# Patient Record
Sex: MALE | Race: BLACK OR AFRICAN AMERICAN | NOT HISPANIC OR LATINO | Employment: FULL TIME | URBAN - METROPOLITAN AREA
[De-identification: names, ages, dates, MRNs, and addresses within clinical notes are randomized per-mention and may not be internally consistent; named-entity substitution may affect disease eponyms.]

---

## 2018-04-02 ENCOUNTER — HOSPITAL ENCOUNTER (EMERGENCY)
Facility: HOSPITAL | Age: 43
Discharge: HOME | End: 2018-04-02
Payer: COMMERCIAL

## 2018-04-02 VITALS
SYSTOLIC BLOOD PRESSURE: 136 MMHG | HEIGHT: 69 IN | DIASTOLIC BLOOD PRESSURE: 85 MMHG | HEART RATE: 104 BPM | BODY MASS INDEX: 46.65 KG/M2 | RESPIRATION RATE: 16 BRPM | TEMPERATURE: 98 F | WEIGHT: 315 LBS
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
## 2018-04-02 DIAGNOSIS — M54.50 ACUTE RIGHT-SIDED LOW BACK PAIN WITHOUT SCIATICA: Primary | ICD-10-CM

## 2018-04-02 PROCEDURE — 99283 EMERGENCY DEPT VISIT LOW MDM: CPT

## 2018-04-02 RX ORDER — DICLOFENAC SODIUM 75 MG/1
75 TABLET, DELAYED RELEASE ORAL 2 TIMES DAILY
Qty: 14 TABLET | Refills: 0 | Status: SHIPPED | OUTPATIENT
Start: 2018-04-02 | End: 2018-04-09

## 2018-04-02 RX ORDER — ORPHENADRINE CITRATE 100 MG/1
100 TABLET, EXTENDED RELEASE ORAL 2 TIMES DAILY
Qty: 20 TABLET | Refills: 0 | Status: SHIPPED | OUTPATIENT
Start: 2018-04-02 | End: 2018-04-12
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
## 2018-11-30 PROBLEM — R63.5 ABNORMAL WEIGHT GAIN: Status: ACTIVE | Noted: 2018-11-30

## 2018-11-30 PROBLEM — R94.31 NONSPECIFIC ABNORMAL ELECTROCARDIOGRAM (ECG) (EKG): Status: ACTIVE | Noted: 2018-11-30

## 2018-11-30 PROBLEM — E34.9 TESTOSTERONE INSUFFICIENCY: Status: ACTIVE | Noted: 2018-11-30

## 2018-11-30 PROBLEM — K90.9 INTESTINAL MALABSORPTION: Status: ACTIVE | Noted: 2018-11-30

## 2018-12-13 PROBLEM — E88.819 INSULIN RESISTANCE: Status: ACTIVE | Noted: 2018-12-13

## 2018-12-13 PROBLEM — E78.2 MIXED HYPERLIPIDEMIA: Status: ACTIVE | Noted: 2018-12-13

## 2019-03-15 ENCOUNTER — OFFICE VISIT (OUTPATIENT)
Dept: INTERNAL MEDICINE | Facility: CLINIC | Age: 44
End: 2019-03-15
Payer: COMMERCIAL

## 2019-03-15 ENCOUNTER — LAB VISIT (OUTPATIENT)
Dept: LAB | Facility: HOSPITAL | Age: 44
End: 2019-03-15
Attending: FAMILY MEDICINE
Payer: COMMERCIAL

## 2019-03-15 VITALS
RESPIRATION RATE: 18 BRPM | DIASTOLIC BLOOD PRESSURE: 82 MMHG | SYSTOLIC BLOOD PRESSURE: 138 MMHG | HEIGHT: 69 IN | OXYGEN SATURATION: 99 % | HEART RATE: 109 BPM | WEIGHT: 315 LBS | BODY MASS INDEX: 46.65 KG/M2 | TEMPERATURE: 97 F

## 2019-03-15 DIAGNOSIS — R22.33 ELBOW MASS, BILATERAL: ICD-10-CM

## 2019-03-15 DIAGNOSIS — E78.00 ELEVATED CHOLESTEROL: ICD-10-CM

## 2019-03-15 DIAGNOSIS — R73.03 PREDIABETES: ICD-10-CM

## 2019-03-15 DIAGNOSIS — M72.2 PLANTAR FASCIITIS, BILATERAL: Primary | ICD-10-CM

## 2019-03-15 DIAGNOSIS — R79.89 LOW TESTOSTERONE: ICD-10-CM

## 2019-03-15 DIAGNOSIS — R09.82 POSTNASAL DRIP: ICD-10-CM

## 2019-03-15 DIAGNOSIS — Z12.5 PROSTATE CANCER SCREENING: ICD-10-CM

## 2019-03-15 DIAGNOSIS — K21.9 GASTROESOPHAGEAL REFLUX DISEASE WITHOUT ESOPHAGITIS: ICD-10-CM

## 2019-03-15 LAB
ALBUMIN SERPL BCP-MCNC: 3.4 G/DL
ALP SERPL-CCNC: 102 U/L
ALT SERPL W/O P-5'-P-CCNC: 21 U/L
ANION GAP SERPL CALC-SCNC: 9 MMOL/L
AST SERPL-CCNC: 18 U/L
BILIRUB SERPL-MCNC: 0.5 MG/DL
BUN SERPL-MCNC: 15 MG/DL
CALCIUM SERPL-MCNC: 9.8 MG/DL
CHLORIDE SERPL-SCNC: 101 MMOL/L
CHOLEST SERPL-MCNC: 224 MG/DL
CHOLEST/HDLC SERPL: 4.3 {RATIO}
CO2 SERPL-SCNC: 29 MMOL/L
COMPLEXED PSA SERPL-MCNC: 0.36 NG/ML
CREAT SERPL-MCNC: 1 MG/DL
EST. GFR  (AFRICAN AMERICAN): >60 ML/MIN/1.73 M^2
EST. GFR  (NON AFRICAN AMERICAN): >60 ML/MIN/1.73 M^2
ESTIMATED AVG GLUCOSE: 128 MG/DL
GLUCOSE SERPL-MCNC: 81 MG/DL
HBA1C MFR BLD HPLC: 6.1 %
HDLC SERPL-MCNC: 52 MG/DL
HDLC SERPL: 23.2 %
LDLC SERPL CALC-MCNC: 144.4 MG/DL
NONHDLC SERPL-MCNC: 172 MG/DL
POTASSIUM SERPL-SCNC: 4.2 MMOL/L
PROT SERPL-MCNC: 8.5 G/DL
SODIUM SERPL-SCNC: 139 MMOL/L
TRIGL SERPL-MCNC: 138 MG/DL
TSH SERPL DL<=0.005 MIU/L-ACNC: 0.72 UIU/ML

## 2019-03-15 PROCEDURE — 80053 COMPREHEN METABOLIC PANEL: CPT

## 2019-03-15 PROCEDURE — 82040 ASSAY OF SERUM ALBUMIN: CPT

## 2019-03-15 PROCEDURE — 36415 COLL VENOUS BLD VENIPUNCTURE: CPT

## 2019-03-15 PROCEDURE — 99202 PR OFFICE/OUTPT VISIT, NEW, LEVL II, 15-29 MIN: ICD-10-PCS | Mod: S$GLB,,, | Performed by: FAMILY MEDICINE

## 2019-03-15 PROCEDURE — 83036 HEMOGLOBIN GLYCOSYLATED A1C: CPT

## 2019-03-15 PROCEDURE — 80061 LIPID PANEL: CPT

## 2019-03-15 PROCEDURE — 84153 ASSAY OF PSA TOTAL: CPT

## 2019-03-15 PROCEDURE — 99999 PR PBB SHADOW E&M-EST. PATIENT-LVL III: CPT | Mod: PBBFAC,,, | Performed by: FAMILY MEDICINE

## 2019-03-15 PROCEDURE — 99202 OFFICE O/P NEW SF 15 MIN: CPT | Mod: S$GLB,,, | Performed by: FAMILY MEDICINE

## 2019-03-15 PROCEDURE — 99999 PR PBB SHADOW E&M-EST. PATIENT-LVL III: ICD-10-PCS | Mod: PBBFAC,,, | Performed by: FAMILY MEDICINE

## 2019-03-15 PROCEDURE — 84443 ASSAY THYROID STIM HORMONE: CPT

## 2019-03-15 RX ORDER — FLUTICASONE PROPIONATE 50 MCG
1 SPRAY, SUSPENSION (ML) NASAL DAILY
Qty: 1 BOTTLE | Refills: 0 | Status: SHIPPED | OUTPATIENT
Start: 2019-03-15 | End: 2019-05-01

## 2019-03-15 RX ORDER — PANTOPRAZOLE SODIUM 40 MG/1
40 TABLET, DELAYED RELEASE ORAL DAILY
Qty: 90 TABLET | Refills: 1 | Status: SHIPPED | OUTPATIENT
Start: 2019-03-15 | End: 2023-02-20

## 2019-03-15 RX ORDER — IBUPROFEN 800 MG/1
800 TABLET ORAL 3 TIMES DAILY
Qty: 60 TABLET | Refills: 1 | Status: SHIPPED | OUTPATIENT
Start: 2019-03-15 | End: 2019-05-01 | Stop reason: SDUPTHER

## 2019-03-15 NOTE — PROGRESS NOTES
Subjective:       Patient ID: Dylan Ariza is a 43 y.o. male.    Chief Complaint: Foot Pain    HPI Mr. Ariza presents today with multiple complaints.     Heel pain  Left worse than the right.   2 months   When off of them gets better but standing he feels it again.     Not taking anything.     Swelling of the elbow. Leaning on them hurts  1 month     Cough a couple years  2-3 times a week   Feels like something is in his throat  Acid reflux symptoms at times.     Review of Systems   Constitutional: Negative.    HENT: Positive for sore throat.    Respiratory: Positive for cough.    Genitourinary: Negative.    Musculoskeletal: Negative.    Neurological: Positive for headaches.   Psychiatric/Behavioral: Negative.         Past Medical History:   Diagnosis Date    Mixed hyperlipidemia 12/13/2018     Past Surgical History:   Procedure Laterality Date    SLEEVE GASTROPLASTY       Family History   Problem Relation Age of Onset    Heart disease Father     Hypertension Father     Cancer Paternal Grandmother     Diabetes Neg Hx      Social History     Socioeconomic History    Marital status: Single     Spouse name: None    Number of children: None    Years of education: None    Highest education level: None   Social Needs    Financial resource strain: None    Food insecurity - worry: None    Food insecurity - inability: None    Transportation needs - medical: None    Transportation needs - non-medical: None   Occupational History    None   Tobacco Use    Smoking status: Current Every Day Smoker    Smokeless tobacco: Never Used   Substance and Sexual Activity    Alcohol use: Yes    Drug use: No    Sexual activity: Yes   Other Topics Concern    None   Social History Narrative    None       Objective:        Physical Exam   Constitutional: He appears well-developed and well-nourished.   HENT:   Head: Normocephalic and atraumatic.   Right Ear: External ear normal.   Left Ear: External ear normal.    Pulmonary/Chest: Effort normal and breath sounds normal. No respiratory distress.   Musculoskeletal: Normal range of motion.   Both elbows have large soft mass cyst vs lipoma  nontender to palpation   Vitals reviewed.        Results for orders placed or performed in visit on 11/30/18   Hemoglobin A1c   Result Value Ref Range    A1c 5.8    Occult Blood, Fecal Immunoassay   Result Value Ref Range    Fecal Occult Bld Neg    Urine Dip with micro, POC   Result Value Ref Range    Color, UA      Spec Grav UA 1.025     pH, UA 6.0     WBC, UA neg     Blood, UA neg     Nitrite, UA neg     Ketones, UA neg     Bilirubin neg     Urobilinogen, UA 0.2     Protein neg     Glucose, UA neg     Casts None Seen     Bacteria, UA None Seen None Seen, Occasional, 1-5    Crystals None Seen     White Blood Cells None Seen     RBC Cells Counted None Seen        Assessment/Plan:   Plantar fasciitis, bilateral  -     ibuprofen (ADVIL,MOTRIN) 800 MG tablet; Take 1 tablet (800 mg total) by mouth 3 (three) times daily.  Dispense: 60 tablet; Refill: 1  Gave handout with stretches and initial modes of therapy.   Will refer to podiatry if needed    Gastroesophageal reflux disease without esophagitis  -     pantoprazole (PROTONIX) 40 MG tablet; Take 1 tablet (40 mg total) by mouth once daily.  Dispense: 90 tablet; Refill: 1    Elevated cholesterol  -     Lipid panel; Future; Expected date: 03/15/2019  -     Comprehensive metabolic panel; Future; Expected date: 03/15/2019    Prediabetes  -     Hemoglobin A1c; Future; Expected date: 03/15/2019  -     TSH; Future; Expected date: 03/15/2019    Prostate cancer screening  -     PSA, Screening; Future; Expected date: 03/15/2019    Low testosterone  -     TESTOSTERONE PANEL; Future; Expected date: 03/15/2019    Postnasal drip  -     fluticasone (FLONASE) 50 mcg/actuation nasal spray; 1 spray (50 mcg total) by Each Nare route once daily.  Dispense: 1 Bottle; Refill: 0    Elbow mass, bilateral  -     US Soft  Tissue Misc; Future; Expected date: 03/15/2019  Discussed thought that he may also have tennis elbow  Will evaluate to see if what is noted on exam is a cyst vs lipoma.   Discussed treatment options and referral to surgery.       No Follow-up on file.    Crista Rinaldi MD  ON   Family Medicine

## 2019-03-15 NOTE — Clinical Note
Please schedule ultrasound for patient Touch base with patient inform him that I apologize if he feels appointment was rushed b/c I was trying to get him to the lab. Ask if he was able to get medication for reflux and that we can follow up after he get ultrasound

## 2019-03-18 ENCOUNTER — TELEPHONE (OUTPATIENT)
Dept: INTERNAL MEDICINE | Facility: CLINIC | Age: 44
End: 2019-03-18

## 2019-03-18 NOTE — TELEPHONE ENCOUNTER
----- Message from Crista Rinaldi MD sent at 3/16/2019  1:46 PM CDT -----  Please schedule ultrasound for patient   Touch base with patient inform him that I apologize if he feels appointment was rushed b/c I was trying to get him to the lab.   Ask if he was able to get medication for reflux and that we can follow up after he get ultrasound

## 2019-03-19 ENCOUNTER — PATIENT MESSAGE (OUTPATIENT)
Dept: INTERNAL MEDICINE | Facility: CLINIC | Age: 44
End: 2019-03-19

## 2019-03-19 NOTE — TELEPHONE ENCOUNTER
----- Message from Crista Rinaldi MD sent at 3/18/2019  9:16 AM CDT -----  Pt has seen results  He has a history of insulin resistance and A1c is in the range for prediabetes. I know he is dealing with heel pain so exercise would be difficulty so diet is important. Decrease sweets this means watching fruit intake as well.   We may need to start Metformin. Has patient been on this before? Not sure if he had any problems taking metformin.   Cholesterol total was elevated again diet changes are important.   Follow up with me in 2-3 weeks if able

## 2019-03-19 NOTE — TELEPHONE ENCOUNTER
----- Message from Eliane Zarate sent at 3/19/2019 11:34 AM CDT -----  Contact: Pt   Type:  Patient Returning Call    Who Called:Pt   Who Left Message for Patient: Vianca   Does the patient know what this is regarding?: visit f/u   Would the patient rather a call back or a response via [facility]? Call   Best Call Back Number:[phone] (home)   Additional Information:

## 2019-03-20 ENCOUNTER — TELEPHONE (OUTPATIENT)
Dept: INTERNAL MEDICINE | Facility: CLINIC | Age: 44
End: 2019-03-20

## 2019-03-22 ENCOUNTER — HOSPITAL ENCOUNTER (OUTPATIENT)
Dept: RADIOLOGY | Facility: HOSPITAL | Age: 44
Discharge: HOME | End: 2019-03-22
Attending: FAMILY MEDICINE
Payer: COMMERCIAL

## 2019-03-22 ENCOUNTER — PATIENT MESSAGE (OUTPATIENT)
Dept: INTERNAL MEDICINE | Facility: CLINIC | Age: 44
End: 2019-03-22

## 2019-03-22 DIAGNOSIS — R22.33 ELBOW MASS, BILATERAL: ICD-10-CM

## 2019-03-22 DIAGNOSIS — R22.33 ELBOW MASS, BILATERAL: Primary | ICD-10-CM

## 2019-03-22 LAB
ALBUMIN SERPL-MCNC: 3.8 G/DL (ref 3.6–5.1)
SHBG SERPL-SCNC: 13 NMOL/L (ref 10–50)
TESTOST FREE SERPL-MCNC: 15.2 PG/ML (ref 46–224)
TESTOST SERPL-MCNC: 64 NG/DL (ref 250–1100)
TESTOSTERONE.FREE+WB SERPL-MCNC: 26.6 NG/DL (ref 110–575)

## 2019-03-22 PROCEDURE — 76882 US LMTD JT/FCL EVL NVASC XTR: CPT | Mod: TC

## 2019-03-22 PROCEDURE — 76882 US LMTD JT/FCL EVL NVASC XTR: CPT | Mod: 26,,, | Performed by: RADIOLOGY

## 2019-03-22 PROCEDURE — 76882 US EXTREMITY NON VASCULAR LIMITED BILAT: ICD-10-PCS | Mod: 26,,, | Performed by: RADIOLOGY

## 2019-03-27 ENCOUNTER — PATIENT OUTREACH (OUTPATIENT)
Dept: ADMINISTRATIVE | Facility: HOSPITAL | Age: 44
End: 2019-03-27

## 2019-03-27 NOTE — LETTER
March 27, 2019        Mason Orozco MD  [address] [place] [address] [place] [id number]             [facility] [address] [place] [address]  Phone: [phone]   Patient: Dylan Ariza   MR Number: [id number]   YOB: 1975   Date of Visit: 3/27/2019       Dear Dr. Orozco:         We are seeing Dylan ArizaCARLOZ.O.B is 1975, at [facility]. Tamar Rinaldi MD is their primary care physician. To help with our health maintenance records could you please send the following:     Most recent Colonoscopy Report  Please send fax to [phone].    Thank-you in advance for your assistance. If you have any questions or concerns, please don't hesitate to contact me at [phone].     Sincerely,  Keysha ARCHER LPN Care Coordination   [facility]   Phone [phone] ext [phone],  Fax [phone]  [phone]

## 2019-03-29 ENCOUNTER — PATIENT MESSAGE (OUTPATIENT)
Dept: INTERNAL MEDICINE | Facility: CLINIC | Age: 44
End: 2019-03-29

## 2019-04-01 ENCOUNTER — HOSPITAL ENCOUNTER (OUTPATIENT)
Dept: RADIOLOGY | Facility: HOSPITAL | Age: 44
Discharge: HOME | End: 2019-04-01
Attending: FAMILY MEDICINE
Payer: COMMERCIAL

## 2019-04-01 DIAGNOSIS — R22.33 ELBOW MASS, BILATERAL: ICD-10-CM

## 2019-04-01 PROCEDURE — 73080 X-RAY EXAM OF ELBOW: CPT | Mod: 50,TC,FY

## 2019-04-01 PROCEDURE — 73080 X-RAY EXAM OF ELBOW: CPT | Mod: 26,50,, | Performed by: RADIOLOGY

## 2019-04-01 PROCEDURE — 73080 XR ELBOW COMPLETE 3 VIEW BILATERAL: ICD-10-PCS | Mod: 26,50,, | Performed by: RADIOLOGY

## 2019-04-10 ENCOUNTER — OFFICE VISIT (OUTPATIENT)
Dept: INTERNAL MEDICINE | Facility: CLINIC | Age: 44
End: 2019-04-10
Payer: COMMERCIAL

## 2019-04-10 VITALS
HEIGHT: 69 IN | BODY MASS INDEX: 46.65 KG/M2 | HEART RATE: 101 BPM | DIASTOLIC BLOOD PRESSURE: 86 MMHG | RESPIRATION RATE: 18 BRPM | TEMPERATURE: 97 F | OXYGEN SATURATION: 97 % | SYSTOLIC BLOOD PRESSURE: 124 MMHG | WEIGHT: 315 LBS

## 2019-04-10 DIAGNOSIS — M70.21 OLECRANON BURSITIS OF BOTH ELBOWS: ICD-10-CM

## 2019-04-10 DIAGNOSIS — R73.03 PREDIABETES: ICD-10-CM

## 2019-04-10 DIAGNOSIS — F51.01 PRIMARY INSOMNIA: ICD-10-CM

## 2019-04-10 DIAGNOSIS — M70.22 OLECRANON BURSITIS OF BOTH ELBOWS: ICD-10-CM

## 2019-04-10 DIAGNOSIS — E88.09 HYPOALBUMINEMIA: ICD-10-CM

## 2019-04-10 DIAGNOSIS — R79.89 LOW TESTOSTERONE IN MALE: Primary | ICD-10-CM

## 2019-04-10 PROCEDURE — 99214 PR OFFICE/OUTPT VISIT, EST, LEVL IV, 30-39 MIN: ICD-10-PCS | Mod: 25,S$GLB,, | Performed by: FAMILY MEDICINE

## 2019-04-10 PROCEDURE — 99214 OFFICE O/P EST MOD 30 MIN: CPT | Mod: 25,S$GLB,, | Performed by: FAMILY MEDICINE

## 2019-04-10 PROCEDURE — 96372 THER/PROPH/DIAG INJ SC/IM: CPT | Mod: S$GLB,,, | Performed by: FAMILY MEDICINE

## 2019-04-10 PROCEDURE — 99999 PR PBB SHADOW E&M-EST. PATIENT-LVL III: ICD-10-PCS | Mod: PBBFAC,,, | Performed by: FAMILY MEDICINE

## 2019-04-10 PROCEDURE — 99999 PR PBB SHADOW E&M-EST. PATIENT-LVL III: CPT | Mod: PBBFAC,,, | Performed by: FAMILY MEDICINE

## 2019-04-10 PROCEDURE — 96372 PR INJECTION,THERAP/PROPH/DIAG2ST, IM OR SUBCUT: ICD-10-PCS | Mod: S$GLB,,, | Performed by: FAMILY MEDICINE

## 2019-04-10 RX ORDER — ZOLPIDEM TARTRATE 10 MG/1
10 TABLET ORAL NIGHTLY PRN
Qty: 30 TABLET | Refills: 0 | Status: SHIPPED | OUTPATIENT
Start: 2019-04-10 | End: 2019-04-30 | Stop reason: SDUPTHER

## 2019-04-10 RX ORDER — TESTOSTERONE CYPIONATE 200 MG/ML
200 INJECTION, SOLUTION INTRAMUSCULAR
Status: DISCONTINUED | OUTPATIENT
Start: 2019-04-10 | End: 2019-05-01

## 2019-04-10 RX ADMIN — TESTOSTERONE CYPIONATE 200 MG: 200 INJECTION, SOLUTION INTRAMUSCULAR at 04:04

## 2019-04-10 NOTE — PROGRESS NOTES
Subjective:       Patient ID: Dylan Ariza is a 44 y.o. male.    Chief Complaint: Follow-up    HPI Mr. Ariza presents today to follow up.   He would like to review Ultrasound and Xray of elbows-thought to have olcranean bursitis bilaterally.     Low testosterone  Was getting testosterone injections.   He was put on viagra by another physician.   ED   No mood changes or fatigue    Elevated cholesterol-wife and he have recently started walking for exercise and eating more healthy.     Problems going to sleep  Use to take Ambien would like to start back.     Review of Systems   Constitutional: Negative for activity change and unexpected weight change.   HENT: Negative for hearing loss, rhinorrhea and trouble swallowing.    Eyes: Negative for discharge and visual disturbance.   Respiratory: Negative for chest tightness and wheezing.    Cardiovascular: Negative for chest pain and palpitations.   Gastrointestinal: Negative for blood in stool, constipation, diarrhea and vomiting.   Endocrine: Negative for polydipsia and polyuria.   Genitourinary: Negative for difficulty urinating, hematuria and urgency.   Musculoskeletal: Negative for arthralgias, joint swelling and neck pain.   Neurological: Negative for weakness and headaches.   Psychiatric/Behavioral: Negative for confusion and dysphoric mood.           Objective:        Physical Exam   Constitutional: He is oriented to person, place, and time. He appears well-developed and well-nourished.   obese   HENT:   Head: Normocephalic and atraumatic.   Musculoskeletal: Normal range of motion.   Fluctuant mass of both elbows   Neurological: He is alert and oriented to person, place, and time.   Skin: Skin is warm.   Vitals reviewed.        Results for orders placed or performed in visit on 03/15/19   Lipid panel   Result Value Ref Range    Cholesterol 224 (H) 120 - 199 mg/dL    Triglycerides 138 30 - 150 mg/dL    HDL 52 40 - 75 mg/dL    LDL Cholesterol 144.4 63.0 -  159.0 mg/dL    HDL/Chol Ratio 23.2 20.0 - 50.0 %    Total Cholesterol/HDL Ratio 4.3 2.0 - 5.0    Non-HDL Cholesterol 172 mg/dL   Comprehensive metabolic panel   Result Value Ref Range    Sodium 139 136 - 145 mmol/L    Potassium 4.2 3.5 - 5.1 mmol/L    Chloride 101 95 - 110 mmol/L    CO2 29 23 - 29 mmol/L    Glucose 81 70 - 110 mg/dL    BUN, Bld 15 6 - 20 mg/dL    Creatinine 1.0 0.5 - 1.4 mg/dL    Calcium 9.8 8.7 - 10.5 mg/dL    Total Protein 8.5 (H) 6.0 - 8.4 g/dL    Albumin 3.4 (L) 3.5 - 5.2 g/dL    Total Bilirubin 0.5 0.1 - 1.0 mg/dL    Alkaline Phosphatase 102 55 - 135 U/L    AST 18 10 - 40 U/L    ALT 21 10 - 44 U/L    Anion Gap 9 8 - 16 mmol/L    eGFR if African American >60.0 >60 mL/min/1.73 m^2    eGFR if non African American >60.0 >60 mL/min/1.73 m^2   Hemoglobin A1c   Result Value Ref Range    Hemoglobin A1C 6.1 (H) 4.0 - 5.6 %    Estimated Avg Glucose 128 68 - 131 mg/dL   PSA, Screening   Result Value Ref Range    PSA, SCREEN 0.36 0.00 - 4.00 ng/mL   TESTOSTERONE PANEL   Result Value Ref Range    Testosterone 64 (L) 250 - 1100 ng/dL    Testosterone, Free 15.2 (L) 46.0 - 224.0 pg/mL    Testosterone, Bioavailable 26.6 (L) 110.0 - 575.0 ng/dL    Sex Hormone Binding Globulin 13 10 - 50 nmol/L    Albumin 3.8 3.6 - 5.1 g/dL   TSH   Result Value Ref Range    TSH 0.725 0.400 - 4.000 uIU/mL       Assessment/Plan:     Low testosterone in male  -     testosterone cypionate injection 200 mg  -     CBC auto differential; Future; Expected date: 04/10/2019  -     TESTOSTERONE PANEL; Future; Expected date: 04/10/2019    Prediabetes  -     Hemoglobin A1c; Future; Expected date: 04/10/2019  Continue new diet and exercise.   Elected not to start metformin at this time.     Hypoalbuminemia  -     Comprehensive metabolic panel; Future; Expected date: 04/10/2019    Primary insomnia  -     zolpidem (AMBIEN) 10 mg Tab; Take 1 tablet (10 mg total) by mouth nightly as needed.  Dispense: 30 tablet; Refill: 0    Olecranon bursitis of  both elbows  Ibuprofen twice a day for 1 week then back as needed. Compression sleeve for 2-4 weeks. Patient follows up in 3 weeks will decide if we need to have him evaluated for aspiration.       Follow up in about 3 weeks (around 5/1/2019), or if symptoms worsen or fail to improve.    Crista Rinaldi MD  [facility]

## 2019-04-30 DIAGNOSIS — F51.01 PRIMARY INSOMNIA: ICD-10-CM

## 2019-05-01 ENCOUNTER — OFFICE VISIT (OUTPATIENT)
Dept: INTERNAL MEDICINE | Facility: CLINIC | Age: 44
End: 2019-05-01
Payer: COMMERCIAL

## 2019-05-01 VITALS
DIASTOLIC BLOOD PRESSURE: 78 MMHG | OXYGEN SATURATION: 97 % | HEIGHT: 69 IN | BODY MASS INDEX: 46.65 KG/M2 | TEMPERATURE: 97 F | RESPIRATION RATE: 18 BRPM | SYSTOLIC BLOOD PRESSURE: 142 MMHG | HEART RATE: 113 BPM | WEIGHT: 315 LBS

## 2019-05-01 DIAGNOSIS — M70.22 OLECRANON BURSITIS OF BOTH ELBOWS: ICD-10-CM

## 2019-05-01 DIAGNOSIS — R03.0 ELEVATED BLOOD PRESSURE READING: ICD-10-CM

## 2019-05-01 DIAGNOSIS — M70.21 OLECRANON BURSITIS OF BOTH ELBOWS: ICD-10-CM

## 2019-05-01 DIAGNOSIS — M72.2 PLANTAR FASCIITIS, BILATERAL: ICD-10-CM

## 2019-05-01 DIAGNOSIS — E78.00 ELEVATED CHOLESTEROL: ICD-10-CM

## 2019-05-01 DIAGNOSIS — R79.89 LOW TESTOSTERONE IN MALE: Primary | ICD-10-CM

## 2019-05-01 DIAGNOSIS — R73.03 PREDIABETES: ICD-10-CM

## 2019-05-01 PROCEDURE — 96372 PR INJECTION,THERAP/PROPH/DIAG2ST, IM OR SUBCUT: ICD-10-PCS | Mod: S$GLB,,, | Performed by: FAMILY MEDICINE

## 2019-05-01 PROCEDURE — 96372 THER/PROPH/DIAG INJ SC/IM: CPT | Mod: S$GLB,,, | Performed by: FAMILY MEDICINE

## 2019-05-01 PROCEDURE — 99214 PR OFFICE/OUTPT VISIT, EST, LEVL IV, 30-39 MIN: ICD-10-PCS | Mod: 25,S$GLB,, | Performed by: FAMILY MEDICINE

## 2019-05-01 PROCEDURE — 99999 PR PBB SHADOW E&M-EST. PATIENT-LVL III: CPT | Mod: PBBFAC,,, | Performed by: FAMILY MEDICINE

## 2019-05-01 PROCEDURE — 99214 OFFICE O/P EST MOD 30 MIN: CPT | Mod: 25,S$GLB,, | Performed by: FAMILY MEDICINE

## 2019-05-01 PROCEDURE — 99999 PR PBB SHADOW E&M-EST. PATIENT-LVL III: ICD-10-PCS | Mod: PBBFAC,,, | Performed by: FAMILY MEDICINE

## 2019-05-01 RX ORDER — IBUPROFEN 800 MG/1
800 TABLET ORAL 3 TIMES DAILY
Qty: 60 TABLET | Refills: 1 | Status: SHIPPED | OUTPATIENT
Start: 2019-05-01 | End: 2020-03-13 | Stop reason: SDUPTHER

## 2019-05-01 RX ORDER — ZOLPIDEM TARTRATE 10 MG/1
TABLET ORAL
Qty: 30 TABLET | Refills: 0 | Status: SHIPPED | OUTPATIENT
Start: 2019-05-01 | End: 2019-06-10 | Stop reason: SDUPTHER

## 2019-05-01 RX ORDER — TESTOSTERONE CYPIONATE 200 MG/ML
200 INJECTION, SOLUTION INTRAMUSCULAR
Status: ACTIVE | OUTPATIENT
Start: 2019-05-01 | End: 2019-08-21

## 2019-05-01 RX ADMIN — TESTOSTERONE CYPIONATE 200 MG: 200 INJECTION, SOLUTION INTRAMUSCULAR at 04:05

## 2019-05-01 NOTE — PROGRESS NOTES
Subjective:       Patient ID: Dylan Ariza is a 44 y.o. male.    Chief Complaint: Follow-up    HPI Mr. Ariza presents for Follow up  Did not feel any different in regard to energy after the testosterone injection. HIs testosterone on last check was 64.      Migraine all week last week     The bilateral olecranon bursitis did not improve with ibuprofen and compression the compression made it hurt worse. He would like to move forward with aspiration.     He is prediabetic with and a1c of 6.1   Steroids may be considered after aspiration will need to keep blood glucose as low as possible.    Review of Systems   Constitutional: Negative.    HENT: Negative.    Respiratory: Negative.    Musculoskeletal: Positive for arthralgias and joint swelling.   Neurological: Positive for headaches.   Psychiatric/Behavioral: Negative.            Past Medical History:   Diagnosis Date    Mixed hyperlipidemia 12/13/2018     Past Surgical History:   Procedure Laterality Date    SLEEVE GASTROPLASTY       Objective:        Physical Exam   Constitutional: He is oriented to person, place, and time. He appears well-developed and well-nourished.   HENT:   Head: Normocephalic and atraumatic.   Right Ear: External ear normal.   Left Ear: External ear normal.   Eyes: EOM are normal.   Cardiovascular: Normal heart sounds.   Pulmonary/Chest: Breath sounds normal.   Musculoskeletal: He exhibits edema and tenderness.   Bilateral swelling of elbows (olecranon bursitis)   Neurological: He is alert and oriented to person, place, and time.   Vitals reviewed.        Results for orders placed or performed in visit on 03/15/19   Lipid panel   Result Value Ref Range    Cholesterol 224 (H) 120 - 199 mg/dL    Triglycerides 138 30 - 150 mg/dL    HDL 52 40 - 75 mg/dL    LDL Cholesterol 144.4 63.0 - 159.0 mg/dL    Hdl/Cholesterol Ratio 23.2 20.0 - 50.0 %    Total Cholesterol/HDL Ratio 4.3 2.0 - 5.0    Non-HDL Cholesterol 172 mg/dL   Comprehensive  metabolic panel   Result Value Ref Range    Sodium 139 136 - 145 mmol/L    Potassium 4.2 3.5 - 5.1 mmol/L    Chloride 101 95 - 110 mmol/L    CO2 29 23 - 29 mmol/L    Glucose 81 70 - 110 mg/dL    BUN, Bld 15 6 - 20 mg/dL    Creatinine 1.0 0.5 - 1.4 mg/dL    Calcium 9.8 8.7 - 10.5 mg/dL    Total Protein 8.5 (H) 6.0 - 8.4 g/dL    Albumin 3.4 (L) 3.5 - 5.2 g/dL    Total Bilirubin 0.5 0.1 - 1.0 mg/dL    Alkaline Phosphatase 102 55 - 135 U/L    AST 18 10 - 40 U/L    ALT 21 10 - 44 U/L    Anion Gap 9 8 - 16 mmol/L    eGFR if African American >60.0 >60 mL/min/1.73 m^2    eGFR if non African American >60.0 >60 mL/min/1.73 m^2   Hemoglobin A1c   Result Value Ref Range    Hemoglobin A1C 6.1 (H) 4.0 - 5.6 %    Estimated Avg Glucose 128 68 - 131 mg/dL   PSA, Screening   Result Value Ref Range    PSA, SCREEN 0.36 0.00 - 4.00 ng/mL   TESTOSTERONE PANEL   Result Value Ref Range    Testosterone 64 (L) 250 - 1100 ng/dL    Testosterone, Free 15.2 (L) 46.0 - 224.0 pg/mL    Testosterone, Bioavailable 26.6 (L) 110.0 - 575.0 ng/dL    Sex Hormone Binding Globulin 13 10 - 50 nmol/L    Albumin 3.8 3.6 - 5.1 g/dL   TSH   Result Value Ref Range    TSH 0.725 0.400 - 4.000 uIU/mL       Assessment/Plan:     Low testosterone in male  -     testosterrone cypionate injection 200 mg  Will give injection 2 weeks a part and repeat testosterone.   I wanted to give injection weekly for 3-4 weeks but patient could not do this with his work schedule    Elevated cholesterol  -     Lipid panel; Future; Expected date: 05/01/2019    Plantar fasciitis, bilateral  -     ibuprofen (ADVIL,MOTRIN) 800 MG tablet; Take 1 tablet (800 mg total) by mouth 3 (three) times daily.  Dispense: 60 tablet; Refill: 1    Olecranon bursitis of both elbows  -     Ambulatory Referral to Orthopedics  Discussed with Dr. Rubi     Prediabetes  Continue diet changes and exercise  Continue working on weight loss.   Dr. Rubi usually gives steroid and this will cause sugar to go  up. Would like to continue working on getting BG as low as possible    Elevated Blood pressure reading  Exercise weight loss  Diet changes    Follow up in about 1 month (around 5/29/2019), or if symptoms worsen or fail to improve.    Crista Rinaldi MD  ON   Family Medicine

## 2019-05-12 ENCOUNTER — HOSPITAL ENCOUNTER (EMERGENCY)
Facility: HOSPITAL | Age: 44
Discharge: HOME | End: 2019-05-12
Attending: EMERGENCY MEDICINE
Payer: COMMERCIAL

## 2019-05-12 VITALS
WEIGHT: 315 LBS | OXYGEN SATURATION: 95 % | RESPIRATION RATE: 20 BRPM | TEMPERATURE: 98 F | DIASTOLIC BLOOD PRESSURE: 92 MMHG | BODY MASS INDEX: 46.65 KG/M2 | SYSTOLIC BLOOD PRESSURE: 154 MMHG | HEART RATE: 110 BPM | HEIGHT: 69 IN

## 2019-05-12 DIAGNOSIS — R51.9 ACUTE NONINTRACTABLE HEADACHE, UNSPECIFIED HEADACHE TYPE: Primary | ICD-10-CM

## 2019-05-12 PROCEDURE — 96372 THER/PROPH/DIAG INJ SC/IM: CPT

## 2019-05-12 PROCEDURE — 99284 EMERGENCY DEPT VISIT MOD MDM: CPT | Mod: 25

## 2019-05-12 PROCEDURE — 63600175 PHARM REV CODE 636 W HCPCS: Performed by: EMERGENCY MEDICINE

## 2019-05-12 RX ORDER — KETOROLAC TROMETHAMINE 30 MG/ML
30 INJECTION, SOLUTION INTRAMUSCULAR; INTRAVENOUS
Status: COMPLETED | OUTPATIENT
Start: 2019-05-12 | End: 2019-05-12

## 2019-05-12 RX ORDER — BUTALBITAL, ACETAMINOPHEN AND CAFFEINE 50; 325; 40 MG/1; MG/1; MG/1
1 TABLET ORAL EVERY 6 HOURS PRN
Qty: 20 TABLET | Refills: 0 | Status: SHIPPED | OUTPATIENT
Start: 2019-05-12 | End: 2019-06-11

## 2019-05-12 RX ADMIN — KETOROLAC TROMETHAMINE 30 MG: 30 INJECTION, SOLUTION INTRAMUSCULAR; INTRAVENOUS at 09:05

## 2019-05-13 NOTE — ED PROVIDER NOTES
SCRIBE #1 NOTE: I, Helenaronda Clements, am scribing for, and in the presence of, Mu Roa MD. I have scribed the entire note.      History      Chief Complaint   Patient presents with    Headache     started 2 weeks ago.        Review of patient's allergies indicates:  No Known Allergies     HPI   HPI    5/12/2019, 9:37 PM   History obtained from the patient      History of Present Illness: Dylan Ariza is a 44 y.o. male patient who presents to the Emergency Department for HA's which onset gradually 2 weeks ago. Pt reports he does not have a PMHx of HA's and has been having a HA every day for the past 2 weeks. He reports temporal HA's and state they switch from L to R. He describes the HA's as pounding. Sxs are intermittent and severe. He reports he may be HA free for 2-3 hours each day. No modifying factors. No associated sxs. Pt denies any falls, head trauma, fever, chills, n/v, extremity weakness/numbness, dizziness, neck pain/stiffness, rash, photophobia, visual disturbances, and all other sxs. Pt has taken multiple OTC pain medications with no relief of pain. No further complaints or concerns.       Arrival mode: Personal vehicle      PCP: Crista Rinaldi MD       Past Medical History:  Past Medical History:   Diagnosis Date    Mixed hyperlipidemia 12/13/2018       Past Surgical History:  Past Surgical History:   Procedure Laterality Date    SLEEVE GASTROPLASTY           Family History:  Family History   Problem Relation Age of Onset    Heart disease Father     Hypertension Father     Cancer Paternal Grandmother     Diabetes Neg Hx        Social History:  Social History     Tobacco Use    Smoking status: Current Every Day Smoker    Smokeless tobacco: Never Used   Substance and Sexual Activity    Alcohol use: Yes    Drug use: No    Sexual activity: Yes       ROS   Review of Systems   Constitutional: Negative for chills and fever.   HENT: Negative for sore throat.    Eyes: Negative for  photophobia and visual disturbance.   Respiratory: Negative for shortness of breath.    Cardiovascular: Negative for chest pain.   Gastrointestinal: Negative for nausea and vomiting.   Genitourinary: Negative for dysuria.   Musculoskeletal: Negative for back pain, neck pain and neck stiffness.   Skin: Negative for rash.   Neurological: Positive for headaches. Negative for dizziness, weakness and numbness.   Hematological: Negative for adenopathy.   All other systems reviewed and are negative.    Physical Exam      Initial Vitals [05/12/19 2127]   BP Pulse Resp Temp SpO2   (!) 154/92 110 20 98.1 °F (36.7 °C) 95 %      MAP       --          Physical Exam  Nursing Notes and Vital Signs Reviewed.  Constitutional: Patient is in no acute distress. Well-developed and well-nourished. Morbidly obese.   Head: Atraumatic. Normocephalic.  Eyes: PERRL. EOM intact. Conjunctivae are not pale. No scleral icterus.  ENT: Mucous membranes are moist. Oropharynx is clear and symmetric.    Neck: Supple. Full ROM. No lymphadenopathy. No meningismus.   Cardiovascular: Tachycardic. Regular rhythm. No murmurs, rubs, or gallops. Distal pulses are 2+ and symmetric.  Pulmonary/Chest: No respiratory distress. Clear to auscultation bilaterally. No wheezing or rales.  Abdominal: Soft and non-distended.  There is no tenderness.  No rebound, guarding, or rigidity.   Musculoskeletal: Moves all extremities. No obvious deformities. No edema. No calf tenderness.  Skin: Warm and dry.  Neurological:  Alert, awake, and appropriate. CNII-XII intact. GCS 15. Equal  strength. Normal gait. DTR's 2+ and equal. Normal speech.  No acute focal neurological deficits are appreciated.  Psychiatric: Normal affect. Good eye contact. Appropriate in content.    ED Course    Procedures  ED Vital Signs:  Vitals:    05/12/19 2127   BP: (!) 154/92   Pulse: 110   Resp: 20   Temp: 98.1 °F (36.7 °C)   TempSrc: Oral   SpO2: 95%   Weight: (!) 167.8 kg (369 lb 14.9 oz)  "  Height: 5' 9" (1.753 m)       Imaging Results:  Imaging Results          CT Head Without Contrast (Final result)  Result time 05/12/19 22:21:28    Final result by Kevin Weathers MD (05/12/19 22:21:28)                 Impression:      Negative head CT.    All CT scans at this facility are performed  using dose modulation techniques as appropriate to performed exam including the following:  automated exposure control; adjustment of mA and/or kV according to the patients size (this includes techniques or standardized protocols for targeted exams where dose is matched to indication/reason for exam: i.e. extremities or head);  iterative reconstruction technique.      Electronically signed by: Kevin Weathers MD  Date:    05/12/2019  Time:    22:21             Narrative:    EXAMINATION:  CT HEAD WITHOUT CONTRAST    CLINICAL HISTORY:  Headache, acute, norm neuro exam;    TECHNIQUE:  Routine noncontrast head CT.    COMPARISON:  None    FINDINGS:  There is no acute intracranial hemorrhage or abnormal extra-axial fluid collection.  There is no abnormal increased or decreased density within the brain parenchyma.  Gray-white differentiation preserved.  Normal ventricles.  There is no intracranial mass or mass effect.  The calvarium is intact.  Visualized paranasal sinuses and mastoids are well aerated.  Remote bilateral medial orbital wall fracture deformities.                                        The Emergency Provider reviewed the vital signs and test results, which are outlined above.    ED Discussion     10:37 PM: Reassessed pt at this time.  Pt states his condition has improved at this time after Toradol. Discussed with pt all pertinent ED information and results. Discussed pt dx and plan of tx. Gave pt all f/u and return to the ED instructions. All questions and concerns were addressed at this time. Pt expresses understanding of information and instructions, and is comfortable with plan to discharge. Pt is stable for " discharge.    Patient's headache is either consistent with previous headache and/or lacks features concerning for emergent or life threatening condition.  I do not suspect SAH, meningitis, increased IC pressure, infectious, toxic, vascular, CNS, or other EMC.  I have discussed this at length with patient and/or family/caretaker.    Pre-hypertension/Hypertension: The pt has been informed that they may have pre-hypertension or hypertension based on a blood pressure reading in the ED. I recommend that the pt call the PCP listed on their discharge instructions or a physician of their choice this week to arrange f/u for further evaluation of possible pre-hypertension or hypertension.       ED Medication(s):  Medications   ketorolac injection 30 mg (30 mg Intramuscular Given 5/12/19 2159)     Discharge Medication List as of 5/12/2019 10:39 PM      START taking these medications    Details   butalbital-acetaminophen-caffeine -40 mg (FIORICET, ESGIC) -40 mg per tablet Take 1 tablet by mouth every 6 (six) hours as needed for Headaches., Starting Sun 5/12/2019, Until Tue 6/11/2019, Print           Follow-up Information     Crista Rinaldi MD In 2 days.    Specialty:  Internal Medicine  Contact information:  [address] [place] [address] [phone]             [facility]    Specialty:  Emergency Medicine  Why:  As needed, If symptoms worsen  Contact information:  [address] [facility] [address] [phone]                   Medical Decision Making    Medical Decision Making:   Clinical Tests:   Radiological Study: Ordered and Reviewed           Scribe Attestation:   Scribe #1: I performed the above scribed service and the documentation accurately describes the services I performed. I attest to the accuracy of the note.    Attending:   Physician Attestation Statement for Scribe #1: I, Mu Roa MD, personally performed the services described in  this documentation, as scribed by Helena Clements, in my presence, and it is both accurate and complete.          Clinical Impression       ICD-10-CM ICD-9-CM   1. Acute nonintractable headache, unspecified headache type R51 784.0       Disposition:   Disposition: Discharged  Condition: Stable         Mu Roa MD  05/14/19 [id number]

## 2019-05-15 ENCOUNTER — CLINICAL SUPPORT (OUTPATIENT)
Dept: INTERNAL MEDICINE | Facility: CLINIC | Age: 44
End: 2019-05-15
Payer: COMMERCIAL

## 2019-05-15 ENCOUNTER — LAB VISIT (OUTPATIENT)
Dept: LAB | Facility: HOSPITAL | Age: 44
End: 2019-05-15
Attending: FAMILY MEDICINE
Payer: COMMERCIAL

## 2019-05-15 DIAGNOSIS — R79.89 LOW TESTOSTERONE: Primary | ICD-10-CM

## 2019-05-15 DIAGNOSIS — R79.89 LOW TESTOSTERONE: ICD-10-CM

## 2019-05-15 PROCEDURE — 99999 PR PBB SHADOW E&M-EST. PATIENT-LVL I: CPT | Mod: PBBFAC,,,

## 2019-05-15 PROCEDURE — 96372 PR INJECTION,THERAP/PROPH/DIAG2ST, IM OR SUBCUT: ICD-10-PCS | Mod: S$GLB,,, | Performed by: FAMILY MEDICINE

## 2019-05-15 PROCEDURE — 96372 THER/PROPH/DIAG INJ SC/IM: CPT | Mod: S$GLB,,, | Performed by: FAMILY MEDICINE

## 2019-05-15 PROCEDURE — 82040 ASSAY OF SERUM ALBUMIN: CPT

## 2019-05-15 PROCEDURE — 36415 COLL VENOUS BLD VENIPUNCTURE: CPT

## 2019-05-15 PROCEDURE — 99999 PR PBB SHADOW E&M-EST. PATIENT-LVL I: ICD-10-PCS | Mod: PBBFAC,,,

## 2019-05-15 RX ADMIN — TESTOSTERONE CYPIONATE 200 MG: 200 INJECTION, SOLUTION INTRAMUSCULAR at 03:05

## 2019-05-20 LAB
ALBUMIN SERPL-MCNC: 3.7 G/DL (ref 3.6–5.1)
SHBG SERPL-SCNC: 13 NMOL/L (ref 10–50)
TESTOST FREE SERPL-MCNC: 104.5 PG/ML (ref 46–224)
TESTOST SERPL-MCNC: 378 NG/DL (ref 250–1100)
TESTOSTERONE.FREE+WB SERPL-MCNC: 178.5 NG/DL (ref 110–575)

## 2019-05-21 ENCOUNTER — TELEPHONE (OUTPATIENT)
Dept: INTERNAL MEDICINE | Facility: CLINIC | Age: 44
End: 2019-05-21

## 2019-05-21 NOTE — TELEPHONE ENCOUNTER
Pt states he feels much better since starting the injections as far as energy is concerned.  States he has started having headaches since he started the injections though

## 2019-05-21 NOTE — TELEPHONE ENCOUNTER
----- Message from Crista Rinaldi MD sent at 5/21/2019  3:51 PM CDT -----  Testosterone is in normal range now.  Does he feel any better? Any more energy/less fatigue since the injections?

## 2019-06-06 ENCOUNTER — OFFICE VISIT (OUTPATIENT)
Dept: ORTHOPEDICS | Facility: CLINIC | Age: 44
End: 2019-06-06
Payer: COMMERCIAL

## 2019-06-06 VITALS — BODY MASS INDEX: 46.65 KG/M2 | HEIGHT: 69 IN | WEIGHT: 315 LBS | HEART RATE: 96 BPM

## 2019-06-06 DIAGNOSIS — E88.819 INSULIN RESISTANCE: ICD-10-CM

## 2019-06-06 DIAGNOSIS — K21.9 GASTROESOPHAGEAL REFLUX DISEASE WITHOUT ESOPHAGITIS: ICD-10-CM

## 2019-06-06 DIAGNOSIS — M70.22 OLECRANON BURSITIS OF LEFT ELBOW: ICD-10-CM

## 2019-06-06 DIAGNOSIS — M70.21 OLECRANON BURSITIS OF RIGHT ELBOW: Primary | ICD-10-CM

## 2019-06-06 PROCEDURE — 20605: ICD-10-PCS | Mod: 50,S$GLB,, | Performed by: FAMILY MEDICINE

## 2019-06-06 PROCEDURE — 20605 DRAIN/INJ JOINT/BURSA W/O US: CPT | Mod: 50,S$GLB,, | Performed by: FAMILY MEDICINE

## 2019-06-06 PROCEDURE — 99214 PR OFFICE/OUTPT VISIT, EST, LEVL IV, 30-39 MIN: ICD-10-PCS | Mod: 25,S$GLB,, | Performed by: FAMILY MEDICINE

## 2019-06-06 PROCEDURE — 99999 PR PBB SHADOW E&M-EST. PATIENT-LVL III: CPT | Mod: PBBFAC,,, | Performed by: FAMILY MEDICINE

## 2019-06-06 PROCEDURE — 99214 OFFICE O/P EST MOD 30 MIN: CPT | Mod: 25,S$GLB,, | Performed by: FAMILY MEDICINE

## 2019-06-06 PROCEDURE — 99999 PR PBB SHADOW E&M-EST. PATIENT-LVL III: ICD-10-PCS | Mod: PBBFAC,,, | Performed by: FAMILY MEDICINE

## 2019-06-06 RX ORDER — TRIAMCINOLONE ACETONIDE 40 MG/ML
40 INJECTION, SUSPENSION INTRA-ARTICULAR; INTRAMUSCULAR
Status: DISCONTINUED | OUTPATIENT
Start: 2019-06-06 | End: 2019-06-06 | Stop reason: HOSPADM

## 2019-06-06 RX ADMIN — TRIAMCINOLONE ACETONIDE 40 MG: 40 INJECTION, SUSPENSION INTRA-ARTICULAR; INTRAMUSCULAR at 08:06

## 2019-06-06 NOTE — LETTER
Explanation of of BPCI/Medicare program provided. Patient was enrolled under [facility] Patient/family agreed to phone f/u for 3 months from [address] [facility] after discharge from [address] [place] [address].  BPCI/Medicare letter and brochure provided June 6, 2019      Crista Rinaldi MD  [address] [place] [address] [address] [place] [id number]           [facility]  [address] [place] [address] [place] [id number]  Phone: [phone]  Fax: [phone]          Patient: Dylan Ariza   MR Number: [id number]   YOB: 1975   Date of Visit: 6/6/2019       Dear Dr. Crista Rinaldi:    Thank you for referring Dylan Ariza to me for evaluation. Attached you will find relevant portions of my assessment and plan of care.    If you have questions, please do not hesitate to call me. I look forward to following Dylan Ariza along with you.    Sincerely,    Javier Rubi MD    Enclosure  CC:  No Recipients    If you would like to receive this communication electronically, please contact [email or web address] or [phone] to request more information on [employer] access.    For providers and/or their staff who would like to refer a patient to [facility], please contact us through our one-stop-shop provider referral line, [facility], at [phone].    If you feel you have received this communication in error or would no longer like to receive these types of communications, please e-mail [email or web address]

## 2019-06-06 NOTE — PROCEDURES
Intermediate Joint Aspiration/Injection: R olecranon bursa, L olecranon bursa  Date/Time: 6/6/2019 8:29 AM  Performed by: Javier Rubi MD  Authorized by: Javier Rubi MD     Consent Done?:  Yes (Verbal)  Indications:  Joint swelling, pain and diagnostic evaluation  Site marked: The procedure site was marked    Timeout: Prior to procedure the correct patient, procedure, and site was verified      Location:  Elbow  Site:  R olecranon bursa and L olecranon bursa  Prep: Patient was prepped and draped in usual sterile fashion    Ultrasonic Guidance for needle placement: No  Needle size:  27 G and 18 G  Approach:  Posterior  Medications:  40 mg triamcinolone acetonide 40 mg/mL; 40 mg triamcinolone acetonide 40 mg/mL  Aspirate amount (ml):  20  Patient tolerance:  Patient tolerated the procedure well with no immediate complications     Additional Comments: Patient experienced minimal blood loss (< 3 cc) and clean bandage was applied. Post procedure care was provided to the patient verbally and with their AVS. Patient was instructed to take it easy for the next 24-48 hours and was informed that their pain may increase once the anaesthesia wears off and before the steroid begins to work. Patient was instructed to ice area for 15 minutes and may take NSAIDs or Tylenol PRN if pain worsens. Patient was informed to contact clinic or go to the ED if they develop any fever, chills, redness, swelling, or other complications.

## 2019-06-06 NOTE — PROGRESS NOTES
Subjective:     Patient ID: Dylan Ariza is a 44 y.o. male.    Chief Complaint: Pain and Bursitis of the Left Elbow and Pain and Bursitis of the Right Elbow    Patient is a 44-year-old male presents clinic today complaining of bilateral elbow pain and swelling. Patient states that he has noticed this pain over the past several weeks.  Patient states that he has never had similar in the past.  States he has never had the persist drained.  States he does not remember injuring the elbows, but has significantly worsened over time.  States that is interfering with his job and makes it difficult to bend his elbow.  Denies any fevers, chills, redness, or surgery.      Past Medical History:   Diagnosis Date    Mixed hyperlipidemia 12/13/2018     Past Surgical History:   Procedure Laterality Date    SLEEVE GASTROPLASTY       Family History   Problem Relation Age of Onset    Heart disease Father     Hypertension Father     Cancer Paternal Grandmother     Diabetes Neg Hx      Social History     Socioeconomic History    Marital status: Single     Spouse name: Not on file    Number of children: Not on file    Years of education: Not on file    Highest education level: Not on file   Occupational History    Not on file   Social Needs    Financial resource strain: Not on file    Food insecurity:     Worry: Not on file     Inability: Not on file    Transportation needs:     Medical: Not on file     Non-medical: Not on file   Tobacco Use    Smoking status: Current Every Day Smoker    Smokeless tobacco: Never Used   Substance and Sexual Activity    Alcohol use: Yes    Drug use: No    Sexual activity: Yes   Lifestyle    Physical activity:     Days per week: Not on file     Minutes per session: Not on file    Stress: Not on file   Relationships    Social connections:     Talks on phone: Not on file     Gets together: Not on file     Attends [employer] service: Not on file     Active member of club or  "organization: Not on file     Attends meetings of clubs or organizations: Not on file     Relationship status: Not on file   Other Topics Concern    Not on file   Social History Narrative    Not on file     Medication List with Changes/Refills   Current Medications    BUTALBITAL-ACETAMINOPHEN-CAFFEINE -40 MG (FIORICET, ESGIC) -40 MG PER TABLET    Take 1 tablet by mouth every 6 (six) hours as needed for Headaches.    IBUPROFEN (ADVIL,MOTRIN) 800 MG TABLET    Take 1 tablet (800 mg total) by mouth 3 (three) times daily.    PANTOPRAZOLE (PROTONIX) 40 MG TABLET    Take 1 tablet (40 mg total) by mouth once daily.    ZOLPIDEM (AMBIEN) 10 MG TAB    TAKE 1 TABLET(10 MG) BY MOUTH EVERY NIGHT AS NEEDED     Review of patient's allergies indicates:  No Known Allergies  Review of Systems   Constitutional: Negative for chills, fever and malaise/fatigue.   HENT: Negative for hearing loss.    Eyes: Negative for redness.   Cardiovascular: Negative for leg swelling.   Gastrointestinal: Negative for nausea and vomiting.   Musculoskeletal: Positive for joint pain. Negative for back pain, falls and myalgias.   Skin: Negative for rash.   Neurological: Negative for tingling, sensory change, focal weakness and weakness.        Objective:   Body mass index is 54.49 kg/m².  Vitals:    06/06/19 0801   Pulse: 96   Weight: (!) 167.4 kg (369 lb)   Height: 5' 9" (1.753 m)   PainSc:   6   PainLoc: Elbow           General    Nursing note and vitals reviewed.  Constitutional: He is oriented to person, place, and time. He appears well-developed and well-nourished. No distress.   HENT:   Head: Normocephalic and atraumatic.   Eyes: Conjunctivae are normal. No scleral icterus.   Pulmonary/Chest: Effort normal.   Neurological: He is alert and oriented to person, place, and time.   Psychiatric: He has a normal mood and affect. His behavior is normal. Judgment and thought content normal.         Right Elbow Exam     Inspection   Scars: " absent  Effusion: present  Deformity: absent    Swelling   The patient is swollen on the olecranon    Tenderness   The patient is tender to palpation of the olecranon fossa.     Range of Motion   The patient has normal right elbow ROM.    Other   Sensation: normal      Left Elbow Exam     Inspection   Scars: absent  Effusion: present  Deformity: absent    Swelling   The patient is swollen on the olecranon    Tenderness   The patient is tender to palpation of the olecranon fossa.     Range of Motion   The patient has normal left elbow ROM.    Other   Sensation: normal          EXAMINATION:  XR ELBOW COMPLETE 3 VIEW BILATERAL    TECHNIQUE:  AP, lateral, and oblique views of bilateral elbow were performed.    COMPARISON:  None    FINDINGS:  There are no acute fractures or dislocations or joint effusion.  There is soft tissue swelling overlying the olecranon process, either from cellulitis or from olecranon bursitis.      Impression       1. Soft tissue swelling over the olecranon process either from olecranon bursitis or cellulitis.  2. No significant osseous abnormalities of the elbow.      Electronically signed by: Isaias Bolton MD  Date: 04/01/2019  Time: 14:25         Dylan was seen today for pain, bursitis, pain and bursitis.    Diagnoses and all orders for this visit:    Olecranon bursitis of right elbow  -     Intermediate Joint Aspiration/Injection: R olecranon bursa, L olecranon bursa    Olecranon bursitis of left elbow  -     Intermediate Joint Aspiration/Injection: R olecranon bursa, L olecranon bursa    Insulin resistance    Gastroesophageal reflux disease without esophagitis    -discussed the clinical course and nature of bilateral olecranon bursitis with patient.  At this time patient would like to conservative approach with cortisone injections and aspiration, NSAIDs/Tylenol, and compression Ace wrap.  If patient does not get significant relief, would recommend referral to Orthopedic surgery for further  evaluation.  -bilateral elbow Xray images were independently viewed and read by me showing no acute fractures or dislocations.  No significant degenerative changes.  Soft tissue swelling consistent with olecranon bursitis.  -Formal read by radiologist is as described above  -Discussed findings with patient    -Chronic insulin resistance.  Managed by patient's PCP.  -Chronic GERD.  Managed by patient's PCP.    -Treatment options and alternatives were discussed with the patient. Patient expressed understanding. Patient was given the opportunity to ask questions and be an active participant in their medical care. Patient had no further questions or concerns at this time.   -Patient is an overall moderate risk for health complications from their medical conditions.

## 2019-06-10 DIAGNOSIS — F51.01 PRIMARY INSOMNIA: ICD-10-CM

## 2019-06-11 RX ORDER — ZOLPIDEM TARTRATE 10 MG/1
TABLET ORAL
Qty: 30 TABLET | Refills: 0 | Status: SHIPPED | OUTPATIENT
Start: 2019-06-11 | End: 2019-07-15 | Stop reason: SDUPTHER

## 2019-07-15 DIAGNOSIS — F51.01 PRIMARY INSOMNIA: ICD-10-CM

## 2019-07-16 RX ORDER — ZOLPIDEM TARTRATE 10 MG/1
TABLET ORAL
Qty: 30 TABLET | Refills: 0 | Status: SHIPPED | OUTPATIENT
Start: 2019-07-16 | End: 2019-12-13 | Stop reason: SDUPTHER

## 2019-08-12 ENCOUNTER — TELEPHONE (OUTPATIENT)
Dept: INTERNAL MEDICINE | Facility: CLINIC | Age: 44
End: 2019-08-12

## 2019-08-23 ENCOUNTER — LAB VISIT (OUTPATIENT)
Dept: LAB | Facility: HOSPITAL | Age: 44
End: 2019-08-23
Attending: FAMILY MEDICINE
Payer: COMMERCIAL

## 2019-08-23 DIAGNOSIS — E78.00 ELEVATED CHOLESTEROL: ICD-10-CM

## 2019-08-23 DIAGNOSIS — R73.03 PREDIABETES: ICD-10-CM

## 2019-08-23 DIAGNOSIS — E88.09 HYPOALBUMINEMIA: ICD-10-CM

## 2019-08-23 LAB
ALBUMIN SERPL BCP-MCNC: 3.2 G/DL (ref 3.5–5.2)
ALP SERPL-CCNC: 96 U/L (ref 55–135)
ALT SERPL W/O P-5'-P-CCNC: 29 U/L (ref 10–44)
ANION GAP SERPL CALC-SCNC: 10 MMOL/L (ref 8–16)
AST SERPL-CCNC: 20 U/L (ref 10–40)
BILIRUB SERPL-MCNC: 0.4 MG/DL (ref 0.1–1)
BUN SERPL-MCNC: 15 MG/DL (ref 6–20)
CALCIUM SERPL-MCNC: 10 MG/DL (ref 8.7–10.5)
CHLORIDE SERPL-SCNC: 103 MMOL/L (ref 95–110)
CHOLEST SERPL-MCNC: 224 MG/DL (ref 120–199)
CHOLEST/HDLC SERPL: 5 {RATIO} (ref 2–5)
CO2 SERPL-SCNC: 26 MMOL/L (ref 23–29)
CREAT SERPL-MCNC: 1.1 MG/DL (ref 0.5–1.4)
EST. GFR  (AFRICAN AMERICAN): >60 ML/MIN/1.73 M^2
EST. GFR  (NON AFRICAN AMERICAN): >60 ML/MIN/1.73 M^2
ESTIMATED AVG GLUCOSE: 128 MG/DL (ref 68–131)
GLUCOSE SERPL-MCNC: 86 MG/DL (ref 70–110)
HBA1C MFR BLD HPLC: 6.1 % (ref 4–5.6)
HDLC SERPL-MCNC: 45 MG/DL (ref 40–75)
HDLC SERPL: 20.1 % (ref 20–50)
LDLC SERPL CALC-MCNC: 157.6 MG/DL (ref 63–159)
NONHDLC SERPL-MCNC: 179 MG/DL
POTASSIUM SERPL-SCNC: 4.3 MMOL/L (ref 3.5–5.1)
PROT SERPL-MCNC: 8.4 G/DL (ref 6–8.4)
SODIUM SERPL-SCNC: 139 MMOL/L (ref 136–145)
TRIGL SERPL-MCNC: 107 MG/DL (ref 30–150)

## 2019-08-23 PROCEDURE — 83036 HEMOGLOBIN GLYCOSYLATED A1C: CPT

## 2019-08-23 PROCEDURE — 36415 COLL VENOUS BLD VENIPUNCTURE: CPT

## 2019-08-23 PROCEDURE — 80053 COMPREHEN METABOLIC PANEL: CPT

## 2019-08-23 PROCEDURE — 80061 LIPID PANEL: CPT

## 2019-09-03 ENCOUNTER — PATIENT MESSAGE (OUTPATIENT)
Dept: INTERNAL MEDICINE | Facility: CLINIC | Age: 44
End: 2019-09-03

## 2019-09-03 DIAGNOSIS — M72.2 PLANTAR FASCIITIS: Primary | ICD-10-CM

## 2019-09-04 ENCOUNTER — TELEPHONE (OUTPATIENT)
Dept: INTERNAL MEDICINE | Facility: CLINIC | Age: 44
End: 2019-09-04

## 2019-09-10 ENCOUNTER — PATIENT MESSAGE (OUTPATIENT)
Dept: INTERNAL MEDICINE | Facility: CLINIC | Age: 44
End: 2019-09-10

## 2019-09-11 ENCOUNTER — PATIENT MESSAGE (OUTPATIENT)
Dept: PODIATRY | Facility: CLINIC | Age: 44
End: 2019-09-11

## 2019-09-13 ENCOUNTER — OFFICE VISIT (OUTPATIENT)
Dept: INTERNAL MEDICINE | Facility: CLINIC | Age: 44
End: 2019-09-13
Payer: COMMERCIAL

## 2019-09-13 VITALS
DIASTOLIC BLOOD PRESSURE: 96 MMHG | HEIGHT: 67 IN | TEMPERATURE: 98 F | WEIGHT: 315 LBS | OXYGEN SATURATION: 95 % | BODY MASS INDEX: 49.44 KG/M2 | HEART RATE: 124 BPM | SYSTOLIC BLOOD PRESSURE: 126 MMHG

## 2019-09-13 DIAGNOSIS — R05.9 COUGH: Primary | ICD-10-CM

## 2019-09-13 DIAGNOSIS — M79.10 MUSCLE PAIN: ICD-10-CM

## 2019-09-13 DIAGNOSIS — R09.82 POSTNASAL DRIP: ICD-10-CM

## 2019-09-13 PROBLEM — R94.31 NONSPECIFIC ABNORMAL ELECTROCARDIOGRAM (ECG) (EKG): Status: RESOLVED | Noted: 2018-11-30 | Resolved: 2019-09-13

## 2019-09-13 PROCEDURE — 99214 OFFICE O/P EST MOD 30 MIN: CPT | Mod: S$GLB,,, | Performed by: FAMILY MEDICINE

## 2019-09-13 PROCEDURE — 99214 PR OFFICE/OUTPT VISIT, EST, LEVL IV, 30-39 MIN: ICD-10-PCS | Mod: S$GLB,,, | Performed by: FAMILY MEDICINE

## 2019-09-13 PROCEDURE — 99999 PR PBB SHADOW E&M-EST. PATIENT-LVL III: CPT | Mod: PBBFAC,,, | Performed by: FAMILY MEDICINE

## 2019-09-13 PROCEDURE — 99999 PR PBB SHADOW E&M-EST. PATIENT-LVL III: ICD-10-PCS | Mod: PBBFAC,,, | Performed by: FAMILY MEDICINE

## 2019-09-13 RX ORDER — PHENTERMINE HYDROCHLORIDE 37.5 MG/1
37.5 TABLET ORAL
Qty: 30 TABLET | Refills: 0 | Status: SHIPPED | OUTPATIENT
Start: 2019-09-13 | End: 2019-10-11 | Stop reason: SDUPTHER

## 2019-09-13 RX ORDER — FLUTICASONE PROPIONATE 50 MCG
1 SPRAY, SUSPENSION (ML) NASAL DAILY
Qty: 1 BOTTLE | Refills: 0 | Status: SHIPPED | OUTPATIENT
Start: 2019-09-13 | End: 2023-02-20

## 2019-09-13 NOTE — PROGRESS NOTES
Subjective:       Patient ID: Dylan Ariza is a 44 y.o. male.    Chief Complaint: Follow-up    HPI Mr. Ariza presents today for follow up and cough.     Cough he has had it has been for a couple years  2-3 times a week   Feels like something is in his throat    He was started on Protonix and his symptoms went away but they have returned.   Last night coughed until he threw up.     Inquires about weight loss medication.      Pain on the side (right) or in the middle of the chest (sternal area). Lasting a minute or so   Has to let it pass   Pain described as a sharp pain.   Depending on how he moves.   Icy hot hasn't helped It usually has to go on its own.   1-2 times a month.     Review of Systems   Constitutional: Negative.    HENT: Negative.    Respiratory: Positive for cough.    Gastrointestinal: Negative.    Musculoskeletal: Positive for myalgias.   Allergic/Immunologic: Negative for environmental allergies.         Past Medical History:   Diagnosis Date    Mixed hyperlipidemia 12/13/2018     Past Surgical History:   Procedure Laterality Date    SLEEVE GASTROPLASTY       Family History   Problem Relation Age of Onset    Heart disease Father     Hypertension Father     Cancer Paternal Grandmother     Diabetes Neg Hx        Objective:        Physical Exam   Constitutional: He is oriented to person, place, and time. He appears well-developed and well-nourished.   HENT:   Head: Normocephalic and atraumatic.   Right Ear: External ear normal.   Left Ear: External ear normal.   Neurological: He is alert and oriented to person, place, and time.   Skin: Skin is warm.   Psychiatric: He has a normal mood and affect. His behavior is normal.   Vitals reviewed.        Results for orders placed or performed in visit on 08/23/19   Hemoglobin A1c   Result Value Ref Range    Hemoglobin A1C 6.1 (H) 4.0 - 5.6 %    Estimated Avg Glucose 128 68 - 131 mg/dL   Comprehensive metabolic panel   Result Value Ref Range     Sodium 139 136 - 145 mmol/L    Potassium 4.3 3.5 - 5.1 mmol/L    Chloride 103 95 - 110 mmol/L    CO2 26 23 - 29 mmol/L    Glucose 86 70 - 110 mg/dL    BUN, Bld 15 6 - 20 mg/dL    Creatinine 1.1 0.5 - 1.4 mg/dL    Calcium 10.0 8.7 - 10.5 mg/dL    Total Protein 8.4 6.0 - 8.4 g/dL    Albumin 3.2 (L) 3.5 - 5.2 g/dL    Total Bilirubin 0.4 0.1 - 1.0 mg/dL    Alkaline Phosphatase 96 55 - 135 U/L    AST 20 10 - 40 U/L    ALT 29 10 - 44 U/L    Anion Gap 10 8 - 16 mmol/L    eGFR if African American >60.0 >60 mL/min/1.73 m^2    eGFR if non African American >60.0 >60 mL/min/1.73 m^2   Lipid panel   Result Value Ref Range    Cholesterol 224 (H) 120 - 199 mg/dL    Triglycerides 107 30 - 150 mg/dL    HDL 45 40 - 75 mg/dL    LDL Cholesterol 157.6 63.0 - 159.0 mg/dL    Hdl/Cholesterol Ratio 20.1 20.0 - 50.0 %    Total Cholesterol/HDL Ratio 5.0 2.0 - 5.0    Non-HDL Cholesterol 179 mg/dL       Assessment/Plan:     Cough  -     fluticasone propionate (CHILDREN'S FLONASE ALLERGY RLF) 50 mcg/actuation nasal spray; 1 spray (50 mcg total) by Each Nostril route once daily.  Dispense: 1 Bottle; Refill: 0  He is still taking ppi   Will consider changing to Zantac pending response to flonase  Postnasal drip  -     fluticasone propionate (CHILDREN'S FLONASE ALLERGY RLF) 50 mcg/actuation nasal spray; 1 spray (50 mcg total) by Each Nostril route once daily.  Dispense: 1 Bottle; Refill: 0    Muscle pain  Stretching. He is planing to work on weight loss  Pain doesn't last long enough to treat with medication .  Will follow up in 1 month     BMI 50.0-59.9, adult  -     phentermine (ADIPEX-P) 37.5 mg tablet; Take 1 tablet (37.5 mg total) by mouth before breakfast.  Dispense: 30 tablet; Refill: 0    Diet changes and exercise   Recommend pool exercise and stationary bike since he has plantar fasciitis and other pain  Darcei is working on weight loss and has a good handle on diet changes. Patient does good when home but his job has him travel will work  on healthy choices away from home.     Follow up in about 4 weeks (around 10/11/2019).    Crista Rinaldi MD  ON   Family Medicine

## 2019-09-24 ENCOUNTER — PATIENT MESSAGE (OUTPATIENT)
Dept: INTERNAL MEDICINE | Facility: CLINIC | Age: 44
End: 2019-09-24

## 2019-09-24 RX ORDER — GABAPENTIN 100 MG/1
200 CAPSULE ORAL 3 TIMES DAILY
Qty: 180 CAPSULE | Refills: 0 | Status: SHIPPED | OUTPATIENT
Start: 2019-09-24 | End: 2019-10-11

## 2019-09-27 ENCOUNTER — PATIENT MESSAGE (OUTPATIENT)
Dept: PODIATRY | Facility: CLINIC | Age: 44
End: 2019-09-27

## 2019-09-27 ENCOUNTER — OFFICE VISIT (OUTPATIENT)
Dept: PODIATRY | Facility: CLINIC | Age: 44
End: 2019-09-27
Payer: COMMERCIAL

## 2019-09-27 VITALS
BODY MASS INDEX: 49.44 KG/M2 | SYSTOLIC BLOOD PRESSURE: 140 MMHG | RESPIRATION RATE: 17 BRPM | HEIGHT: 67 IN | DIASTOLIC BLOOD PRESSURE: 80 MMHG | WEIGHT: 315 LBS | HEART RATE: 72 BPM

## 2019-09-27 DIAGNOSIS — M24.572 CONTRACTURE, LEFT ANKLE: ICD-10-CM

## 2019-09-27 DIAGNOSIS — E66.01 MORBID OBESITY: ICD-10-CM

## 2019-09-27 DIAGNOSIS — M21.42 PES PLANUS OF BOTH FEET: ICD-10-CM

## 2019-09-27 DIAGNOSIS — M72.2 PLANTAR FASCIITIS: Primary | ICD-10-CM

## 2019-09-27 DIAGNOSIS — M21.41 PES PLANUS OF BOTH FEET: ICD-10-CM

## 2019-09-27 DIAGNOSIS — M79.672 PAIN IN LEFT FOOT: ICD-10-CM

## 2019-09-27 DIAGNOSIS — M79.671 PAIN IN RIGHT FOOT: ICD-10-CM

## 2019-09-27 DIAGNOSIS — M24.571 CONTRACTURE, RIGHT ANKLE: ICD-10-CM

## 2019-09-27 PROCEDURE — 99203 PR OFFICE/OUTPT VISIT, NEW, LEVL III, 30-44 MIN: ICD-10-PCS | Mod: 25,S$GLB,, | Performed by: PODIATRIST

## 2019-09-27 PROCEDURE — 20550 NJX 1 TENDON SHEATH/LIGAMENT: CPT | Mod: 50,S$GLB,, | Performed by: PODIATRIST

## 2019-09-27 PROCEDURE — 20550 PR INJECT TENDON SHEATH/LIGAMENT: ICD-10-PCS | Mod: 50,S$GLB,, | Performed by: PODIATRIST

## 2019-09-27 PROCEDURE — 99999 PR PBB SHADOW E&M-EST. PATIENT-LVL III: ICD-10-PCS | Mod: PBBFAC,,, | Performed by: PODIATRIST

## 2019-09-27 PROCEDURE — 99999 PR PBB SHADOW E&M-EST. PATIENT-LVL III: CPT | Mod: PBBFAC,,, | Performed by: PODIATRIST

## 2019-09-27 PROCEDURE — 99203 OFFICE O/P NEW LOW 30 MIN: CPT | Mod: 25,S$GLB,, | Performed by: PODIATRIST

## 2019-09-27 RX ORDER — DEXAMETHASONE SODIUM PHOSPHATE 4 MG/ML
4 INJECTION, SOLUTION INTRA-ARTICULAR; INTRALESIONAL; INTRAMUSCULAR; INTRAVENOUS; SOFT TISSUE ONCE
Status: COMPLETED | OUTPATIENT
Start: 2019-09-27 | End: 2019-09-27

## 2019-09-27 RX ORDER — METHYLPREDNISOLONE ACETATE 40 MG/ML
40 INJECTION, SUSPENSION INTRA-ARTICULAR; INTRALESIONAL; INTRAMUSCULAR; SOFT TISSUE
Status: COMPLETED | OUTPATIENT
Start: 2019-09-27 | End: 2019-09-27

## 2019-09-27 RX ADMIN — METHYLPREDNISOLONE ACETATE 40 MG: 40 INJECTION, SUSPENSION INTRA-ARTICULAR; INTRALESIONAL; INTRAMUSCULAR; SOFT TISSUE at 09:09

## 2019-09-27 RX ADMIN — DEXAMETHASONE SODIUM PHOSPHATE 4 MG: 4 INJECTION, SOLUTION INTRA-ARTICULAR; INTRALESIONAL; INTRAMUSCULAR; INTRAVENOUS; SOFT TISSUE at 09:09

## 2019-09-27 NOTE — PROGRESS NOTES
Subjective:       Patient ID: Dylan Ariza is a 44 y.o. male.    Chief Complaint: Heel Pain (Right and left heel pain,pain 5/10, wear tennis, non daibetic, PCP Dr. Rinaldi)      HPI: Dylan Ariza complains of moderate pains to the left and right foot. States pains are sharp and stabbing-like in nature. Pains are to the plantar foot, mostly with walking and standing. Rates the pains at approx. 8/10. States post-static dyskinesia. Denies any recent identifiable trauma. Does limp with gait. No NSAID medications thus far. Pains have been present for the past several weeks to months and the pains have worsened over the past couple of weeks. He does not have a history of plantar fasciitis. States walking and standing causes and/or exacerbates the symptoms. Patient has had no corticosteroid injection(s) to the right or left foot, prior. Patient's Primary Care Provider is Crista Rinaldi MD. Patient is morbidly obese.    Review of patient's allergies indicates:  No Known Allergies    Past Medical History:   Diagnosis Date    Mixed hyperlipidemia 12/13/2018       Family History   Problem Relation Age of Onset    Heart disease Father     Hypertension Father     Cancer Paternal Grandmother     Diabetes Neg Hx        Social History     Socioeconomic History    Marital status: Single     Spouse name: Not on file    Number of children: Not on file    Years of education: Not on file    Highest education level: Not on file   Occupational History    Not on file   Social Needs    Financial resource strain: Somewhat hard    Food insecurity:     Worry: Never true     Inability: Never true    Transportation needs:     Medical: No     Non-medical: No   Tobacco Use    Smoking status: Current Every Day Smoker    Smokeless tobacco: Never Used   Substance and Sexual Activity    Alcohol use: Yes     Frequency: 2-4 times a month     Drinks per session: 3 or 4     Binge frequency: Monthly    Drug use: No     "Sexual activity: Yes   Lifestyle    Physical activity:     Days per week: 0 days     Minutes per session: 10 min    Stress: Not at all   Relationships    Social connections:     Talks on phone: More than three times a week     Gets together: Once a week     Attends [employer] service: Not on file     Active member of club or organization: No     Attends meetings of clubs or organizations: Never     Relationship status: Living with partner   Other Topics Concern    Not on file   Social History Narrative    Not on file       Past Surgical History:   Procedure Laterality Date    SLEEVE GASTROPLASTY         Review of Systems   Constitutional: Negative for chills, fatigue and fever.   HENT: Negative for hearing loss.    Eyes: Negative for photophobia and visual disturbance.   Respiratory: Negative for cough, chest tightness, shortness of breath and wheezing.    Cardiovascular: Negative for chest pain and palpitations.   Gastrointestinal: Negative for constipation, diarrhea, nausea and vomiting.   Endocrine: Negative for cold intolerance and heat intolerance.   Genitourinary: Negative for flank pain.   Musculoskeletal: Positive for gait problem. Negative for neck pain and neck stiffness.   Skin: Negative for wound.   Neurological: Negative for light-headedness and headaches.   Psychiatric/Behavioral: Negative for sleep disturbance.         Objective:   BP (!) 140/80 (BP Location: Right arm, Patient Position: Sitting, BP Method: Medium (Automatic))   Pulse 72   Resp 17   Ht 5' 7" (1.702 m)   Wt (!) 164 kg (361 lb 8.9 oz)   BMI 56.63 kg/m²     Physical Exam   LOWER EXTREMITY PHYSICAL EXAMINATION    VASCULAR: The right DP pulse is 2/4 and the left DP is 2/4. The right PT pulse is 2/4 and the left PT pulse is 2/4. Proximal to distal, warm to warm. No dependent rubor or elevation palor is noted. Capillary refill time is less than 3 seconds. Hair growth is appreciated to the dorsal foot and digits.    NEUROLOGY: " Proprioception is intact, bilateral. Sensation to light touch is intact. Negative Tinel's Sign and negative Valleix Sign. No neurological sensations with compression of the area of Flowers's Nerve in the area of the Abductor Hallucis muscle belly.    ORTHOPEDIC: There is moderate tenderness to palpation of the area around the plantar medial calcaneal tubercle on the left and right foot. Pains are characterized as sharp and stabbing-like with direct palpation of the area. There is also moderate pain to palpation of the immediate plantar aspect of the heel, and mild pains to the lateral band of the fascia. No edema is noted. No fullness is noted. No pains or defects are noted within the plantar fascia at the arch. No plantar fibromas are noted. No defects are noted within the ligament. Dorsiflexion of the MTPJs with simultaneous palpation of the fascia at the arch, does worsen and exacerbate the pains. No pains with medial to lateral compression of the heel. Equinus contracture is noted. Antalgic gait pattern is noted.     DERMATOLOGY: No ecchymosis is noted.  Skin is supple, dry and intact. Skin is supple.  No hyperkeratosis noted. No calluses.  No open wounds or ulcerations are noted.  No palpable plantar fibromas noted.    Assessment:     1. Plantar fasciitis    2. Pain in right foot    3. Pain in left foot    4. Contracture, right ankle    5. Contracture, left ankle    6. Pes planus of both feet    7. Morbid obesity          Plan:     Plantar fasciitis  Pain in right foot  Pain in left foot  Contracture, right ankle  Contracture, left ankle  Pes planus of both feet  -     methylPREDNISolone acetate injection 40 mg  -     dexamethasone injection 4 mg    Thorough discussion is had with the patient today, concerning the diagnosis, its etiology, and the treatment algorithm at present.     Following sterile preparation with alcohol swab and/or betadine paint, injection was given into and around the area of the left and  right plantar medial calcaneal tubercle, using 25-gauge needle. Injection consisted of approximately 0.5cc of Dexamethasone Sodium Phosphate, 0.5cc of Depo-Medrol (40mg/dL) and 0.5cc of 1% Lidocaine w/ or w/o Epinephrine or 0.25% or 0.50% Marcaine plain. Bandage application thereafter. Patient tolerated procedure well, and without complications or complaints. Patient educated that the area of pathology, might actually be slightly more painful, due to the injection, over the course of the next one to two days.       Did discuss proper and supportive shoe gear in detail and at length with the patient.  These are shoes with firm and robust arch support; medial counter.  Shoes which only bend at the metatarsophalangeal joint and which are rigid in the midfoot and hindfoot. Patient urged to purchase running type or cross training type shoes gear which are designed for pronation control.     Morbid obesity  Patient is counseled and reminded concerning the importance of good nutrition and healthy eating habits, which may include blood sugar control, to prevent and/or help podiatric foot and ankle complications.          Future Appointments   Date Time Provider Department Center   10/11/2019  2:00 PM Crista Rinaldi MD [facility]

## 2019-10-01 ENCOUNTER — PATIENT MESSAGE (OUTPATIENT)
Dept: PODIATRY | Facility: CLINIC | Age: 44
End: 2019-10-01

## 2019-10-01 ENCOUNTER — PATIENT MESSAGE (OUTPATIENT)
Dept: INTERNAL MEDICINE | Facility: CLINIC | Age: 44
End: 2019-10-01

## 2019-10-01 ENCOUNTER — TELEPHONE (OUTPATIENT)
Dept: INTERNAL MEDICINE | Facility: CLINIC | Age: 44
End: 2019-10-01

## 2019-10-01 ENCOUNTER — TELEPHONE (OUTPATIENT)
Dept: PAIN MEDICINE | Facility: CLINIC | Age: 44
End: 2019-10-01

## 2019-10-01 DIAGNOSIS — M79.10 MUSCLE PAIN: Primary | ICD-10-CM

## 2019-10-01 NOTE — TELEPHONE ENCOUNTER
----- Message from Vaishali Saldana sent at 10/1/2019  5:43 PM CDT -----  Contact: portal  Appointment Request From: Dylan Ariza    With Provider: Sumi Tao MD [[facility]]    Preferred Date Range: 10/2/2019 - 10/4/2019    Preferred Times: Any time    Reason for visit: Nerve pain    Comments:  Nerve pain

## 2019-10-01 NOTE — TELEPHONE ENCOUNTER
----- Message from Michael Chance sent at 10/1/2019 11:24 AM CDT -----  ..Type:  Patient Returning Call    Who Called:pt   Who Left Message for Patient:  Does the patient know what this is regarding? Pain in leg  Would the patient rather a call back or a response via [facility]? Call back   Best Call Back Number: [phone]  Additional Information: pt is requesting a call from nurse to discuss a pain in his left leg

## 2019-10-01 NOTE — TELEPHONE ENCOUNTER
Appt scheduled 10/4/2019 2:20pm.  Attempted to contact pt. No answer. Left appt time date and location on voicemail.//lp

## 2019-10-02 ENCOUNTER — OFFICE VISIT (OUTPATIENT)
Dept: PHYSICAL MEDICINE AND REHAB | Facility: CLINIC | Age: 44
End: 2019-10-02
Payer: COMMERCIAL

## 2019-10-02 ENCOUNTER — PATIENT MESSAGE (OUTPATIENT)
Dept: INTERNAL MEDICINE | Facility: CLINIC | Age: 44
End: 2019-10-02

## 2019-10-02 ENCOUNTER — TELEPHONE (OUTPATIENT)
Dept: PHYSICAL MEDICINE AND REHAB | Facility: CLINIC | Age: 44
End: 2019-10-02

## 2019-10-02 VITALS
HEIGHT: 67 IN | SYSTOLIC BLOOD PRESSURE: 141 MMHG | BODY MASS INDEX: 49.44 KG/M2 | DIASTOLIC BLOOD PRESSURE: 100 MMHG | WEIGHT: 315 LBS | HEART RATE: 110 BPM

## 2019-10-02 DIAGNOSIS — M79.605 LEFT LEG PAIN: ICD-10-CM

## 2019-10-02 DIAGNOSIS — M54.16 LUMBAR RADICULOPATHY: Primary | ICD-10-CM

## 2019-10-02 PROCEDURE — 99204 OFFICE O/P NEW MOD 45 MIN: CPT | Mod: S$GLB,,, | Performed by: PHYSICAL MEDICINE & REHABILITATION

## 2019-10-02 PROCEDURE — 99204 PR OFFICE/OUTPT VISIT, NEW, LEVL IV, 45-59 MIN: ICD-10-PCS | Mod: S$GLB,,, | Performed by: PHYSICAL MEDICINE & REHABILITATION

## 2019-10-02 PROCEDURE — 99999 PR PBB SHADOW E&M-EST. PATIENT-LVL III: CPT | Mod: PBBFAC,,, | Performed by: PHYSICAL MEDICINE & REHABILITATION

## 2019-10-02 PROCEDURE — 99999 PR PBB SHADOW E&M-EST. PATIENT-LVL III: ICD-10-PCS | Mod: PBBFAC,,, | Performed by: PHYSICAL MEDICINE & REHABILITATION

## 2019-10-02 RX ORDER — PNV NO.95/FERROUS FUM/FOLIC AC 28MG-0.8MG
22 TABLET ORAL DAILY PRN
COMMUNITY
End: 2023-02-20

## 2019-10-02 RX ORDER — AMITRIPTYLINE HYDROCHLORIDE 25 MG/1
25 TABLET, FILM COATED ORAL NIGHTLY PRN
Qty: 30 TABLET | Refills: 1 | Status: SHIPPED | OUTPATIENT
Start: 2019-10-02 | End: 2019-10-11 | Stop reason: SDUPTHER

## 2019-10-02 NOTE — PATIENT INSTRUCTIONS
Possible Causes of Low Back or Leg Pain    The symptoms in your back or leg may be due to pressure on a nerve. This pressure may be caused by a damaged disk or by abnormal bone growth. Either way, you may feel pain, burning, tingling, or numbness. If you have pressure on a nerve that connects to the sciatic nerve, pain may shoot down your leg.    Pressure from the disk  Constant wear and tear can weaken a disk over time and cause back pain. The disk can then be damaged by a sudden movement or injury. If its soft center begins to bulge, the disk may press on a nerve. Or the outside of the disk may tear, and the soft center may squeeze through and pinch a nerve.    Pressure from bone  As a disk wears out, the vertebrae right above and below the disk begin to touch. This can put pressure on a nerve. Often, abnormal bone (called bone spurs) grows where the vertebrae rub against each other. This can cause the foramen or the spinal canal to narrow (called stenosis) and press against a nerve.  Date Last Reviewed: 10/4/2015  © 0361-0282 [email or web address]. [address] [place] [address]. All rights reserved. This information is not intended as a substitute for professional medical care. Always follow your healthcare professional's instructions.        Prone Multifidus Activation (Strength)    1. Lie on your stomach on the floor with a pillow under your hips. You can lie on a mat or towel.  2. Hold your arms straight along your sides.  3. Slowly raise your chest off the floor, gently pulling your arms behind you. Keep your neck straight and your ears in line with your shoulders. Hold for 30 seconds, then lie back down.  4. Repeat 5 times, or as instructed.  Date Last Reviewed: 5/1/2016  © 4409-0914 [email or web address]. [address] [place] [address]. All rights reserved. This information is not intended as a substitute for professional medical care. Always follow your healthcare  professional's instructions.        Back Safety: Lifting  Lifting can strain or even injure your back. Follow these tips to keep your back safe while you bend, lift, and carry.      Protect Your Back While Lifting       Step 1:  · Face the object.  · With your back straight, get down on one knee.  · If you can, tilt the object so one side lifts off the ground.  · Keep the object close to you. Step 2:  · Tighten your stomach muscles.  · Use your legs, arms, and buttocks to lift, not your back.  · Avoid twisting.  · Lift the object to your knee.  · Grasp the object firmly. Step 3:  · Lift with your arms and legs, not your back.  · Move quickly to help make this easier.   To carry an object:  · Hold it close to your body.  · Bend your knees slightly as you walk. The heavier the object, the more you should bend your knees.  · Get help with heavy or unbalanced objects.  Date Last Reviewed: 8/31/2015  © 7876-9685 [employer]. [address] [place] [address]. All rights reserved. This information is not intended as a substitute for professional medical care. Always follow your healthcare professional's instructions.        Back Safety: Basics of Good Posture  Good posture helps protect you from injury. It also increases your comfort. Aim for good posture throughout the day.    Check your posture  The human body works best when it is properly aligned. To improve your standing posture, follow these steps:  · Take a moment to close your eyes and feel your body. Then breathe deeply and relax your shoulders, hips, and knees.  · Now, from the very top of your head, lift up just a bit. Think of a line linking your ears, shoulders, hips, and ankles. Adjust your body to follow the line. You may need to relax your hips and tuck your buttocks under a bit.  · Next, take a look at yourself in a mirror. Is one ear, shoulder, or hip higher than the other? They should be level.  Check how you sit  When you sit  properly, pressure on your back is reduced. Try these steps:  · Sit so that the curve of your lower back fits easily against the chair. Keep your gaze level.  · Support your feet. They should be flat on the floor or on a footrest. Your knees should be level with your hips.  · Adjust the chair height as needed. Sit so your forearms are level with the work surface.  Proper posture helps  When your back is aligned, its more likely to stay safe throughout the day.  · Standing in place. Rest one foot on a stool or low box to ease pressure on your lower back. Switch feet often. If you can, adjust the height of your work surface so your neck and shoulders arent under strain.  · Driving. Sit close enough to the steering wheel to keep your knees slightly bent. For comfort, your knees should be level with your hips or just a bit lower. Sit as straight as you can. The curve of your lower back should be fully supported.  · Walking. Stand tall and walk with your head up. Let your arms swing while you walk. This helps relax muscles. Wear shoes that fit and support your feet. If you will be standing or walking for a long time, dont wear high heels.  · Sitting and sleeping. Choose your furniture with care. Make sure its not causing or increasing your back pain. Chairs should allow for comfortable, correct sitting posture. Use pillows for added support if needed. Your bed should support your backs natural curves without being too hard or too soft.  Date Last Reviewed: 10/18/2015  © 2883-3408 [employer]. [address] [place] [address], [place] [address]. All rights reserved. This information is not intended as a substitute for professional medical care. Always follow your healthcare professional's instructions.

## 2019-10-02 NOTE — TELEPHONE ENCOUNTER
Faxed referral for Physical Therapy to [facility] @ [phone]. Received fax confirmation.

## 2019-10-02 NOTE — PROGRESS NOTES
PM&R NEW PATIENT HISTORY & PHYSICAL:    Referring Physician: No ref. provider found    Chief Complaint   Patient presents with    Leg Pain     Left leg pain       HPI: This is a 44 y.o.  male being seen in clinic today for evaluation of Leg Pain (Left leg pain)   The problem first began several weeks, possibly brought on by walking 30 minutes in the park, something he wasn't acustomed to doing. He feels burning and tingling pain in his left thigh radiating into leg, but not to the foot. The symptoms are worsening. He has tried gabapentin 200 mg TID, then 400 mg TID, and  mg without improvement. He has not tried physical therapy. Denies prior symptoms like this. Notes left leg gave out on him several times yesterday. Works as a  for commercial [employer], involves off positions and heavy lifting. No pain in back.     History obtained from patient.    Past family, medical, social, surgical history, and vital signs reviewed in chart.    Review of Systems   Constitutional: Negative for chills, fever and weight loss.   HENT: Negative for hearing loss and sore throat.    Eyes: Negative for blurred vision, photophobia and pain.   Respiratory: Negative for shortness of breath.    Cardiovascular: Negative for chest pain.   Gastrointestinal: Negative for abdominal pain.   Genitourinary: Negative for dysuria.   Skin: Negative for rash.   Neurological: Negative for tingling and headaches.   Endo/Heme/Allergies: Does not bruise/bleed easily.   Psychiatric/Behavioral: Negative for depression.       General    Nursing note and vitals reviewed.  Constitutional: He is oriented to person, place, and time. He appears well-developed and well-nourished.   HENT:   Head: Normocephalic and atraumatic.   Eyes: Conjunctivae and EOM are normal. Pupils are equal, round, and reactive to light.   Neck: Neck supple.   Cardiovascular: Intact distal pulses.    Pulmonary/Chest: Effort normal. No respiratory distress.    Abdominal: He exhibits no distension.   Neurological: He is alert and oriented to person, place, and time. He has normal reflexes.   Morbidly obese   Psychiatric: He has a normal mood and affect.     General Musculoskeletal Exam   Gait: circumducted and Trendelenburg     Back (L-Spine & T-Spine) / Neck (C-Spine) Exam     Tenderness Posterior midline palpation reveals tenderness of the Lower L-Spine, Sacrum and Upper L-Spine.     Back (L-Spine & T-Spine) Range of Motion   Extension: abnormal   Back flexion: likely limited by body habitus.   Rotation right: normal   Rotation left: normal     Spinal Sensation   Right Side Sensation  L-Spine Level: normal  Left Side Sensation  L-Spine Level: normal    Back (L-Spine & T-Spine) Tests   Right Side Tests  Straight leg raise:      Sitting SLR: > 70 degrees      Left Side Tests  Straight leg raise:     Sitting SLR: > 70 degrees          Other   Spinal Kyphosis:  Absent  Spinal Lordosis:  Absent    Comments:  No tenderness or abnormal sensation over lateral left thigh/leg in distribution of his pain.      Muscle Strength   Right Lower Extremity   Hip Abduction: 4/5   Hip Flexion: 4/5   Hip Extensors: 4/5  Quadriceps:  5/5   Hamstrin/5   Anterior tibial:  5/5/5  Gastrocsoleus:  5/5/5  EHL:  5/5  Left Lower Extremity   Hip Abduction: 4/5   Hip Flexion: 4/5   Hip Extensors: 4/5  Quadriceps:  5/5   Hamstrin/5   Anterior tibial:  5/5/5   Gastrocsoleus:  5/5/5  EHL:  5/5    Reflexes     Left Side  Quadriceps:  2+  Achilles:  2+  Ankle Clonus:  absent    Right Side   Quadriceps:  2+  Achilles:  2+  Ankle Clonus:  absent    Vascular Exam     Right Pulses  Dorsalis Pedis:      2+          Left Pulses  Dorsalis Pedis:      2+          IMPRESSION/PLAN: Dylan is a 44 y.o.  male with:    1. Lumbar radiculopathy  - X-Ray Lumbar Spine Ap And Lateral; Future  - Ambulatory referral to Physical Therapy  - amitriptyline (ELAVIL) 25 MG tablet; Take 1 tablet (25 mg total) by mouth  nightly as needed for Insomnia.  Dispense: 30 tablet; Refill: 1    2. Left leg pain       The findings were discussed with Dylan in detail. I agree with him this may be nerve pain given his otherwise benign physical exam. We'll get x-rays today, but the views will likely be obscured by body habitus. He'll wean the gabapentin and try elavil at night for sleep and nerve pain. He'll continue the IBU and monitor for abdominal pain/GERD. He'll start therapy at [facility] in [place]. All of his questions were answered. He was provided this plan in writing. He will follow-up with me in 4 - 6 weeks.     Sumi Tao M.D.  Physical Medicine and Rehab

## 2019-10-03 ENCOUNTER — OFFICE VISIT (OUTPATIENT)
Dept: INTERNAL MEDICINE | Facility: CLINIC | Age: 44
End: 2019-10-03
Payer: COMMERCIAL

## 2019-10-03 ENCOUNTER — PATIENT MESSAGE (OUTPATIENT)
Dept: INTERNAL MEDICINE | Facility: CLINIC | Age: 44
End: 2019-10-03

## 2019-10-03 ENCOUNTER — PATIENT MESSAGE (OUTPATIENT)
Dept: PHYSICAL MEDICINE AND REHAB | Facility: CLINIC | Age: 44
End: 2019-10-03

## 2019-10-03 VITALS
HEART RATE: 106 BPM | SYSTOLIC BLOOD PRESSURE: 130 MMHG | DIASTOLIC BLOOD PRESSURE: 90 MMHG | TEMPERATURE: 96 F | WEIGHT: 315 LBS | OXYGEN SATURATION: 96 % | BODY MASS INDEX: 54.18 KG/M2

## 2019-10-03 DIAGNOSIS — M79.605 LEFT LEG PAIN: ICD-10-CM

## 2019-10-03 DIAGNOSIS — M79.652 LEFT THIGH PAIN: Primary | ICD-10-CM

## 2019-10-03 DIAGNOSIS — M54.16 LUMBAR RADICULOPATHY, ACUTE: ICD-10-CM

## 2019-10-03 PROCEDURE — 99999 PR PBB SHADOW E&M-EST. PATIENT-LVL IV: ICD-10-PCS | Mod: PBBFAC,,, | Performed by: FAMILY MEDICINE

## 2019-10-03 PROCEDURE — 99999 PR PBB SHADOW E&M-EST. PATIENT-LVL IV: CPT | Mod: PBBFAC,,, | Performed by: FAMILY MEDICINE

## 2019-10-03 PROCEDURE — 99214 OFFICE O/P EST MOD 30 MIN: CPT | Mod: S$GLB,,, | Performed by: FAMILY MEDICINE

## 2019-10-03 PROCEDURE — 99214 PR OFFICE/OUTPT VISIT, EST, LEVL IV, 30-39 MIN: ICD-10-PCS | Mod: S$GLB,,, | Performed by: FAMILY MEDICINE

## 2019-10-03 RX ORDER — TIZANIDINE 4 MG/1
4 TABLET ORAL EVERY 6 HOURS PRN
Qty: 40 TABLET | Refills: 1 | Status: SHIPPED | OUTPATIENT
Start: 2019-10-03 | End: 2019-10-13

## 2019-10-03 RX ORDER — TRAMADOL HYDROCHLORIDE 50 MG/1
50 TABLET ORAL EVERY 6 HOURS PRN
Qty: 30 TABLET | Refills: 0 | Status: SHIPPED | OUTPATIENT
Start: 2019-10-03 | End: 2020-05-27

## 2019-10-03 NOTE — PROGRESS NOTES
Subjective:       Patient ID: Dylan Ariza is a 44 y.o. male.    Chief Complaint: Hip Pain (left)    HPI Mr. Ariza presents today for hip pain.   Symptoms started 2 weeks.   This pain is the left posterior thigh traveling down the leg toward shin.     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 Negative for unexpected weight change.   HENT: Negative for hearing loss, rhinorrhea and trouble swallowing.    Eyes: Negative for discharge and visual disturbance.   Respiratory: Negative for chest tightness and wheezing.    Cardiovascular: Negative for chest pain and palpitations.   Gastrointestinal: Negative for blood in stool, constipation, diarrhea and vomiting.   Endocrine: Negative for polydipsia and polyuria.   Genitourinary: Negative for difficulty urinating, hematuria and urgency.   Musculoskeletal: Negative for arthralgias, joint swelling and neck pain.   Neurological: Negative for weakness and headaches.   Psychiatric/Behavioral: Negative for confusion and dysphoric mood.           Past Medical History:   Diagnosis Date    Mixed hyperlipidemia 12/13/2018     Past Surgical History:   Procedure Laterality Date    SLEEVE GASTROPLASTY       Family History   Problem Relation Age of Onset    Heart disease Father     Hypertension Father     Cancer Paternal Grandmother     Diabetes Neg Hx      Social History     Socioeconomic History    Marital status: Single     Spouse name: Not on file    Number of children: Not on file    Years of education: Not on file    Highest education level: Not on file   Occupational History    Not on file   Social Needs    Financial resource strain: Somewhat hard    Food insecurity:     Worry: Never true     Inability: Never true    Transportation  needs:     Medical: No     Non-medical: No   Tobacco Use    Smoking status: Current Every Day Smoker    Smokeless tobacco: Never Used   Substance and Sexual Activity    Alcohol use: Yes     Frequency: 2-4 times a month     Drinks per session: 3 or 4     Binge frequency: Monthly    Drug use: No    Sexual activity: Yes   Lifestyle    Physical activity:     Days per week: 0 days     Minutes per session: 10 min    Stress: Not at all   Relationships    Social connections:     Talks on phone: More than three times a week     Gets together: Once a week     Attends Advent service: Not on file     Active member of club or organization: No     Attends meetings of clubs or organizations: Never     Relationship status: Living with partner   Other Topics Concern    Not on file   Social History Narrative    Not on file       Objective:        Physical Exam   Constitutional: He is oriented to person, place, and time. He appears well-nourished. He does not appear ill.   HENT:   Head: Normocephalic and atraumatic.   Right Ear: External ear normal.   Left Ear: External ear normal.   Eyes: EOM are normal. Right eye exhibits no discharge. Left eye exhibits no discharge. No scleral icterus.   Neck: Normal range of motion. Neck supple. No thyromegaly present.   Pulmonary/Chest: He has no wheezes.   Musculoskeletal: He exhibits no edema.   Seems uncomfortable  Walking evokes pain  nontender to palpation   Neurological: He is alert and oriented to person, place, and time. He has normal reflexes.   Skin: Skin is warm. No rash noted. No erythema.   Psychiatric: He has a normal mood and affect. His behavior is normal.   Nursing note and vitals reviewed.        Results for orders placed or performed in visit on 08/23/19   Hemoglobin A1c   Result Value Ref Range    Hemoglobin A1C 6.1 (H) 4.0 - 5.6 %    Estimated Avg Glucose 128 68 - 131 mg/dL   Comprehensive metabolic panel   Result Value Ref Range    Sodium 139 136 - 145 mmol/L     Potassium 4.3 3.5 - 5.1 mmol/L    Chloride 103 95 - 110 mmol/L    CO2 26 23 - 29 mmol/L    Glucose 86 70 - 110 mg/dL    BUN, Bld 15 6 - 20 mg/dL    Creatinine 1.1 0.5 - 1.4 mg/dL    Calcium 10.0 8.7 - 10.5 mg/dL    Total Protein 8.4 6.0 - 8.4 g/dL    Albumin 3.2 (L) 3.5 - 5.2 g/dL    Total Bilirubin 0.4 0.1 - 1.0 mg/dL    Alkaline Phosphatase 96 55 - 135 U/L    AST 20 10 - 40 U/L    ALT 29 10 - 44 U/L    Anion Gap 10 8 - 16 mmol/L    eGFR if African American >60.0 >60 mL/min/1.73 m^2    eGFR if non African American >60.0 >60 mL/min/1.73 m^2   Lipid panel   Result Value Ref Range    Cholesterol 224 (H) 120 - 199 mg/dL    Triglycerides 107 30 - 150 mg/dL    HDL 45 40 - 75 mg/dL    LDL Cholesterol 157.6 63.0 - 159.0 mg/dL    Hdl/Cholesterol Ratio 20.1 20.0 - 50.0 %    Total Cholesterol/HDL Ratio 5.0 2.0 - 5.0    Non-HDL Cholesterol 179 mg/dL       Assessment/Plan:     Left thigh pain  -     tiZANidine (ZANAFLEX) 4 MG tablet; Take 1 tablet (4 mg total) by mouth every 6 (six) hours as needed.  Dispense: 40 tablet; Refill: 1  -     traMADol (ULTRAM) 50 mg tablet; Take 1 tablet (50 mg total) by mouth every 6 (six) hours as needed for Pain.  Dispense: 30 tablet; Refill: 0    Left leg pain  -     tiZANidine (ZANAFLEX) 4 MG tablet; Take 1 tablet (4 mg total) by mouth every 6 (six) hours as needed.  Dispense: 40 tablet; Refill: 1  -     traMADol (ULTRAM) 50 mg tablet; Take 1 tablet (50 mg total) by mouth every 6 (six) hours as needed for Pain.  Dispense: 30 tablet; Refill: 0    Lumbar radiculopathy, acute  -     X-Ray Lumbar Spine Complete 5 View; Future; Expected date: 10/03/2019      Discussed medication given by physiatry. Added muscle relaxer will use tonight with elavil if not too drowsy will increase Elavil to 50 mg the next night.  Will consider lyrica  Tramadol given today b/c of the weekend and my absence on tomorrow incase he needs something.   Discussed physical therapy  Addressed time off and explained why   [place] response was to not give him time off for back pain. Working on weight loss so patient is not to be completely inactive.   His job requires a lot of stooping, bending and lifting heavy objects  Considering his job and possible sciatic pain will write letter for a week off and will reassess.   Gave stretches today    Follow up in about 1 week (around 10/10/2019).    Crista Rinaldi MD  [facility]   Family Medicine

## 2019-10-03 NOTE — LETTER
October 3, 2019      [facility]  [id number] [place] [id number]  Phone: [phone]  Fax: [phone]       Patient: Dylan Ariza   YOB: 1975  Date of Visit: 10/03/2019    To Whom It May Concern:    Mi Ariza  was at [facility] on 10/03/2019. He may return to work on 10/14/2019 with no restrictions. Please send short term leave paperwork if applicable (FMLA). If you have any questions or concerns, or if I can be of further assistance, please do not hesitate to contact me.    Sincerely,      Crista Rinaldi MD

## 2019-10-04 ENCOUNTER — HOSPITAL ENCOUNTER (OUTPATIENT)
Dept: RADIOLOGY | Facility: HOSPITAL | Age: 44
Discharge: HOME | End: 2019-10-04
Attending: FAMILY MEDICINE
Payer: COMMERCIAL

## 2019-10-04 ENCOUNTER — PATIENT MESSAGE (OUTPATIENT)
Dept: INTERNAL MEDICINE | Facility: CLINIC | Age: 44
End: 2019-10-04

## 2019-10-04 DIAGNOSIS — M54.16 LUMBAR RADICULOPATHY, ACUTE: ICD-10-CM

## 2019-10-04 PROCEDURE — 72110 XR LUMBAR SPINE COMPLETE 5 VIEW: ICD-10-PCS | Mod: 26,,, | Performed by: RADIOLOGY

## 2019-10-04 PROCEDURE — 72110 X-RAY EXAM L-2 SPINE 4/>VWS: CPT | Mod: 26,,, | Performed by: RADIOLOGY

## 2019-10-04 PROCEDURE — 72110 X-RAY EXAM L-2 SPINE 4/>VWS: CPT | Mod: TC

## 2019-10-09 ENCOUNTER — PATIENT MESSAGE (OUTPATIENT)
Dept: FAMILY MEDICINE | Facility: CLINIC | Age: 44
End: 2019-10-09

## 2019-10-09 ENCOUNTER — TELEPHONE (OUTPATIENT)
Dept: INTERNAL MEDICINE | Facility: CLINIC | Age: 44
End: 2019-10-09

## 2019-10-09 NOTE — TELEPHONE ENCOUNTER
----- Message from Elena Huitron sent at 10/9/2019 12:18 PM CDT -----  Vianca wilkes/[employer] is calling in regards to pt [place] paperwork. Caller states that she has some questions about the paperwork.     [phone] and just ask for Vianca Dhaliwal

## 2019-10-11 ENCOUNTER — OFFICE VISIT (OUTPATIENT)
Dept: INTERNAL MEDICINE | Facility: CLINIC | Age: 44
End: 2019-10-11
Payer: COMMERCIAL

## 2019-10-11 VITALS
BODY MASS INDEX: 54.14 KG/M2 | TEMPERATURE: 96 F | OXYGEN SATURATION: 97 % | WEIGHT: 315 LBS | SYSTOLIC BLOOD PRESSURE: 124 MMHG | DIASTOLIC BLOOD PRESSURE: 96 MMHG | HEART RATE: 102 BPM

## 2019-10-11 DIAGNOSIS — M54.16 LUMBAR RADICULOPATHY: ICD-10-CM

## 2019-10-11 PROCEDURE — 99213 PR OFFICE/OUTPT VISIT, EST, LEVL III, 20-29 MIN: ICD-10-PCS | Mod: S$GLB,,, | Performed by: FAMILY MEDICINE

## 2019-10-11 PROCEDURE — 99213 OFFICE O/P EST LOW 20 MIN: CPT | Mod: S$GLB,,, | Performed by: FAMILY MEDICINE

## 2019-10-11 PROCEDURE — 99999 PR PBB SHADOW E&M-EST. PATIENT-LVL IV: ICD-10-PCS | Mod: PBBFAC,,, | Performed by: FAMILY MEDICINE

## 2019-10-11 PROCEDURE — 99999 PR PBB SHADOW E&M-EST. PATIENT-LVL IV: CPT | Mod: PBBFAC,,, | Performed by: FAMILY MEDICINE

## 2019-10-11 RX ORDER — PHENTERMINE HYDROCHLORIDE 37.5 MG/1
37.5 TABLET ORAL
Qty: 30 TABLET | Refills: 0 | Status: SHIPPED | OUTPATIENT
Start: 2019-10-11 | End: 2019-10-14 | Stop reason: SDUPTHER

## 2019-10-11 RX ORDER — AMITRIPTYLINE HYDROCHLORIDE 50 MG/1
50 TABLET, FILM COATED ORAL NIGHTLY PRN
Qty: 90 TABLET | Refills: 1 | Status: SHIPPED | OUTPATIENT
Start: 2019-10-11 | End: 2020-04-23

## 2019-10-11 NOTE — LETTER
October 11, 2019    Dylan Ariza  [address]  [facility] [address]             [facility]  [address] [place] [address]  Phone: [phone]  Fax: [phone] Dear {MR/MRS/MS/:23849} To:    ***      If you have any questions or concerns, please don't hesitate to call.    Sincerely,        Crista Rinaldi MD

## 2019-10-11 NOTE — LETTER
October 11, 2019      [facility]  [address] [place] [id number]  Phone: [phone]  Fax: [phone]       Patient: Dylan Ariza   YOB: 1975  Date of Visit: 10/11/2019    To Whom It May Concern:    Mi Ariza  was at [facility] on 10/11/2019. He may return to work on 10/15/2019 with no restrictions. If you have any questions or concerns, or if I can be of further assistance, please do not hesitate to contact me.      Sincerely,      Crista Rinaldi MD

## 2019-10-11 NOTE — PROGRESS NOTES
Subjective:       Patient ID: Dylan Ariza is a 44 y.o. male.    Chief Complaint: Follow-up    HPI Mr. Ariza presents today for follow up weight and leg pain.   Has had adipex for about 4 weeks   9/13/19 362#s   Today 345#s     Left leg pain has improved  Last appt added muscle relaxer Zanaflex which helped.   Patient feels he can go back to work.   The more activity he does he may have discomfort and pain later.     Review of Systems   Constitutional: Negative for activity change and unexpected weight change.   Eyes: Negative for discharge.   Respiratory: Negative for wheezing.    Cardiovascular: Negative for chest pain and palpitations.   Gastrointestinal: Negative for constipation, diarrhea and vomiting.   Genitourinary: Negative for difficulty urinating and hematuria.   Neurological: Negative for headaches.   Psychiatric/Behavioral: Negative for dysphoric mood.           Past Medical History:   Diagnosis Date    Mixed hyperlipidemia 12/13/2018     Past Surgical History:   Procedure Laterality Date    SLEEVE GASTROPLASTY       Family History   Problem Relation Age of Onset    Heart disease Father     Hypertension Father     Cancer Paternal Grandmother     Diabetes Neg Hx      Social History     Socioeconomic History    Marital status: Single     Spouse name: Not on file    Number of children: Not on file    Years of education: Not on file    Highest education level: Not on file   Occupational History    Not on file   Social Needs    Financial resource strain: Somewhat hard    Food insecurity:     Worry: Never true     Inability: Never true    Transportation needs:     Medical: No     Non-medical: No   Tobacco Use    Smoking status: Current Every Day Smoker    Smokeless tobacco: Never Used   Substance and Sexual Activity    Alcohol use: Yes     Frequency: 2-4 times a month     Drinks per session: 3 or 4     Binge frequency: Monthly    Drug use: No    Sexual activity: Yes   Lifestyle     Physical activity:     Days per week: 0 days     Minutes per session: 10 min    Stress: Not at all   Relationships    Social connections:     Talks on phone: More than three times a week     Gets together: Once a week     Attends Zoroastrian service: Not on file     Active member of club or organization: No     Attends meetings of clubs or organizations: Never     Relationship status: Living with partner   Other Topics Concern    Not on file   Social History Narrative    Not on file       Objective:        Physical Exam   Constitutional: He is oriented to person, place, and time. He appears well-developed and well-nourished.   HENT:   Head: Normocephalic and atraumatic.   Right Ear: External ear normal.   Left Ear: External ear normal.   Eyes: EOM are normal.   Musculoskeletal: Normal range of motion.   Neurological: He is alert and oriented to person, place, and time.   Vitals reviewed.        Results for orders placed or performed in visit on 08/23/19   Hemoglobin A1c   Result Value Ref Range    Hemoglobin A1C 6.1 (H) 4.0 - 5.6 %    Estimated Avg Glucose 128 68 - 131 mg/dL   Comprehensive metabolic panel   Result Value Ref Range    Sodium 139 136 - 145 mmol/L    Potassium 4.3 3.5 - 5.1 mmol/L    Chloride 103 95 - 110 mmol/L    CO2 26 23 - 29 mmol/L    Glucose 86 70 - 110 mg/dL    BUN, Bld 15 6 - 20 mg/dL    Creatinine 1.1 0.5 - 1.4 mg/dL    Calcium 10.0 8.7 - 10.5 mg/dL    Total Protein 8.4 6.0 - 8.4 g/dL    Albumin 3.2 (L) 3.5 - 5.2 g/dL    Total Bilirubin 0.4 0.1 - 1.0 mg/dL    Alkaline Phosphatase 96 55 - 135 U/L    AST 20 10 - 40 U/L    ALT 29 10 - 44 U/L    Anion Gap 10 8 - 16 mmol/L    eGFR if African American >60.0 >60 mL/min/1.73 m^2    eGFR if non African American >60.0 >60 mL/min/1.73 m^2   Lipid panel   Result Value Ref Range    Cholesterol 224 (H) 120 - 199 mg/dL    Triglycerides 107 30 - 150 mg/dL    HDL 45 40 - 75 mg/dL    LDL Cholesterol 157.6 63.0 - 159.0 mg/dL    Hdl/Cholesterol Ratio 20.1  20.0 - 50.0 %    Total Cholesterol/HDL Ratio 5.0 2.0 - 5.0    Non-HDL Cholesterol 179 mg/dL       Assessment/Plan:     Lumbar radiculopathy  -     amitriptyline (ELAVIL) 50 MG tablet; Take 1 tablet (50 mg total) by mouth nightly as needed for Insomnia.  Dispense: 90 tablet; Refill: 1    BMI 50.0-59.9, adult  -     phentermine (ADIPEX-P) 37.5 mg tablet; Take 1 tablet (37.5 mg total) by mouth before breakfast.  Dispense: 30 tablet; Refill: 0  Continue drinking water  Small portions  Exercise     Released to return to work on next week   Follow up in 4 weeks     Follow up in about 4 weeks (around 11/8/2019).    Crista Rinaldi MD  [facility]   Family Medicine

## 2019-10-29 ENCOUNTER — TELEPHONE (OUTPATIENT)
Dept: PHYSICAL MEDICINE AND REHAB | Facility: CLINIC | Age: 44
End: 2019-10-29

## 2019-12-13 DIAGNOSIS — F51.01 PRIMARY INSOMNIA: ICD-10-CM

## 2019-12-16 RX ORDER — ZOLPIDEM TARTRATE 10 MG/1
TABLET ORAL
Qty: 30 TABLET | Refills: 0 | Status: SHIPPED | OUTPATIENT
Start: 2019-12-16 | End: 2023-02-20

## 2020-03-13 ENCOUNTER — OFFICE VISIT (OUTPATIENT)
Dept: INTERNAL MEDICINE | Facility: CLINIC | Age: 45
End: 2020-03-13
Payer: COMMERCIAL

## 2020-03-13 ENCOUNTER — PATIENT OUTREACH (OUTPATIENT)
Dept: ADMINISTRATIVE | Facility: OTHER | Age: 45
End: 2020-03-13

## 2020-03-13 ENCOUNTER — LAB VISIT (OUTPATIENT)
Dept: LAB | Facility: HOSPITAL | Age: 45
End: 2020-03-13
Attending: FAMILY MEDICINE
Payer: COMMERCIAL

## 2020-03-13 ENCOUNTER — PATIENT MESSAGE (OUTPATIENT)
Dept: INTERNAL MEDICINE | Facility: CLINIC | Age: 45
End: 2020-03-13

## 2020-03-13 DIAGNOSIS — R73.03 PREDIABETES: ICD-10-CM

## 2020-03-13 DIAGNOSIS — Z11.4 ENCOUNTER FOR SCREENING FOR HIV: ICD-10-CM

## 2020-03-13 DIAGNOSIS — R79.89 LOW TESTOSTERONE IN MALE: ICD-10-CM

## 2020-03-13 DIAGNOSIS — M72.2 BILATERAL PLANTAR FASCIITIS: ICD-10-CM

## 2020-03-13 DIAGNOSIS — M72.2 PLANTAR FASCIITIS, BILATERAL: ICD-10-CM

## 2020-03-13 DIAGNOSIS — E88.09 HYPOALBUMINEMIA: ICD-10-CM

## 2020-03-13 DIAGNOSIS — R79.89 LOW TESTOSTERONE IN MALE: Primary | ICD-10-CM

## 2020-03-13 LAB
ANION GAP SERPL CALC-SCNC: 10 MMOL/L (ref 8–16)
BUN SERPL-MCNC: 16 MG/DL (ref 6–20)
CALCIUM SERPL-MCNC: 9.8 MG/DL (ref 8.7–10.5)
CHLORIDE SERPL-SCNC: 103 MMOL/L (ref 95–110)
CO2 SERPL-SCNC: 26 MMOL/L (ref 23–29)
CREAT SERPL-MCNC: 1 MG/DL (ref 0.5–1.4)
EST. GFR  (AFRICAN AMERICAN): >60 ML/MIN/1.73 M^2
EST. GFR  (NON AFRICAN AMERICAN): >60 ML/MIN/1.73 M^2
GLUCOSE SERPL-MCNC: 90 MG/DL (ref 70–110)
POTASSIUM SERPL-SCNC: 4.2 MMOL/L (ref 3.5–5.1)
SODIUM SERPL-SCNC: 139 MMOL/L (ref 136–145)

## 2020-03-13 PROCEDURE — 99999 PR PBB SHADOW E&M-EST. PATIENT-LVL III: CPT | Mod: PBBFAC,,, | Performed by: FAMILY MEDICINE

## 2020-03-13 PROCEDURE — 86703 HIV-1/HIV-2 1 RESULT ANTBDY: CPT

## 2020-03-13 PROCEDURE — 99999 PR PBB SHADOW E&M-EST. PATIENT-LVL III: ICD-10-PCS | Mod: PBBFAC,,, | Performed by: FAMILY MEDICINE

## 2020-03-13 PROCEDURE — 84403 ASSAY OF TOTAL TESTOSTERONE: CPT

## 2020-03-13 PROCEDURE — 80048 BASIC METABOLIC PNL TOTAL CA: CPT

## 2020-03-13 PROCEDURE — 96372 THER/PROPH/DIAG INJ SC/IM: CPT | Mod: S$GLB,,, | Performed by: FAMILY MEDICINE

## 2020-03-13 PROCEDURE — 83036 HEMOGLOBIN GLYCOSYLATED A1C: CPT

## 2020-03-13 PROCEDURE — 99214 PR OFFICE/OUTPT VISIT, EST, LEVL IV, 30-39 MIN: ICD-10-PCS | Mod: 25,S$GLB,, | Performed by: FAMILY MEDICINE

## 2020-03-13 PROCEDURE — 99214 OFFICE O/P EST MOD 30 MIN: CPT | Mod: 25,S$GLB,, | Performed by: FAMILY MEDICINE

## 2020-03-13 PROCEDURE — 96372 PR INJECTION,THERAP/PROPH/DIAG2ST, IM OR SUBCUT: ICD-10-PCS | Mod: S$GLB,,, | Performed by: FAMILY MEDICINE

## 2020-03-13 PROCEDURE — 36415 COLL VENOUS BLD VENIPUNCTURE: CPT

## 2020-03-13 RX ORDER — TIZANIDINE 4 MG/1
4 TABLET ORAL EVERY 6 HOURS PRN
Qty: 30 TABLET | Refills: 0 | Status: SHIPPED | OUTPATIENT
Start: 2020-03-13 | End: 2020-04-14

## 2020-03-13 RX ORDER — KETOROLAC TROMETHAMINE 30 MG/ML
60 INJECTION, SOLUTION INTRAMUSCULAR; INTRAVENOUS ONCE
Status: COMPLETED | OUTPATIENT
Start: 2020-03-13 | End: 2020-03-13

## 2020-03-13 RX ORDER — IBUPROFEN 800 MG/1
800 TABLET ORAL 3 TIMES DAILY
Qty: 60 TABLET | Refills: 1 | Status: SHIPPED | OUTPATIENT
Start: 2020-03-13 | End: 2023-02-20

## 2020-03-13 RX ADMIN — KETOROLAC TROMETHAMINE 60 MG: 30 INJECTION, SOLUTION INTRAMUSCULAR; INTRAVENOUS at 02:03

## 2020-03-13 NOTE — PROGRESS NOTES
Dylan Ariza  44 y.o. Black or  male    Here for follow up on testosterone and plantar fasciitis.     March Testosterone panel low  We started injections every 2 weeks.   May we stopped and he noted he stopped having headaches.   He didn't feel any difference/improvement with taking the injection.     Bilateral feet pain. Seen last by Podiatry in September.   Got injection at a time he was not having significant pain  Injection did not hurt    3-4 weeks pain returned.   Changed insoles  Tennis ball helps   Taking ibuprofen as well     Today pain is 5/10   Walking he feels the pain       Right arm pain periodically   Throbbing pain  Notes more when he is laying down  Lasting for a couple hours     Reports taking medications as instructed.  Not taking any OTC meds.        Answers for HPI/ROS submitted by the patient on 3/13/2020   activity change: No  unexpected weight change: No  neck pain: No  hearing loss: No  rhinorrhea: No  trouble swallowing: No  eye discharge: No  visual disturbance: No  chest tightness: No  wheezing: No  chest pain: No  palpitations: No  blood in stool: No  constipation: No  vomiting: No  diarrhea: No  polydipsia: No  polyuria: No  difficulty urinating: No  urgency: No  hematuria: No  joint swelling: No  arthralgias: No  headaches: No  weakness: No  confusion: No  dysphoric mood: No    Past Medical History:   Diagnosis Date    Mixed hyperlipidemia 12/13/2018         Current Outpatient Medications:     amitriptyline (ELAVIL) 50 MG tablet, Take 1 tablet (50 mg total) by mouth nightly as needed for Insomnia., Disp: 90 tablet, Rfl: 1    cyanocobalamin (VITAMIN B-12) 100 MCG tablet, Take by mouth daily as needed., Disp: , Rfl:     fluticasone propionate (CHILDREN'S FLONASE ALLERGY RLF) 50 mcg/actuation nasal spray, 1 spray (50 mcg total) by Each Nostril route once daily., Disp: 1 Bottle, Rfl: 0    ibuprofen (ADVIL,MOTRIN) 800 MG tablet, Take 1 tablet (800 mg total) by  mouth 3 (three) times daily., Disp: 60 tablet, Rfl: 1    pantoprazole (PROTONIX) 40 MG tablet, Take 1 tablet (40 mg total) by mouth once daily., Disp: 90 tablet, Rfl: 1    zolpidem (AMBIEN) 10 mg Tab, TAKE 1 TABLET(10 MG) BY MOUTH EVERY NIGHT AS NEEDED, Disp: 30 tablet, Rfl: 0    MULTIVITAMIN ORAL, Take by mouth daily as needed., Disp: , Rfl:     traMADol (ULTRAM) 50 mg tablet, Take 1 tablet (50 mg total) by mouth every 6 (six) hours as needed for Pain., Disp: 30 tablet, Rfl: 0    Review of patient's allergies indicates:  No Known Allergies    ROS: Denies dizziness, headache, chest pain, shortness of breath or edema    PE:  GEN: Alert and oriented, no acute distress  HEART: Normal S1 and S2, RRR, no lower extremity edema  LUNGS: Respirations unlabored, bilaterally clear to auscultation    ASSESSMENT/PLAN:  Low testosterone in male  -     TESTOSTERONE PANEL; Future; Expected date: 03/13/2020  Stopped testosterone for a while after normal levels.   He did not feel much difference with the injections and got headaches.   Rechecking levels  Discussed androgel    Bilateral plantar fasciitis  -     ketorolac injection 60 mg    Plantar fasciitis, bilateral  -     ibuprofen (ADVIL,MOTRIN) 800 MG tablet; Take 1 tablet (800 mg total) by mouth 3 (three) times daily.  Dispense: 60 tablet; Refill: 1    Prediabetes  -     Hemoglobin A1c; Future; Expected date: 03/13/2020  -     Basic metabolic panel; Future; Expected date: 03/13/2020    Hypoalbuminemia  -     Basic metabolic panel; Future; Expected date: 03/13/2020    Encounter for screening for HIV  -     HIV 1/2 Ag/Ab (4th Gen); Future; Expected date: 03/13/2020    Other orders  -     tiZANidine (ZANAFLEX) 4 MG tablet; Take 1 tablet (4 mg total) by mouth every 6 (six) hours as needed.  Dispense: 30 tablet; Refill: 0        Follow up as needed

## 2020-03-14 VITALS
WEIGHT: 315 LBS | DIASTOLIC BLOOD PRESSURE: 89 MMHG | OXYGEN SATURATION: 94 % | SYSTOLIC BLOOD PRESSURE: 138 MMHG | BODY MASS INDEX: 51.24 KG/M2 | TEMPERATURE: 98 F | HEART RATE: 114 BPM

## 2020-03-14 LAB
ESTIMATED AVG GLUCOSE: 123 MG/DL (ref 68–131)
HBA1C MFR BLD HPLC: 5.9 % (ref 4–5.6)

## 2020-03-16 ENCOUNTER — PATIENT MESSAGE (OUTPATIENT)
Dept: ADMINISTRATIVE | Facility: OTHER | Age: 45
End: 2020-03-16

## 2020-03-16 ENCOUNTER — OFFICE VISIT (OUTPATIENT)
Dept: PODIATRY | Facility: CLINIC | Age: 45
End: 2020-03-16
Payer: COMMERCIAL

## 2020-03-16 VITALS
WEIGHT: 315 LBS | DIASTOLIC BLOOD PRESSURE: 96 MMHG | HEIGHT: 67 IN | HEART RATE: 115 BPM | SYSTOLIC BLOOD PRESSURE: 134 MMHG | BODY MASS INDEX: 49.44 KG/M2

## 2020-03-16 DIAGNOSIS — M79.672 PAIN IN LEFT FOOT: ICD-10-CM

## 2020-03-16 DIAGNOSIS — E66.01 MORBID OBESITY: ICD-10-CM

## 2020-03-16 DIAGNOSIS — M79.671 PAIN IN RIGHT FOOT: ICD-10-CM

## 2020-03-16 DIAGNOSIS — M72.2 PLANTAR FASCIITIS: Primary | ICD-10-CM

## 2020-03-16 DIAGNOSIS — M24.572 CONTRACTURE, LEFT ANKLE: ICD-10-CM

## 2020-03-16 DIAGNOSIS — M24.571 CONTRACTURE, RIGHT ANKLE: ICD-10-CM

## 2020-03-16 DIAGNOSIS — M21.41 PES PLANUS OF BOTH FEET: ICD-10-CM

## 2020-03-16 DIAGNOSIS — M21.42 PES PLANUS OF BOTH FEET: ICD-10-CM

## 2020-03-16 LAB — HIV 1+2 AB+HIV1 P24 AG SERPL QL IA: NEGATIVE

## 2020-03-16 PROCEDURE — 20550 PR INJECT TENDON SHEATH/LIGAMENT: ICD-10-PCS | Mod: 50,S$GLB,, | Performed by: PODIATRIST

## 2020-03-16 PROCEDURE — 99213 PR OFFICE/OUTPT VISIT, EST, LEVL III, 20-29 MIN: ICD-10-PCS | Mod: 25,S$GLB,, | Performed by: PODIATRIST

## 2020-03-16 PROCEDURE — 99999 PR PBB SHADOW E&M-EST. PATIENT-LVL III: ICD-10-PCS | Mod: PBBFAC,,, | Performed by: PODIATRIST

## 2020-03-16 PROCEDURE — 20550 NJX 1 TENDON SHEATH/LIGAMENT: CPT | Mod: 50,S$GLB,, | Performed by: PODIATRIST

## 2020-03-16 PROCEDURE — 99213 OFFICE O/P EST LOW 20 MIN: CPT | Mod: 25,S$GLB,, | Performed by: PODIATRIST

## 2020-03-16 PROCEDURE — 99999 PR PBB SHADOW E&M-EST. PATIENT-LVL III: CPT | Mod: PBBFAC,,, | Performed by: PODIATRIST

## 2020-03-16 RX ORDER — DEXAMETHASONE SODIUM PHOSPHATE 4 MG/ML
4 INJECTION, SOLUTION INTRA-ARTICULAR; INTRALESIONAL; INTRAMUSCULAR; INTRAVENOUS; SOFT TISSUE ONCE
Status: COMPLETED | OUTPATIENT
Start: 2020-03-16 | End: 2020-03-16

## 2020-03-16 RX ORDER — METHYLPREDNISOLONE ACETATE 40 MG/ML
40 INJECTION, SUSPENSION INTRA-ARTICULAR; INTRALESIONAL; INTRAMUSCULAR; SOFT TISSUE
Status: COMPLETED | OUTPATIENT
Start: 2020-03-16 | End: 2020-03-16

## 2020-03-16 RX ADMIN — DEXAMETHASONE SODIUM PHOSPHATE 4 MG: 4 INJECTION, SOLUTION INTRA-ARTICULAR; INTRALESIONAL; INTRAMUSCULAR; INTRAVENOUS; SOFT TISSUE at 12:03

## 2020-03-16 RX ADMIN — METHYLPREDNISOLONE ACETATE 40 MG: 40 INJECTION, SUSPENSION INTRA-ARTICULAR; INTRALESIONAL; INTRAMUSCULAR; SOFT TISSUE at 12:03

## 2020-03-16 NOTE — PROGRESS NOTES
Subjective:       Patient ID: Dylan Ariza is a 44 y.o. male.    Chief Complaint: Plantar Fasciitis (B/l rates pain 5/10 tennis w/socks non diabetic pt pcp Dr. Rinaldi.)      HPI: Dylan Ariza presents to the office today, for follow-up concerning plantar fasciitis of the left and right foot. At this time, pains are 8/10 and are described as moderate in nature. States analgic gait pattern. States walking and standing is very painful. To this juncture, patient has had 1 cortisone injections to the left and right foot. States infrequent NSAIDs. Patient's Primary Care Provider is Crista Rinaldi MD.     Review of patient's allergies indicates:  No Known Allergies    Past Medical History:   Diagnosis Date    Mixed hyperlipidemia 12/13/2018       Family History   Problem Relation Age of Onset    Heart disease Father     Hypertension Father     Cancer Paternal Grandmother     Diabetes Neg Hx        Social History     Socioeconomic History    Marital status: Single     Spouse name: Not on file    Number of children: Not on file    Years of education: Not on file    Highest education level: Not on file   Occupational History    Not on file   Social Needs    Financial resource strain: Somewhat hard    Food insecurity:     Worry: Never true     Inability: Never true    Transportation needs:     Medical: No     Non-medical: No   Tobacco Use    Smoking status: Current Every Day Smoker    Smokeless tobacco: Never Used   Substance and Sexual Activity    Alcohol use: Yes     Frequency: 2-4 times a month     Drinks per session: 3 or 4     Binge frequency: Monthly    Drug use: No    Sexual activity: Yes   Lifestyle    Physical activity:     Days per week: 0 days     Minutes per session: 10 min    Stress: Not at all   Relationships    Social connections:     Talks on phone: More than three times a week     Gets together: Once a week     Attends Sabianist service: Not on file     Active member of  "club or organization: No     Attends meetings of clubs or organizations: Never     Relationship status: Living with partner   Other Topics Concern    Not on file   Social History Narrative    Not on file       Past Surgical History:   Procedure Laterality Date    SLEEVE GASTROPLASTY         Review of Systems   Constitutional: Negative for chills, fatigue and fever.   HENT: Negative for hearing loss.    Eyes: Negative for photophobia and visual disturbance.   Respiratory: Negative for cough, chest tightness, shortness of breath and wheezing.    Cardiovascular: Negative for chest pain and palpitations.   Gastrointestinal: Negative for constipation, diarrhea, nausea and vomiting.   Endocrine: Negative for cold intolerance and heat intolerance.   Genitourinary: Negative for flank pain.   Musculoskeletal: Positive for gait problem. Negative for neck pain and neck stiffness.   Skin: Negative for wound.   Neurological: Negative for light-headedness and headaches.   Psychiatric/Behavioral: Negative for sleep disturbance.         Objective:   BP (!) 134/96   Pulse (!) 115   Ht 5' 7" (1.702 m)   Wt (!) 147.7 kg (325 lb 11.7 oz)   BMI 51.02 kg/m²       Physical Exam    LOWER EXTREMITY PHYSICAL EXAMINATION    NEUROLOGY: Negative Tinel's Sign and negative Valleix Sign. No neurological sensations with compression of the area of Flowers's Nerve in the area of the Abductor Hallucis muscle belly.    ORTHOPEDIC: Severe tenderness to palpation of the area around the plantar medial calcaneal tubercle on the left and right foot. Pains are characterized as sharp and stabbing-like with direct palpation of the area. There is also moderate pain to palpation of the immediate plantar aspect of the heel, and mild pains to the lateral band of the fascia. No edema is noted. No fullness is noted. No pains or defects are noted within the plantar fascia at the arch. No plantar fibromas are noted. No defects are noted within the ligament. No " pains with medial to lateral compression of the heel. Equinus contracture is noted.  Pes planus foot type is noted.  Antalgic gait pattern is noted.     DERMATOLOGY: No ecchymosis is noted.  Skin is supple, dry and intact. Skin is supple.  No hyperkeratosis noted. No calluses.  No open wounds or ulcerations are noted.  No palpable plantar fibromas noted.    VASCULAR: On the left and right foot, the dorsalis pedis pulse is 2/4 and the posterior tibial pulse is 2/4. Capillary refill time is less than 3 seconds. Hair growth is present on the dorsum of the foot and at the digits. No rubor is present. Proximal to distal temperature is warm to warm.    Assessment:     1. Plantar fasciitis    2. Pain in right foot    3. Pain in left foot    4. Contracture, right ankle    5. Contracture, left ankle    6. Pes planus of both feet    7. Morbid obesity          Plan:     Plantar fasciitis  Pain in right foot  Pain in left foot  -     methylPREDNISolone acetate injection 40 mg  -     dexamethasone injection 4 mg    Following sterile preparation with alcohol swab and/or betadine paint, injection was given into and around the area of the left and right plantar medial calcaneal tubercle, using 25-gauge needle. Injection consisted of approximately 0.5cc of Dexamethasone Sodium Phosphate, 0.5cc of Depo-Medrol (40mg/dL) and 0.5cc of 1% Lidocaine w/ or w/o Epinephrine or 0.25% or 0.50% Marcaine plain. Patient tolerated procedure well, and without complications or complaints. Patient educated that the area of pathology, might actually be slightly more painful, due to the injection, over the course of the next one to two days.    Patient will continue oral NSAID.     Contracture, right ankle  Contracture, left ankle  Stretching exercises are discussed, taught, and are demonstrates to the patient this afternoon due to concomitant diagnosis of equinus contracture. The relationship between equinus contracture and the other aforementioned  pathologies are detailed and outlined to the patient. The patient does acknowledge understanding, and we will embark on a vigorous stretching algorithm for the lower extremity.    Pes planus of both feet  Did discuss proper and supportive shoe gear in detail and at length with the patient.  These are shoes with firm and robust arch support; medial counter.  Shoes which only bend at the metatarsophalangeal joint and which are rigid in the midfoot and hindfoot. Patient urged to purchase running type or cross training type shoes gear which are designed for pronation control.     Morbid obesity  Patient is counseled and reminded concerning the importance of good nutrition and healthy eating habits, which may include blood sugar control, to prevent and/or help podiatric foot and ankle complications.             Future Appointments   Date Time Provider Department Center   4/16/2020  8:30 AM Allan Wright DPM [facility]

## 2020-03-19 LAB
ALBUMIN SERPL-MCNC: 3.6 G/DL (ref 3.6–5.1)
SHBG SERPL-SCNC: 14 NMOL/L (ref 10–50)
TESTOST FREE SERPL-MCNC: 12.2 PG/ML (ref 46–224)
TESTOST SERPL-MCNC: 53 NG/DL (ref 250–1100)
TESTOSTERONE.FREE+WB SERPL-MCNC: 20.4 NG/DL (ref 110–575)

## 2020-03-24 ENCOUNTER — PATIENT MESSAGE (OUTPATIENT)
Dept: INTERNAL MEDICINE | Facility: CLINIC | Age: 45
End: 2020-03-24

## 2020-03-25 ENCOUNTER — OFFICE VISIT (OUTPATIENT)
Dept: INTERNAL MEDICINE | Facility: CLINIC | Age: 45
End: 2020-03-25
Payer: COMMERCIAL

## 2020-03-25 ENCOUNTER — PATIENT MESSAGE (OUTPATIENT)
Dept: INTERNAL MEDICINE | Facility: CLINIC | Age: 45
End: 2020-03-25

## 2020-03-25 DIAGNOSIS — R05.9 COUGH: Primary | ICD-10-CM

## 2020-03-25 PROCEDURE — 99213 OFFICE O/P EST LOW 20 MIN: CPT | Mod: 95,,, | Performed by: FAMILY MEDICINE

## 2020-03-25 PROCEDURE — 99213 PR OFFICE/OUTPT VISIT, EST, LEVL III, 20-29 MIN: ICD-10-PCS | Mod: 95,,, | Performed by: FAMILY MEDICINE

## 2020-03-25 RX ORDER — BENZONATATE 100 MG/1
100 CAPSULE ORAL 3 TIMES DAILY PRN
Qty: 30 CAPSULE | Refills: 0 | Status: SHIPPED | OUTPATIENT
Start: 2020-03-25 | End: 2020-04-04

## 2020-03-25 NOTE — PROGRESS NOTES
The patient location is: [place] (Work)   The chief complaint leading to consultation is: URI   Visit type: Virtual visit with synchronous audio and video  Time [id number]  Time end 114  Total time spent with patient: 9 minutes   Each patient to whom he or she provides medical services by telemedicine is:  (1) informed of the relationship between the physician and patient and the respective role of any other health care provider with respect to management of the patient; and (2) notified that he or she may decline to receive medical services by telemedicine and may withdraw from such care at any time.    Subjective:       Patient ID: Dylan Ariza is a 45 y.o. male.    Chief Complaint: No chief complaint on file.    HPI Mr. Ariza presents today via telemedicine for cough a week  Fatigue   Feels warm   Cough is productive     Feels like mucus is stuck in throat     Mucinex has helped a little   Cough was bothering his sleep but mucinex helped.   Has been using flonase     Review of Systems   Constitutional: Positive for fatigue and fever.   HENT: Negative.    Respiratory: Positive for cough.    Gastrointestinal: Negative.    Musculoskeletal: Negative.    Neurological: Negative.          Past Medical History:   Diagnosis Date    Mixed hyperlipidemia 12/13/2018     Past Surgical History:   Procedure Laterality Date    SLEEVE GASTROPLASTY         Objective:        Physical Exam   Constitutional: He is oriented to person, place, and time. He appears well-developed and well-nourished.   HENT:   Head: Normocephalic and atraumatic.   Neurological: He is alert and oriented to person, place, and time.   Vitals reviewed.        Assessment/Plan:     Cough  -     benzonatate (TESSALON) 100 MG capsule; Take 1 capsule (100 mg total) by mouth 3 (three) times daily as needed for Cough.  Dispense: 30 capsule; Refill: 0          Follow up as needed     Crista Rinaldi MD  [facility]   Family Medicine

## 2020-04-07 ENCOUNTER — PATIENT MESSAGE (OUTPATIENT)
Dept: PODIATRY | Facility: CLINIC | Age: 45
End: 2020-04-07

## 2020-04-14 ENCOUNTER — PATIENT MESSAGE (OUTPATIENT)
Dept: INTERNAL MEDICINE | Facility: CLINIC | Age: 45
End: 2020-04-14

## 2020-04-14 RX ORDER — TIZANIDINE 4 MG/1
TABLET ORAL
Qty: 30 TABLET | Refills: 0 | Status: SHIPPED | OUTPATIENT
Start: 2020-04-14 | End: 2023-02-20

## 2020-04-23 DIAGNOSIS — M54.16 LUMBAR RADICULOPATHY: ICD-10-CM

## 2020-04-23 RX ORDER — AMITRIPTYLINE HYDROCHLORIDE 50 MG/1
TABLET, FILM COATED ORAL
Qty: 90 TABLET | Refills: 1 | Status: SHIPPED | OUTPATIENT
Start: 2020-04-23 | End: 2023-02-20

## 2020-05-27 ENCOUNTER — PATIENT MESSAGE (OUTPATIENT)
Dept: GASTROENTEROLOGY | Facility: CLINIC | Age: 45
End: 2020-05-27

## 2020-05-27 ENCOUNTER — PATIENT OUTREACH (OUTPATIENT)
Dept: ADMINISTRATIVE | Facility: OTHER | Age: 45
End: 2020-05-27

## 2020-05-27 ENCOUNTER — OFFICE VISIT (OUTPATIENT)
Dept: GASTROENTEROLOGY | Facility: CLINIC | Age: 45
End: 2020-05-27
Payer: COMMERCIAL

## 2020-05-27 VITALS
DIASTOLIC BLOOD PRESSURE: 80 MMHG | HEART RATE: 76 BPM | BODY MASS INDEX: 49.44 KG/M2 | HEIGHT: 67 IN | WEIGHT: 315 LBS | SYSTOLIC BLOOD PRESSURE: 130 MMHG

## 2020-05-27 DIAGNOSIS — R05.3 CHRONIC COUGH: ICD-10-CM

## 2020-05-27 DIAGNOSIS — K21.9 GASTROESOPHAGEAL REFLUX DISEASE, ESOPHAGITIS PRESENCE NOT SPECIFIED: Primary | ICD-10-CM

## 2020-05-27 DIAGNOSIS — R10.13 EPIGASTRIC PAIN: ICD-10-CM

## 2020-05-27 DIAGNOSIS — Z12.11 COLON CANCER SCREENING: ICD-10-CM

## 2020-05-27 DIAGNOSIS — Z86.19 HISTORY OF HELICOBACTER PYLORI INFECTION: ICD-10-CM

## 2020-05-27 DIAGNOSIS — R09.A2 GLOBUS SENSATION: ICD-10-CM

## 2020-05-27 PROBLEM — G89.29 CHRONIC LOWER BACK PAIN: Status: ACTIVE | Noted: 2020-05-27

## 2020-05-27 PROBLEM — R12 HEARTBURN: Status: ACTIVE | Noted: 2020-05-27

## 2020-05-27 PROBLEM — M54.50 CHRONIC LOWER BACK PAIN: Status: ACTIVE | Noted: 2020-05-27

## 2020-05-27 PROCEDURE — 99204 PR OFFICE/OUTPT VISIT, NEW, LEVL IV, 45-59 MIN: ICD-10-PCS | Mod: S$GLB,,, | Performed by: INTERNAL MEDICINE

## 2020-05-27 PROCEDURE — 99999 PR PBB SHADOW E&M-EST. PATIENT-LVL III: CPT | Mod: PBBFAC,,, | Performed by: INTERNAL MEDICINE

## 2020-05-27 PROCEDURE — 99204 OFFICE O/P NEW MOD 45 MIN: CPT | Mod: S$GLB,,, | Performed by: INTERNAL MEDICINE

## 2020-05-27 PROCEDURE — 99999 PR PBB SHADOW E&M-EST. PATIENT-LVL III: ICD-10-PCS | Mod: PBBFAC,,, | Performed by: INTERNAL MEDICINE

## 2020-05-27 RX ORDER — SODIUM, POTASSIUM,MAG SULFATES 17.5-3.13G
SOLUTION, RECONSTITUTED, ORAL ORAL
Qty: 254 ML | Refills: 0 | Status: SHIPPED | OUTPATIENT
Start: 2020-05-27 | End: 2023-02-20

## 2020-05-27 NOTE — PROGRESS NOTES
Subjective:       Patient ID: Dylan Ariza is a 45 y.o. male.    Chief Complaint: Emesis and Advice Only    The patient is here with complaint of chronic cough for ~ 3 years. He has been told it was a problem with his sinuses and most recently has tried Flonas. He has also been placed on Protonix for H pylori in the past but has also had issues with GERD. He is S/P Gastric Sleve Surgery done 7 years ago. He last weight initially, then gained it back. He feels hie exacerbation of reflux occured at that time.     He also has issues with epigastric pain from time to time. Feels like a knot in his epigastrium. There is also vomiting which occurs with severe bouts of his cough. There is no anorexia or weight loss. There has been no melena; however, he has seen [facility] about a month ago. It has not recurred. He has not a colonoscopy before.    Review of Systems   Constitutional: Negative.  Negative for activity change, appetite change, chills, diaphoresis, fatigue, fever and unexpected weight change.   HENT: Negative for congestion, ear discharge, facial swelling, hearing loss, nosebleeds, postnasal drip, sinus pressure, sneezing, tinnitus, trouble swallowing and voice change.    Eyes: Negative for photophobia, redness and visual disturbance.   Respiratory: Negative for cough, chest tightness, shortness of breath and wheezing.    Cardiovascular: Negative for chest pain and palpitations.   Gastrointestinal: Negative for abdominal distention, abdominal pain, blood in stool, constipation, diarrhea, nausea, rectal pain and vomiting.   Genitourinary: Negative for difficulty urinating, discharge, dysuria, flank pain, frequency, hematuria, scrotal swelling, testicular pain and urgency.   Musculoskeletal: Negative for arthralgias, back pain, gait problem, joint swelling, myalgias and neck stiffness.   Skin: Negative for color change, pallor, rash and wound.   Neurological: Negative for dizziness, tremors, seizures,  syncope, facial asymmetry, speech difficulty, weakness, light-headedness, numbness and headaches.   Hematological: Negative for adenopathy. Does not bruise/bleed easily.   Psychiatric/Behavioral: Negative for agitation, confusion, hallucinations, sleep disturbance and suicidal ideas.       Objective:      Physical Exam   Constitutional: He is oriented to person, place, and time. He appears well-developed and well-nourished. No distress.   Morbid Obesity   HENT:   Head: Normocephalic and atraumatic.   Nose: Nose normal.   Mouth/Throat: Oropharynx is clear and moist. No oropharyngeal exudate.   Eyes: Pupils are equal, round, and reactive to light. Conjunctivae are normal. No scleral icterus.   Neck: Normal range of motion. Neck supple. No thyromegaly present.   Cardiovascular: Normal rate and regular rhythm. Exam reveals no gallop and no friction rub.   No murmur heard.  Pulmonary/Chest: Effort normal and breath sounds normal. No respiratory distress. He has no wheezes. He has no rales.   Abdominal: Soft. Bowel sounds are normal. He exhibits no distension and no mass. There is no tenderness. There is no rebound and no guarding.   Musculoskeletal: He exhibits no edema or tenderness.   Lymphadenopathy:     He has no cervical adenopathy.   Neurological: He is alert and oriented to person, place, and time. He exhibits normal muscle tone. Coordination normal.   Skin: Skin is warm. No rash noted. He is not diaphoretic.   Psychiatric: He has a normal mood and affect. His behavior is normal. Judgment and thought content normal.   Vitals reviewed.      Assessment:   GERD  Chronic Cough  Epigastric Abdominal Pain         PE unremarkable  Mordid Obesity  Globus  Colon Cancer screen  Plan:   EGD  BRAVO  Colonoscopy

## 2020-05-28 ENCOUNTER — TELEPHONE (OUTPATIENT)
Dept: UROLOGY | Facility: CLINIC | Age: 45
End: 2020-05-28

## 2020-05-28 ENCOUNTER — OFFICE VISIT (OUTPATIENT)
Dept: UROLOGY | Facility: CLINIC | Age: 45
End: 2020-05-28
Payer: COMMERCIAL

## 2020-05-28 VITALS
BODY MASS INDEX: 49.44 KG/M2 | DIASTOLIC BLOOD PRESSURE: 82 MMHG | TEMPERATURE: 98 F | HEIGHT: 67 IN | WEIGHT: 315 LBS | SYSTOLIC BLOOD PRESSURE: 134 MMHG

## 2020-05-28 DIAGNOSIS — N52.9 ERECTILE DYSFUNCTION, UNSPECIFIED ERECTILE DYSFUNCTION TYPE: Primary | ICD-10-CM

## 2020-05-28 DIAGNOSIS — E66.01 MORBID OBESITY: ICD-10-CM

## 2020-05-28 DIAGNOSIS — E29.1 HYPOGONADISM IN MALE: ICD-10-CM

## 2020-05-28 DIAGNOSIS — Z12.5 PROSTATE CANCER SCREENING: ICD-10-CM

## 2020-05-28 PROCEDURE — 99999 PR PBB SHADOW E&M-EST. PATIENT-LVL III: CPT | Mod: PBBFAC,,, | Performed by: UROLOGY

## 2020-05-28 PROCEDURE — 99999 PR PBB SHADOW E&M-EST. PATIENT-LVL III: ICD-10-PCS | Mod: PBBFAC,,, | Performed by: UROLOGY

## 2020-05-28 PROCEDURE — 99204 PR OFFICE/OUTPT VISIT, NEW, LEVL IV, 45-59 MIN: ICD-10-PCS | Mod: S$GLB,,, | Performed by: UROLOGY

## 2020-05-28 PROCEDURE — 99204 OFFICE O/P NEW MOD 45 MIN: CPT | Mod: S$GLB,,, | Performed by: UROLOGY

## 2020-05-28 RX ORDER — SILDENAFIL 100 MG/1
100 TABLET, FILM COATED ORAL DAILY PRN
Qty: 30 TABLET | Refills: 11 | Status: SHIPPED | OUTPATIENT
Start: 2020-05-28 | End: 2023-02-20

## 2020-05-28 NOTE — PROGRESS NOTES
Chief Complaint: ED    HPI:   5/28/20: 44 yo man here to talk about ED.  Doesn't last more than a few minutes and can't finish and sometimes can't start.  Has tried T shots in the past.  Tried viagra once and was redirected away from it.  Epigastric pain sometimes and sometimes real bad.  No current abd/pelvic pain and no exac/rel factors.  No hematuria.  No urolithiasis.  No urinary bother.  No  history.  No family CaP history.  T is very low.  Wants to protect fertility.    Allergies:  Patient has no known allergies.    Medications:  has a current medication list which includes the following prescription(s): amitriptyline, cyanocobalamin, fluticasone propionate, ibuprofen, pantoprazole, tizanidine, sodium,potassium,mag sulfates, and zolpidem.    Review of Systems:  General: No fever, chills, fatigability, or weight loss.  Skin: No rashes, itching, or changes in color or texture of skin.  Chest: Denies SIU, cyanosis, wheezing, cough, and sputum production.  Abdomen: Appetite fine. No weight loss. Denies diarrhea, abdominal pain, hematemesis, or blood in stool.  Musculoskeletal: No joint stiffness or swelling. Denies back pain.  : As above.  All other review of systems negative.    PMH:   has a past medical history of Chronic back pain, Chronic cough, GERD (gastroesophageal reflux disease), Globus sensation, Insulin resistance, Mixed hyperlipidemia (12/13/2018), and Testosterone deficiency.    PSH:   has a past surgical history that includes Sleeve Gastroplasty.    FamHx: family history includes Cancer in his paternal grandmother; Heart disease in his father; Heart failure in his father; Hypertension in his father; Schizophrenia in his mother; Stroke in his father.    SocHx:  reports that he quit smoking about 2 months ago. His smoking use included cigars. He quit after 20.00 years of use. He has never used smokeless tobacco. He reports that he drinks alcohol. He reports that he does not use drugs.      Physical  Exam:  Vitals:    05/28/20 1602   BP: 134/82   Temp: 98.4 °F (36.9 °C)     General: A&Ox3, no apparent distress, no deformities  Neck: No masses, normal thyroid  Lungs: normal inspiration, no use of accessory muscles  Heart: normal pulse, no arrhythmias  Abdomen: Soft, NT, ND, no masses, no hernias, no hepatosplenomegaly  Lymphatic: Neck and groin nodes negative  Skin: The skin is warm and dry. No jaundice.  Ext: No c/c/e.  : Test desc monisha, no abnormalities of epididymus. Penis normal, with normal penile and scrotal skin. Meatus normal.     Labs/Studies:   PSA    3/19: 0.36    Impression/Plan:   1. Obesity: driving factor in problems  2. Low T: profound; needs full panel  3. Low risk of prostate cancer  4. ED: sildenafil  5. Dietician referral  6. Semen sample

## 2020-05-28 NOTE — TELEPHONE ENCOUNTER
Dr. Connors would like pt to see Dietician for Obesity. Can you please call patient and schedule?    Thank You so much,    Didi Porter RN

## 2020-05-29 ENCOUNTER — LAB VISIT (OUTPATIENT)
Dept: LAB | Facility: HOSPITAL | Age: 45
End: 2020-05-29
Attending: UROLOGY
Payer: COMMERCIAL

## 2020-05-29 ENCOUNTER — TELEPHONE (OUTPATIENT)
Dept: SURGERY | Facility: CLINIC | Age: 45
End: 2020-05-29

## 2020-05-29 DIAGNOSIS — N52.9 ERECTILE DYSFUNCTION, UNSPECIFIED ERECTILE DYSFUNCTION TYPE: ICD-10-CM

## 2020-05-29 DIAGNOSIS — Z12.5 PROSTATE CANCER SCREENING: ICD-10-CM

## 2020-05-29 DIAGNOSIS — E66.01 MORBID OBESITY: ICD-10-CM

## 2020-05-29 DIAGNOSIS — E29.1 HYPOGONADISM IN MALE: ICD-10-CM

## 2020-05-29 LAB
FSH SERPL-ACNC: 4.8 MIU/ML (ref 0.95–11.95)
LH SERPL-ACNC: 1.9 MIU/ML (ref 0.6–12.1)
PROLACTIN SERPL IA-MCNC: 880.1 NG/ML (ref 3.5–19.4)

## 2020-05-29 PROCEDURE — 84146 ASSAY OF PROLACTIN: CPT

## 2020-05-29 PROCEDURE — 83001 ASSAY OF GONADOTROPIN (FSH): CPT

## 2020-05-29 PROCEDURE — 83002 ASSAY OF GONADOTROPIN (LH): CPT

## 2020-05-29 PROCEDURE — 82040 ASSAY OF SERUM ALBUMIN: CPT

## 2020-05-29 NOTE — TELEPHONE ENCOUNTER
Spoke to patient, he is aware of his appointment date and time, with Mary Torres, the patient voiced understanding.

## 2020-06-01 ENCOUNTER — TELEPHONE (OUTPATIENT)
Dept: UROLOGY | Facility: CLINIC | Age: 45
End: 2020-06-01

## 2020-06-01 DIAGNOSIS — R79.89 ELEVATED PROLACTIN LEVEL: Primary | ICD-10-CM

## 2020-06-02 ENCOUNTER — TELEPHONE (OUTPATIENT)
Dept: UROLOGY | Facility: CLINIC | Age: 45
End: 2020-06-02

## 2020-06-02 NOTE — TELEPHONE ENCOUNTER
Contacted pt and informed him of high prolactin level and the need for an MRI of his head per Dr. Connors; pt verbalized understanding and appts scheduled.

## 2020-06-04 ENCOUNTER — TELEPHONE (OUTPATIENT)
Dept: ENDOSCOPY | Facility: HOSPITAL | Age: 45
End: 2020-06-04

## 2020-06-04 LAB
ALBUMIN SERPL-MCNC: 3.7 G/DL (ref 3.6–5.1)
SHBG SERPL-SCNC: 18 NMOL/L (ref 10–50)
TESTOST FREE SERPL-MCNC: 6.6 PG/ML (ref 46–224)
TESTOST SERPL-MCNC: 34 NG/DL (ref 250–1100)
TESTOSTERONE.FREE+WB SERPL-MCNC: 11.2 NG/DL (ref 110–575)

## 2020-06-05 ENCOUNTER — TELEPHONE (OUTPATIENT)
Dept: ENDOSCOPY | Facility: HOSPITAL | Age: 45
End: 2020-06-05

## 2020-06-05 NOTE — TELEPHONE ENCOUNTER
COVID Screening     1. Have you had a fever in the last 7 days or have you used fever reducing medicines for a fever in the last 7 days?  no    2. Are you experiencing shortness of breath, cough, muscle aches, loss of taste or loss of smell?  no    3. Are you residing with anyone who has tested positive for Covid?  no    If answered yes to any of the above questions, the pt must be scheduled for an appointment with their PCP.    A message also needs to be sent to the endoscopist to ensure the patient gets rescheduled at a later date.     ENDO screening    1. Have you been admitted overnight to the hospital in the past 3 months? no   If yes, schedule an appointment with PCP before scheduling endoscopic procedure.     2. Have you had a stent placed in the last 12 months? no   If yes, for a screening visit, cancel and message the ordering provider.  The patient will need a new order when the time is appropriate.     3. Have you had a stroke or heart attack in the past 6 months? no   If yes, cancel and refer patient to ordering provider for clearance, also message ordering provider to inform.     4. Have you had any chest pain in the past 3 months? no   If yes, Have you been evaluated by your PCP and/or cardiologist and it was determined to not be heart related? not applicable   If No, Pt needs to be seen by PCP or Cardiologist .  Pt can be scheduled once clearance obtained by either of those providers.     5. Do you take prescription weight loss medications?  no   If yes, must stop for 2 weeks prior to procedure.     6. Have you been diagnosed with diverticulitis within the past 3 months? no   If yes, must have been seen by GI within the last 3 months, if not schedule with GI JONAS.    If pt has been seen by GI, schedule procedure 8-12 weeks post antibiotic treatment.     7. Are you on Dialysis? no  If yes, schedule procedure for the day AFTER dialysis.  Appt time should be 9am or later, patient arrival time is 2 hours  "prior.  Nulytely or    miralax prep for all patients with kidney disease.     8. Are you diabetic?  no   If yes, schedule morning appt. Advise pt to hold all diabetic meds day of procedure.     9. If pt is older than 80 years of age and HAS NOT been seen by GI or PCP within the last 6 months, needs appt with GI JONAS.   If pt has been seen by the GI provider or PCP within the past 6  months AND meets criteria, schedule procedure AND send message to the endoscopist.     10. Is patient on a "high risk" medication (blood thinner/antiplatelet agent)?  no   If yes, has cardiac clearance been obtained within the last 60 days? N/A   If no, a new clearance needs to be obtained.       I have reviewed the last colonoscopy for recommendations regarding next procedure bowel prep.  yes  I have reviewed medications and allergies.  yes  I have verified the pharmacy information and appropriate prep sent if needed. yes  Prep instructions have been mailed or sent to portal per patient request. yes    If answers yes to any of the following, schedule at [facility] ONLY. If No, OK for either location.     Is BMI over 45?   Any complaints of chest pain, new onset or at rest?  Does pt have an AICD?  Is there a diagnosis of heart failure?  Does patient have an insulin pump?  If procedure for esophageal banding?      "

## 2020-06-08 ENCOUNTER — TELEPHONE (OUTPATIENT)
Dept: UROLOGY | Facility: CLINIC | Age: 45
End: 2020-06-08

## 2020-06-10 ENCOUNTER — PATIENT OUTREACH (OUTPATIENT)
Dept: ADMINISTRATIVE | Facility: OTHER | Age: 45
End: 2020-06-10

## 2020-06-10 ENCOUNTER — HOSPITAL ENCOUNTER (OUTPATIENT)
Dept: RADIOLOGY | Facility: HOSPITAL | Age: 45
Discharge: HOME | End: 2020-06-10
Attending: UROLOGY
Payer: COMMERCIAL

## 2020-06-10 DIAGNOSIS — R79.89 ELEVATED PROLACTIN LEVEL: ICD-10-CM

## 2020-06-10 PROCEDURE — 70553 MRI BRAIN STEM W/O & W/DYE: CPT | Mod: 26,,, | Performed by: RADIOLOGY

## 2020-06-10 PROCEDURE — 70553 MRI PITUITARY W W/O CONTRAST: ICD-10-PCS | Mod: 26,,, | Performed by: RADIOLOGY

## 2020-06-10 PROCEDURE — 70553 MRI BRAIN STEM W/O & W/DYE: CPT | Mod: TC

## 2020-06-10 PROCEDURE — 25500020 PHARM REV CODE 255: Performed by: UROLOGY

## 2020-06-10 PROCEDURE — A9585 GADOBUTROL INJECTION: HCPCS | Performed by: UROLOGY

## 2020-06-10 RX ORDER — GADOBUTROL 604.72 MG/ML
10 INJECTION INTRAVENOUS
Status: COMPLETED | OUTPATIENT
Start: 2020-06-10 | End: 2020-06-10

## 2020-06-10 RX ADMIN — GADOBUTROL 10 ML: 604.72 INJECTION INTRAVENOUS at 05:06

## 2020-06-15 ENCOUNTER — OFFICE VISIT (OUTPATIENT)
Dept: UROLOGY | Facility: CLINIC | Age: 45
End: 2020-06-15
Payer: COMMERCIAL

## 2020-06-15 ENCOUNTER — TELEPHONE (OUTPATIENT)
Dept: UROLOGY | Facility: CLINIC | Age: 45
End: 2020-06-15

## 2020-06-15 ENCOUNTER — LAB VISIT (OUTPATIENT)
Dept: LAB | Facility: HOSPITAL | Age: 45
End: 2020-06-15
Attending: NEUROLOGICAL SURGERY
Payer: COMMERCIAL

## 2020-06-15 ENCOUNTER — OFFICE VISIT (OUTPATIENT)
Dept: NEUROSURGERY | Facility: CLINIC | Age: 45
End: 2020-06-15
Payer: COMMERCIAL

## 2020-06-15 VITALS
WEIGHT: 315 LBS | RESPIRATION RATE: 20 BRPM | BODY MASS INDEX: 47.74 KG/M2 | SYSTOLIC BLOOD PRESSURE: 124 MMHG | DIASTOLIC BLOOD PRESSURE: 78 MMHG | HEIGHT: 68 IN | HEART RATE: 126 BPM

## 2020-06-15 VITALS
TEMPERATURE: 98 F | BODY MASS INDEX: 49.43 KG/M2 | DIASTOLIC BLOOD PRESSURE: 72 MMHG | WEIGHT: 315 LBS | SYSTOLIC BLOOD PRESSURE: 122 MMHG

## 2020-06-15 DIAGNOSIS — D35.2 PROLACTINOMA: ICD-10-CM

## 2020-06-15 DIAGNOSIS — G47.37 CENTRAL SLEEP APNEA DUE TO MEDICAL CONDITION: ICD-10-CM

## 2020-06-15 DIAGNOSIS — D35.2 PROLACTINOMA: Primary | ICD-10-CM

## 2020-06-15 DIAGNOSIS — N52.9 ERECTILE DYSFUNCTION, UNSPECIFIED ERECTILE DYSFUNCTION TYPE: ICD-10-CM

## 2020-06-15 DIAGNOSIS — E29.1 HYPOGONADISM IN MALE: ICD-10-CM

## 2020-06-15 DIAGNOSIS — E66.01 MORBID OBESITY: ICD-10-CM

## 2020-06-15 LAB
BILIRUB SERPL-MCNC: NORMAL MG/DL
BLOOD URINE, POC: NORMAL
CLARITY, POC UA: CLEAR
COLOR, POC UA: YELLOW
GLUCOSE UR QL STRIP: NORMAL
KETONES UR QL STRIP: NORMAL
LEUKOCYTE ESTERASE URINE, POC: NORMAL
NITRITE, POC UA: NORMAL
PH, POC UA: 6
PROTEIN, POC: NORMAL
SPECIFIC GRAVITY, POC UA: 1.01
T4 FREE SERPL-MCNC: 0.94 NG/DL (ref 0.71–1.51)
TSH SERPL DL<=0.005 MIU/L-ACNC: 1.02 UIU/ML (ref 0.4–4)
UROBILINOGEN, POC UA: NORMAL

## 2020-06-15 PROCEDURE — 83003 ASSAY GROWTH HORMONE (HGH): CPT

## 2020-06-15 PROCEDURE — 84481 FREE ASSAY (FT-3): CPT

## 2020-06-15 PROCEDURE — 84439 ASSAY OF FREE THYROXINE: CPT

## 2020-06-15 PROCEDURE — 36415 COLL VENOUS BLD VENIPUNCTURE: CPT

## 2020-06-15 PROCEDURE — 82533 TOTAL CORTISOL: CPT

## 2020-06-15 PROCEDURE — 99999 PR PBB SHADOW E&M-EST. PATIENT-LVL IV: CPT | Mod: PBBFAC,,, | Performed by: NEUROLOGICAL SURGERY

## 2020-06-15 PROCEDURE — 81002 URINALYSIS NONAUTO W/O SCOPE: CPT | Mod: S$GLB,,, | Performed by: UROLOGY

## 2020-06-15 PROCEDURE — 99999 PR PBB SHADOW E&M-EST. PATIENT-LVL III: ICD-10-PCS | Mod: PBBFAC,,, | Performed by: UROLOGY

## 2020-06-15 PROCEDURE — 84305 ASSAY OF SOMATOMEDIN: CPT

## 2020-06-15 PROCEDURE — 99999 PR PBB SHADOW E&M-EST. PATIENT-LVL IV: ICD-10-PCS | Mod: PBBFAC,,, | Performed by: NEUROLOGICAL SURGERY

## 2020-06-15 PROCEDURE — 99214 OFFICE O/P EST MOD 30 MIN: CPT | Mod: 25,S$GLB,, | Performed by: UROLOGY

## 2020-06-15 PROCEDURE — 99205 OFFICE O/P NEW HI 60 MIN: CPT | Mod: S$GLB,,, | Performed by: NEUROLOGICAL SURGERY

## 2020-06-15 PROCEDURE — 81002 POCT URINE DIPSTICK WITHOUT MICROSCOPE: ICD-10-PCS | Mod: S$GLB,,, | Performed by: UROLOGY

## 2020-06-15 PROCEDURE — 99205 PR OFFICE/OUTPT VISIT, NEW, LEVL V, 60-74 MIN: ICD-10-PCS | Mod: S$GLB,,, | Performed by: NEUROLOGICAL SURGERY

## 2020-06-15 PROCEDURE — 99214 PR OFFICE/OUTPT VISIT, EST, LEVL IV, 30-39 MIN: ICD-10-PCS | Mod: 25,S$GLB,, | Performed by: UROLOGY

## 2020-06-15 PROCEDURE — 84443 ASSAY THYROID STIM HORMONE: CPT

## 2020-06-15 PROCEDURE — 99999 PR PBB SHADOW E&M-EST. PATIENT-LVL III: CPT | Mod: PBBFAC,,, | Performed by: UROLOGY

## 2020-06-15 RX ORDER — CABERGOLINE 0.5 MG/1
0.5 TABLET ORAL
Qty: 10 TABLET | Refills: 11 | Status: SHIPPED | OUTPATIENT
Start: 2020-06-15 | End: 2020-07-02 | Stop reason: SDUPTHER

## 2020-06-15 NOTE — TELEPHONE ENCOUNTER
----- Message from Nara Linder sent at 6/15/2020 11:42 AM CDT -----  Regarding: speak with staff  Pt is calling in to speak with staff. Please call back. [phone]  Thanks

## 2020-06-15 NOTE — PROGRESS NOTES
Subjective:      Patient ID: Dylan Ariza is a 45 y.o. male.  He is referred by Dr. Connors for prolactinoma that was discovered during a workup for impotence and erectile dysfunction.  The patient denies visual difficulties or headaches, but does admit to generalized fatigue.  He has known hypogonadism based on laboratory evaluation by Dr. Connors.    Chief Complaint: Consult (Elevated Prolactin Level (prolactinoma))    HPI- the patient presented to his urologist a couple of weeks ago with chief complaints of erectile dysfunction.  He had tried testosterone injections in the past due to low levels, however this was ineffective.  He denies significant headaches or visual loss.  Complains of a swelling sensation in the throat.  He complains of significant fatigue.  At times..  Review of Systems   Constitutional: Positive for fatigue and unexpected weight change.   HENT: Positive for trouble swallowing.         Has sensation of swelling in throat.    Genitourinary:        Erectile dysfunction         Objective:     Vitals:    06/15/20 1321   BP: 124/78   Pulse: (!) 126   Resp: 20      Review of patient's allergies indicates:  No Known Allergies   Body mass index is 48.14 kg/m².     Physical Exam:  Nursing note and vitals reviewed.    Constitutional: He appears well-developed and well-nourished.     Eyes: Pupils are equal, round, and reactive to light. Conjunctivae and EOM are normal.     Cardiovascular: Normal rate, regular rhythm and normal pulses.     Abdominal: Soft. Bowel sounds are normal.     Psych/Behavior: He is alert. He is oriented to person, place, and time. He has a normal mood and affect.     Musculoskeletal: Gait is normal.        Neck: Range of motion is full. Muscle strength is 5/5. Tone is normal.        Back: Range of motion is full. Muscle strength is 5/5. Tone is normal.        Right Upper Extremities: Range of motion is full. Muscle strength is 5/5. Tone is normal.        Left Upper  Extremities: Range of motion is full. Muscle strength is 5/5. Tone is normal.       Right Lower Extremities: Range of motion is full.        Left Lower Extremities: Range of motion is full.     Neurological:        Coordination: He has a normal Romberg Test, normal finger to nose coordination and normal tandem walking coordination.        Sensory: There is no sensory deficit in the trunk. There is no sensory deficit in the extremities.        DTRs: DTRs are DTRS NORMAL AND SYMMETRICnormal and symmetric. Tricep reflexes are 2+ on the right side and 2+ on the left side. Bicep reflexes are 2+ on the right side and 2+ on the left side. Brachioradialis reflexes are 2+ on the right side and 2+ on the left side. Patellar reflexes are 2+ on the right side and 2+ on the left side. Achilles reflexes are 2+ on the right side and 2+ on the left side. He displays no Babinski's sign on the right side. He displays no Babinski's sign on the left side.        Cranial nerves: Cranial nerve(s) II, III, IV, V, VI, VII, VIII, IX, X, XI and XII are intact.   Visual fields full to confrontation    General    Nursing note and vitals reviewed.  Constitutional: He is oriented to person, place, and time. He appears well-developed and well-nourished.   Eyes: Conjunctivae and EOM are normal. Pupils are equal, round, and reactive to light.   Cardiovascular: Normal rate, regular rhythm and normal pulses.    Abdominal: Soft. Bowel sounds are normal.   Neurological: He is alert and oriented to person, place, and time.   Psychiatric: He has a normal mood and affect.     General Musculoskeletal Exam   Gait: normal         Reflexes     Left Side  Biceps:  2+  Triceps:  2+  Brachioradialis:  2+  Achilles:  2+  Babinski Sign:  absent    Right Side   Biceps:  2+  Triceps:  2+  Brachioradialis:  2+  Achilles:  2+  Babinski Sign:  absent    I  reviewed all pertinent imaging regarding this case.  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  The stalk is tilted to the right where the normal gland appears tightly compressed.  There is slight suprasellar extension, however no effacement of the visual apparatus.  Assessment:     1. Prolactinoma     2.  Possible sleep apnea  Plan:   The patient is beginning cabergoline 500 mg p.o. twice weekly.  I like to see him back in a month's time with a follow-up prolactin level to see how he has responded to the cabergoline.  I have also asked for other endocrine labs including thyroid panel in random cortisol to check for any deficiencies.  The semi he gets a good response from the cabergoline, we will follow up with an MRI of the pituitary in about 6 months to evaluate for tumor volume contraction.  I have asked for sleep study due to some of his symptoms that are suggestive of sleep apnea.  Prolactinoma  -     TSH; Future; Expected date: 06/15/2020  -     T4, free; Future; Expected date: 06/15/2020  -     T3, free; Future; Expected date: 06/15/2020  -     CORTISOL, RANDOM; Future; Expected date: 06/15/2020  -     Insulin-like growth factor; Future; Expected date: 06/15/2020  -     GROWTH HORMONE; Future; Expected date: 06/15/2020  -     PROLACTIN; Future; Expected date: 06/29/2020        Thank you for the referral   Please call with any questions    Ashish Mohr MD  Neurosurgery

## 2020-06-15 NOTE — PATIENT INSTRUCTIONS
We will see you back in a couple of weeks to see if your prolactin level falls after starting the cabergoline.  We are checking other blood work to make sure your other pituitary hormones are at the proper levels and not suppressed by the tumor.  We are trying to arrange a sleep study to rule out obstructive sleep apnea.  Assuming the tumor is well controlled with the cabergoline, we will probably repeat an MRI in 6 months to ensure that the tumor is shrinking.

## 2020-06-15 NOTE — LETTER
Gudelia 15, 2020      Robson Connors IV, MD  [address] [place] [address] [address] [place] [address]           [facility]  [address] [facility]  [place] [address]  Phone: [phone]  Fax: [phone]          Patient: Dylan Ariza   MR Number: [id number]   YOB: 1975   Date of Visit: 6/15/2020       Dear Dr. Robson Connors IV:    Thank you for referring Dylan Ariza to me for evaluation. Attached you will find relevant portions of my assessment and plan of care.    If you have questions, please do not hesitate to call me. I look forward to following Dylan Ariza along with you.    Sincerely,    Ashish Mohr MD    Enclosure  CC:  No Recipients    If you would like to receive this communication electronically, please contact [email or web address] or [phone] to request more information on [email or web address] Link access.    For providers and/or their staff who would like to refer a patient to [facility], please contact us through our one-stop-shop provider referral line, [facility], at [phone].    If you feel you have received this communication in error or would no longer like to receive these types of communications, please e-mail [email or web address]

## 2020-06-15 NOTE — TELEPHONE ENCOUNTER
Spoke with pt's SO, explained that Dr. Connors referred pt to Neurosurgery to follow on mass. She verbalized understanding.

## 2020-06-15 NOTE — PROGRESS NOTES
Chief Complaint: ED    HPI:   6/15/20: T low, Prolactin >800, MRI confirms prolactinoma of 2.5cm size without optic nerve involvement.  5/28/20: 46 yo man here to talk about ED.  Doesn't last more than a few minutes and can't finish and sometimes can't start.  Has tried T shots in the past.  Tried viagra once and was redirected away from it.  Epigastric pain sometimes and sometimes real bad.  No current abd/pelvic pain and no exac/rel factors.  No hematuria.  No urolithiasis.  No urinary bother.  No  history.  No family CaP history.  T is very low.  Wants to protect fertility.    Allergies:  Patient has no known allergies.    Medications:  has a current medication list which includes the following prescription(s): amitriptyline, cyanocobalamin, fluticasone propionate, ibuprofen, tizanidine, pantoprazole, sildenafil, sodium,potassium,mag sulfates, and zolpidem.    Review of Systems:  General: No fever, chills, fatigability, or weight loss.  Skin: No rashes, itching, or changes in color or texture of skin.  Chest: Denies SIU, cyanosis, wheezing, cough, and sputum production.  Abdomen: Appetite fine. No weight loss. Denies diarrhea, abdominal pain, hematemesis, or blood in stool.  Musculoskeletal: No joint stiffness or swelling. Denies back pain.  : As above.  All other review of systems negative.    PMH:   has a past medical history of Chronic back pain, Chronic cough, GERD (gastroesophageal reflux disease), Globus sensation, Insulin resistance, Mixed hyperlipidemia (12/13/2018), and Testosterone deficiency.    PSH:   has a past surgical history that includes Sleeve Gastroplasty.    FamHx: family history includes Cancer in his paternal grandmother; Heart disease in his father; Heart failure in his father; Hypertension in his father; Schizophrenia in his mother; Stroke in his father.    SocHx:  reports that he quit smoking about 3 months ago. His smoking use included cigars. He quit after 20.00 years of use. He has  never used smokeless tobacco. He reports current alcohol use. He reports that he does not use drugs.      Physical Exam:  Vitals:    06/15/20 1016   BP: 122/72   Temp: 97.6 °F (36.4 °C)     General: A&Ox3, no apparent distress, no deformities  Neck: No masses, normal thyroid  Lungs: normal inspiration, no use of accessory muscles  Heart: normal pulse, no arrhythmias  Abdomen: Soft, NT, ND, no masses, no hernias, no hepatosplenomegaly  Lymphatic: Neck and groin nodes negative  Skin: The skin is warm and dry. No jaundice.  Ext: No c/c/e.  : Test desc moinsha, no abnormalities of epididymus. Penis normal, with normal penile and scrotal skin. Meatus normal.     Labs/Studies:   PSA    3/19: 0.36    Impression/Plan:   1. Obesity: driving factor in problems  2. Low T: profound; from prolactinoma.  Will treat with cabergoline and refer to neurosurgeon for options.  3. Low risk of prostate cancer  4. ED: sildenafil hasn't been tried yet  5. Semen analysis when he is ready

## 2020-06-16 ENCOUNTER — TELEPHONE (OUTPATIENT)
Dept: NEUROSURGERY | Facility: CLINIC | Age: 45
End: 2020-06-16

## 2020-06-16 LAB
CORTIS SERPL-MCNC: 10.2 UG/DL
GH SERPL-MCNC: <0.1 NG/ML (ref 0–3)
T3FREE SERPL-MCNC: 2.9 PG/ML (ref 2.3–4.2)

## 2020-06-16 RX ORDER — TIZANIDINE 4 MG/1
TABLET ORAL
Qty: 30 TABLET | Refills: 0 | OUTPATIENT
Start: 2020-06-16

## 2020-06-16 NOTE — TELEPHONE ENCOUNTER
Dr Ashish Marcus (Neurosurgeron) wants to obtain a Sleep Study on patient, however unsure how to go about to order testing for patient?       I hope we have the right dept to help get this matter resolve, please advise us on what is needed.      Sincerely,  [facility] - Neurosurgery Dept

## 2020-06-19 LAB
IGF-I SERPL-MCNC: 235 NG/ML (ref 44–275)
IGF-I Z-SCORE SERPL: 1.65 SD

## 2020-06-25 ENCOUNTER — PATIENT MESSAGE (OUTPATIENT)
Dept: UROLOGY | Facility: CLINIC | Age: 45
End: 2020-06-25

## 2020-06-25 NOTE — TELEPHONE ENCOUNTER
Is it possible to get patient scheduled for consult so that testing can order therefore after? Please advise!

## 2020-06-26 ENCOUNTER — PATIENT OUTREACH (OUTPATIENT)
Dept: ADMINISTRATIVE | Facility: OTHER | Age: 45
End: 2020-06-26

## 2020-06-26 NOTE — TELEPHONE ENCOUNTER
Okay let accept if anything sooner can we make contact w/ patient. Thanks for helping to get this matter address.

## 2020-06-26 NOTE — PROGRESS NOTES
Requested updates within Care Everywhere.  Patient's chart was reviewed for overdue EDIN topics.  Immunizations reconciled.

## 2020-06-28 ENCOUNTER — LAB VISIT (OUTPATIENT)
Dept: URGENT CARE | Facility: CLINIC | Age: 45
End: 2020-06-28
Payer: COMMERCIAL

## 2020-06-28 DIAGNOSIS — K21.9 GASTROESOPHAGEAL REFLUX DISEASE, ESOPHAGITIS PRESENCE NOT SPECIFIED: ICD-10-CM

## 2020-06-28 DIAGNOSIS — R09.A2 GLOBUS SENSATION: ICD-10-CM

## 2020-06-28 DIAGNOSIS — R05.3 CHRONIC COUGH: ICD-10-CM

## 2020-06-28 DIAGNOSIS — Z12.11 COLON CANCER SCREENING: ICD-10-CM

## 2020-06-28 DIAGNOSIS — R10.13 EPIGASTRIC PAIN: ICD-10-CM

## 2020-06-28 PROCEDURE — U0003 INFECTIOUS AGENT DETECTION BY NUCLEIC ACID (DNA OR RNA); SEVERE ACUTE RESPIRATORY SYNDROME CORONAVIRUS 2 (SARS-COV-2) (CORONAVIRUS DISEASE [COVID-19]), AMPLIFIED PROBE TECHNIQUE, MAKING USE OF HIGH THROUGHPUT TECHNOLOGIES AS DESCRIBED BY CMS-2020-01-R: HCPCS

## 2020-06-29 ENCOUNTER — OFFICE VISIT (OUTPATIENT)
Dept: NEUROSURGERY | Facility: CLINIC | Age: 45
End: 2020-06-29
Payer: COMMERCIAL

## 2020-06-29 ENCOUNTER — LAB VISIT (OUTPATIENT)
Dept: LAB | Facility: HOSPITAL | Age: 45
End: 2020-06-29
Attending: NEUROLOGICAL SURGERY
Payer: COMMERCIAL

## 2020-06-29 VITALS
RESPIRATION RATE: 18 BRPM | WEIGHT: 306.44 LBS | DIASTOLIC BLOOD PRESSURE: 92 MMHG | SYSTOLIC BLOOD PRESSURE: 136 MMHG | HEIGHT: 69 IN | HEART RATE: 109 BPM | BODY MASS INDEX: 45.39 KG/M2

## 2020-06-29 DIAGNOSIS — D35.2 PROLACTINOMA: Primary | ICD-10-CM

## 2020-06-29 DIAGNOSIS — D35.2 PROLACTINOMA: ICD-10-CM

## 2020-06-29 PROCEDURE — 99213 OFFICE O/P EST LOW 20 MIN: CPT | Mod: S$GLB,,, | Performed by: NEUROLOGICAL SURGERY

## 2020-06-29 PROCEDURE — 99213 PR OFFICE/OUTPT VISIT, EST, LEVL III, 20-29 MIN: ICD-10-PCS | Mod: S$GLB,,, | Performed by: NEUROLOGICAL SURGERY

## 2020-06-29 PROCEDURE — 36415 COLL VENOUS BLD VENIPUNCTURE: CPT

## 2020-06-29 PROCEDURE — 99999 PR PBB SHADOW E&M-EST. PATIENT-LVL IV: ICD-10-PCS | Mod: PBBFAC,,, | Performed by: NEUROLOGICAL SURGERY

## 2020-06-29 PROCEDURE — 84146 ASSAY OF PROLACTIN: CPT

## 2020-06-29 PROCEDURE — 99999 PR PBB SHADOW E&M-EST. PATIENT-LVL IV: CPT | Mod: PBBFAC,,, | Performed by: NEUROLOGICAL SURGERY

## 2020-06-29 NOTE — PROGRESS NOTES
Subjective:      Patient ID: Dylan Ariza is a 45 y.o. male.    Chief Complaint: Follow-up (Prolactinoma, labs )    Mr. Ariza returns in follow-up of his known macro-prolactinoma.  He continues on Dostinex 500 mg p.o. twice weekly.  He has had his prolactin level drawn this morning, however it is not available as of yet.  He has no new symptoms or problems.  He is preparing to enter alcohol rehab for 60 days in the [facility].    Past Medical History:   Diagnosis Date    Chronic back pain     Chronic cough     GERD (gastroesophageal reflux disease)     Globus sensation     Insulin resistance     Mixed hyperlipidemia 2018    Testosterone deficiency      Past Surgical History:   Procedure Laterality Date    SLEEVE GASTROPLASTY       Family History   Problem Relation Age of Onset    Heart disease Father     Hypertension Father     Stroke Father     Heart failure Father     Cancer Paternal Grandmother     Schizophrenia Mother     Diabetes Neg Hx      Social History     Tobacco Use    Smoking status: Former Smoker     Years: 20.00     Types: Cigars     Quit date: 2020     Years since quittin.3    Smokeless tobacco: Never Used    Tobacco comment: smoked 5 cigars/week   Substance Use Topics    Alcohol use: Yes     Frequency: 2-4 times a month     Drinks per session: 3 or 4     Binge frequency: Monthly     Comment: 2 pints/ weekend    Drug use: No     Current Outpatient Medications on File Prior to Visit   Medication Sig Dispense Refill    amitriptyline (ELAVIL) 50 MG tablet TAKE 1 TABLET(50 MG) BY MOUTH EVERY NIGHT AS NEEDED FOR INSOMNIA 90 tablet 1    cabergoline (DOSTINEX) 0.5 mg tablet Take 1 tablet (0.5 mg total) by mouth twice a week. 10 tablet 11    cyanocobalamin (VITAMIN B-12) 100 MCG tablet Take by mouth daily as needed.      fluticasone propionate (CHILDREN'S FLONASE ALLERGY RLF) 50 mcg/actuation nasal spray 1 spray (50 mcg total) by Each Nostril  route once daily. 1 Bottle 0    ibuprofen (ADVIL,MOTRIN) 800 MG tablet Take 1 tablet (800 mg total) by mouth 3 (three) times daily. 60 tablet 1    pantoprazole (PROTONIX) 40 MG tablet Take 1 tablet (40 mg total) by mouth once daily. 90 tablet 1    sildenafiL (VIAGRA) 100 MG tablet Take 1 tablet (100 mg total) by mouth daily as needed. 30 tablet 11    sodium,potassium,mag sulfates (SUPREP BOWEL PREP KIT) 17.5-3.13-1.6 gram SolR As directed for colonoscopy 254 mL 0    tiZANidine (ZANAFLEX) 4 MG tablet TAKE 1 TABLET(4 MG) BY MOUTH EVERY 6 HOURS AS NEEDED (Patient not taking: Reported on 6/29/2020) 30 tablet 0    zolpidem (AMBIEN) 10 mg Tab TAKE 1 TABLET(10 MG) BY MOUTH EVERY NIGHT AS NEEDED 30 tablet 0     No current facility-administered medications on file prior to visit.      Review of Systems   Constitutional: Negative for diaphoresis, fatigue and unexpected weight change.   HENT: Negative for hearing loss, rhinorrhea, trouble swallowing and voice change.    Eyes: Negative for photophobia and visual disturbance.   Respiratory: Negative for chest tightness and shortness of breath.    Cardiovascular: Negative for chest pain.   Gastrointestinal: Negative for constipation, nausea and vomiting.   Endocrine: Negative for cold intolerance, heat intolerance, polydipsia, polyphagia and polyuria.        Erectile dysfunction   Genitourinary: Negative for difficulty urinating.   Musculoskeletal: Negative for back pain, neck pain and neck stiffness.   Neurological: Negative for dizziness, tremors, seizures, syncope, facial asymmetry, speech difficulty, weakness, numbness and headaches.   Hematological: Negative for adenopathy. Does not bruise/bleed easily.   Psychiatric/Behavioral: Negative for behavioral problems, confusion and decreased concentration.         Objective:     Vitals:    06/29/20 1413   BP: (!) 136/92   Pulse: 109   Resp: 18      Review of patient's allergies indicates:  No Known Allergies   Body mass  index is 45.25 kg/m².     Physical Exam:  Nursing note and vitals reviewed.    Constitutional: He appears well-developed and well-nourished.     Eyes: Pupils are equal, round, and reactive to light. Conjunctivae and EOM are normal.     Cardiovascular: Normal rate, regular rhythm and normal pulses.     Abdominal: Soft. Bowel sounds are normal.     Psych/Behavior: He is alert. He is oriented to person, place, and time. He has a normal mood and affect.     Musculoskeletal: Gait is normal.        Neck: Range of motion is full. Muscle strength is 5/5. Tone is normal.        Back: Range of motion is full. Muscle strength is 5/5. Tone is normal.        Right Upper Extremities: Range of motion is full. Muscle strength is 5/5. Tone is normal.        Left Upper Extremities: Range of motion is full. Muscle strength is 5/5. Tone is normal.       Right Lower Extremities: Range of motion is full. Muscle strength is 5/5.        Left Lower Extremities: Range of motion is full. Muscle strength is 5/5.     Neurological:        Coordination: He has a normal Romberg Test, normal finger to nose coordination and normal tandem walking coordination.        Sensory: There is no sensory deficit in the trunk. There is no sensory deficit in the extremities.        DTRs: DTRs are DTRS NORMAL AND SYMMETRICnormal and symmetric. Tricep reflexes are 2+ on the right side and 2+ on the left side. Bicep reflexes are 2+ on the right side and 2+ on the left side. Brachioradialis reflexes are 2+ on the right side and 2+ on the left side. Patellar reflexes are 2+ on the right side and 2+ on the left side. Achilles reflexes are 2+ on the right side and 2+ on the left side. He displays no Babinski's sign on the right side. He displays no Babinski's sign on the left side.        Cranial nerves: Cranial nerve(s) II, III, IV, V, VI, VII, VIII, IX, X, XI and XII are intact.     Neurologic Exam     Mental Status   Oriented to person, place, and time.     Cranial  Nerves     CN III, IV, VI   Pupils are equal, round, and reactive to light.  Extraocular motions are normal.     CN VIII   CN VIII normal.     CN IX, X   CN IX normal.   CN X normal.     CN XI   CN XI normal.     CN XII   CN XII normal.     Gait, Coordination, and Reflexes     Coordination   Romberg: negative    Reflexes   Right brachioradialis: 2+  Left brachioradialis: 2+  Right biceps: 2+  Left biceps: 2+  Right triceps: 2+  Left triceps: 2+  Right patellar: 2+  Left patellar: 2+  Right achilles: 2+  Left achilles: 2+    I  reviewed all pertinent imaging regarding this case.  He has a previously seen MRI of the pituitary demonstrating a sizable macro prolactinoma to the left of midline.  There is no chiasmatic effacement.  Assessment:     Macro prolactinoma  Plan:     We will review his prolactin level today when it becomes available.  If he is still running levels above 30, we may increase his dose of cabergoline.  We will see him back in 3 months time with a repeat prolactin level.  I plan to see him back in 6 months time with a prolactin level and a pituitary MRI with and without contrast to assess the degree of tumor contraction.  I discussed with the patient the treatment plan at length, and answered all of his questions.  Understanding the treatment plan he would like to proceed as outlined.    Thank you for the referral   Please call with any questions    Ashish Mohr MD  Neurosurgery

## 2020-06-29 NOTE — PATIENT INSTRUCTIONS
We plan to see back in 3 months time with another prolactin level.  The prolactin level obtained today will be reviewed today and we will communicate this to you through the portal and give any instructions necessary.  We ultimately plan an MRI of the pituitary in about 6 months to see if the tumor has contracted.  Please call if you have any problems or questions.

## 2020-06-30 LAB
PROLACTIN SERPL IA-MCNC: 4.1 NG/ML (ref 3.5–19.4)
SARS-COV-2 RNA RESP QL NAA+PROBE: NOT DETECTED

## 2020-07-15 ENCOUNTER — DOCUMENTATION ONLY (OUTPATIENT)
Dept: ENDOSCOPY | Facility: HOSPITAL | Age: 45
End: 2020-07-15

## 2020-07-15 NOTE — PROGRESS NOTES
Pt no showed for scheduled EGD/COlon/Bravo on 6/30/2020. After chart review pt called to cancel appt as he is moving to [place] for rehab treatment. Procedures will be removed from depo at this time. Please enter new orders if/when pt follows up again or as needed.

## 2020-08-17 ENCOUNTER — PATIENT MESSAGE (OUTPATIENT)
Dept: UROLOGY | Facility: CLINIC | Age: 45
End: 2020-08-17

## 2020-09-30 ENCOUNTER — TELEPHONE (OUTPATIENT)
Dept: NEUROSURGERY | Facility: CLINIC | Age: 45
End: 2020-09-30

## 2020-09-30 NOTE — TELEPHONE ENCOUNTER
10/1/2020 8:50 AM LABORATORY, [facility] - Laboratory [facility]   10/1/2020 10:30 AM [facility] - Neurosurgery  Medical C     Spoke w/ patient he indicated he would like to cancel appt's - no reasoning given - declined rescheduling appt

## 2020-12-18 ENCOUNTER — TELEPHONE (OUTPATIENT)
Dept: RADIOLOGY | Facility: HOSPITAL | Age: 45
End: 2020-12-18

## 2020-12-28 ENCOUNTER — PATIENT OUTREACH (OUTPATIENT)
Dept: ADMINISTRATIVE | Facility: OTHER | Age: 45
End: 2020-12-28

## 2020-12-29 NOTE — PROGRESS NOTES
Health Maintenance Due   Topic Date Due    Hepatitis C Screening  1975    TETANUS VACCINE  03/19/1993    Influenza Vaccine (1) 08/01/2020     Updates were requested from care everywhere.  Chart was reviewed for overdue Proactive [facility] Encounters (EDIN) topics (CRS, Breast Cancer Screening, Eye exam)  Health Maintenance has been updated.  LINKS immunization registry triggered.  Immunizations were reconciled.

## 2021-04-22 ENCOUNTER — PATIENT MESSAGE (OUTPATIENT)
Dept: ADMINISTRATIVE | Facility: HOSPITAL | Age: 46
End: 2021-04-22

## 2021-05-12 ENCOUNTER — PATIENT MESSAGE (OUTPATIENT)
Dept: RESEARCH | Facility: HOSPITAL | Age: 46
End: 2021-05-12

## 2021-12-23 DIAGNOSIS — F51.01 PRIMARY INSOMNIA: ICD-10-CM

## 2021-12-23 RX ORDER — ZOLPIDEM TARTRATE 10 MG/1
10 TABLET ORAL NIGHTLY PRN
Qty: 30 TABLET | Refills: 0 | Status: CANCELLED | OUTPATIENT
Start: 2021-12-23

## 2022-01-08 ENCOUNTER — PATIENT MESSAGE (OUTPATIENT)
Dept: ADMINISTRATIVE | Facility: OTHER | Age: 47
End: 2022-01-08
Payer: COMMERCIAL

## 2022-01-08 ENCOUNTER — OFFICE VISIT (OUTPATIENT)
Dept: URGENT CARE | Facility: CLINIC | Age: 47
End: 2022-01-08
Payer: COMMERCIAL

## 2022-01-08 VITALS
WEIGHT: 306 LBS | HEIGHT: 69 IN | SYSTOLIC BLOOD PRESSURE: 140 MMHG | TEMPERATURE: 98 F | BODY MASS INDEX: 45.32 KG/M2 | OXYGEN SATURATION: 100 % | HEART RATE: 78 BPM | DIASTOLIC BLOOD PRESSURE: 84 MMHG | RESPIRATION RATE: 18 BRPM

## 2022-01-08 DIAGNOSIS — E66.01 MORBID OBESITY: ICD-10-CM

## 2022-01-08 DIAGNOSIS — Z20.822 SUSPECTED COVID-19 VIRUS INFECTION: ICD-10-CM

## 2022-01-08 DIAGNOSIS — R05.9 COUGH: Primary | ICD-10-CM

## 2022-01-08 DIAGNOSIS — Z20.822 CLOSE EXPOSURE TO COVID-19 VIRUS: ICD-10-CM

## 2022-01-08 LAB
CTP QC/QA: YES
SARS-COV-2 RDRP RESP QL NAA+PROBE: NEGATIVE

## 2022-01-08 PROCEDURE — U0002: ICD-10-PCS | Mod: QW,S$GLB,, | Performed by: NURSE PRACTITIONER

## 2022-01-08 PROCEDURE — 1160F RVW MEDS BY RX/DR IN RCRD: CPT | Mod: CPTII,S$GLB,, | Performed by: NURSE PRACTITIONER

## 2022-01-08 PROCEDURE — U0002 COVID-19 LAB TEST NON-CDC: HCPCS | Mod: QW,S$GLB,, | Performed by: NURSE PRACTITIONER

## 2022-01-08 PROCEDURE — 3077F PR MOST RECENT SYSTOLIC BLOOD PRESSURE >= 140 MM HG: ICD-10-PCS | Mod: CPTII,S$GLB,, | Performed by: NURSE PRACTITIONER

## 2022-01-08 PROCEDURE — 3079F PR MOST RECENT DIASTOLIC BLOOD PRESSURE 80-89 MM HG: ICD-10-PCS | Mod: CPTII,S$GLB,, | Performed by: NURSE PRACTITIONER

## 2022-01-08 PROCEDURE — 1159F PR MEDICATION LIST DOCUMENTED IN MEDICAL RECORD: ICD-10-PCS | Mod: CPTII,S$GLB,, | Performed by: NURSE PRACTITIONER

## 2022-01-08 PROCEDURE — 3008F BODY MASS INDEX DOCD: CPT | Mod: CPTII,S$GLB,, | Performed by: NURSE PRACTITIONER

## 2022-01-08 PROCEDURE — 99214 PR OFFICE/OUTPT VISIT, EST, LEVL IV, 30-39 MIN: ICD-10-PCS | Mod: S$GLB,,, | Performed by: NURSE PRACTITIONER

## 2022-01-08 PROCEDURE — 3008F PR BODY MASS INDEX (BMI) DOCUMENTED: ICD-10-PCS | Mod: CPTII,S$GLB,, | Performed by: NURSE PRACTITIONER

## 2022-01-08 PROCEDURE — 1160F PR REVIEW ALL MEDS BY PRESCRIBER/CLIN PHARMACIST DOCUMENTED: ICD-10-PCS | Mod: CPTII,S$GLB,, | Performed by: NURSE PRACTITIONER

## 2022-01-08 PROCEDURE — 3077F SYST BP >= 140 MM HG: CPT | Mod: CPTII,S$GLB,, | Performed by: NURSE PRACTITIONER

## 2022-01-08 PROCEDURE — 3079F DIAST BP 80-89 MM HG: CPT | Mod: CPTII,S$GLB,, | Performed by: NURSE PRACTITIONER

## 2022-01-08 PROCEDURE — 1159F MED LIST DOCD IN RCRD: CPT | Mod: CPTII,S$GLB,, | Performed by: NURSE PRACTITIONER

## 2022-01-08 PROCEDURE — 99214 OFFICE O/P EST MOD 30 MIN: CPT | Mod: S$GLB,,, | Performed by: NURSE PRACTITIONER

## 2022-01-08 NOTE — PATIENT INSTRUCTIONS
PLAN: Lab work POCT rapid Covid 19 screen/ negative  Advise increase p.o. fluids--water/juice & rest  Simply saline nasal wash to irrigate sinuses and for congestion/runny nose.  Cool mist humidifier/vaporizer.  Practice good handwashing.  Advise use of green tea with honey  Tylenol or Ibuprofen for fever, headache and body aches.  Warm salt water gargles for throat comfort.  Chloraseptic spray or lozenges for throat comfort.  Advise follow up with PCP in 2-3 days for recheck  Advise go to ER if symptoms worsen or fail to improve with treatment.  Instructions, follow up, and supportive care as per AVS.  AVS provided and reviewed with patient including supportive care, follow up, and red flag symptoms.   Patient verbalizes understanding and agrees with treatment plan. Discharged from Urgent Care in stable condition.  You have tested negative for COVID-19 today. If you did not have any close exposure as defined below, then effective today, you can return to your normal daily activities including social distancing, wearing masks, and frequent handwashing.

## 2022-01-08 NOTE — PROGRESS NOTES
"Subjective:       Patient ID: Dylan Ariza is a 46 y.o. male.    Vitals:  height is 5' 9" (1.753 m) and weight is 138.8 kg (306 lb) (abnormal).     Chief Complaint: Cough    Cough and fatigue. Symptoms started 2 days. He is not vaccinated, + covid exposure.    Cough  This is a new problem. The current episode started in the past 7 days. The problem has been unchanged. The problem occurs constantly. The cough is non-productive. Associated symptoms include myalgias. Pertinent negatives include no chest pain, chills, ear congestion, ear pain, fever, headaches, heartburn, hemoptysis, nasal congestion, postnasal drip, rash, rhinorrhea, sore throat, shortness of breath, sweats, weight loss or wheezing. He has tried nothing for the symptoms. The treatment provided no relief.       Constitution: Negative for chills and fever.   HENT: Negative for ear pain, postnasal drip and sore throat.    Cardiovascular: Negative for chest pain.   Respiratory: Positive for cough. Negative for bloody sputum, shortness of breath and wheezing.    Gastrointestinal: Negative for heartburn.   Musculoskeletal: Positive for muscle ache.   Skin: Negative for rash.   Neurological: Negative for headaches.          Past Medical History:   Diagnosis Date    Chronic back pain     Chronic cough     GERD (gastroesophageal reflux disease)     Globus sensation     Insulin resistance     Mixed hyperlipidemia 2018    Testosterone deficiency          .  Past Surgical History:   Procedure Laterality Date    SLEEVE GASTROPLASTY           Social History     Socioeconomic History    Marital status: Single   Tobacco Use    Smoking status: Former Smoker     Years: 20.00     Types: Cigars     Quit date: 2020     Years since quittin.8    Smokeless tobacco: Never Used    Tobacco comment: smoked 5 cigars/week   Substance and Sexual Activity    Alcohol use: Yes     Comment: 2 pints/ weekend    Drug use: No    Sexual activity: Yes "     Partners: Female   Social History Narrative    Live w/ girlfriend        Family History   Problem Relation Age of Onset    Heart disease Father     Hypertension Father     Stroke Father     Heart failure Father     Cancer Paternal Grandmother     Schizophrenia Mother     Diabetes Neg Hx          ROS:  GENERAL: fever, chills with body aches  SKIN: No rashes, itching or changes in color or texture of skin.   HEENT:  Reports no runny nose, nasal congestion, headache  NODES: No masses or lesions. Denies swollen glands.   CHEST: reports cough   CARDIOVASCULAR: Denies chest pain, shortness of breath.  ABDOMEN: Appetite fine. No weight loss. Denies diarrhea, abdominal pain  MUSCULOSKELETAL: No joint stiffness or swelling. Denies back pain.  NEUROLOGIC: No history of seizures, paralysis, alteration of gait or coordination.  PSYCHIATRIC: Denies mood swings, depression or suicidal thoughts.    PE:   APPEARANCE: Well nourished, well developed, in mild distress.   V/S: Reviewed.  SKIN: Normal skin turgor, no lesions.  HEENT: Turbinates injected, minimal red pharynx. TM's poor light reflex bilateral, no facial tenderness.  CHEST: Lungs clear to auscultation. No wheezing  CARDIOVASCULAR: Regular rate and rhythm.  ABDOMEN: Soft. No tenderness or masses.  NEUROLOGIC: No sensory deficits. Gait & Posture: Normal, No cerebellar signs.  MENTAL STATUS: Patient alert, oriented x 3 & conversant.    PLAN: Lab work POCT rapid Covid 19 screen/ negative  Advise increase p.o. fluids--water/juice & rest  Simply saline nasal wash to irrigate sinuses and for congestion/runny nose.  Cool mist humidifier/vaporizer.  Practice good handwashing.  Advise use of green tea with honey  Tylenol or Ibuprofen for fever, headache and body aches.  Warm salt water gargles for throat comfort.  Chloraseptic spray or lozenges for throat comfort.  Advise follow up with PCP in 2-3 days for recheck  Advise go to ER if symptoms worsen or fail to improve  with treatment.  Instructions, follow up, and supportive care as per AVS.  AVS provided and reviewed with patient including supportive care, follow up, and red flag symptoms.   Patient verbalizes understanding and agrees with treatment plan. Discharged from Urgent Care in stable condition.  You have tested negative for COVID-19 today. If you did not have any close exposure as defined below, then effective today, you can return to your normal daily activities including social distancing, wearing masks, and frequent handwashing.    DIAGNOSIS:  Cough  Morbid obesity  Flu like symptoms  Suspect COVID-19  COVID-19 exposure

## 2022-01-11 ENCOUNTER — OFFICE VISIT (OUTPATIENT)
Dept: INTERNAL MEDICINE | Facility: CLINIC | Age: 47
End: 2022-01-11
Payer: COMMERCIAL

## 2022-01-11 ENCOUNTER — PATIENT OUTREACH (OUTPATIENT)
Dept: ADMINISTRATIVE | Facility: OTHER | Age: 47
End: 2022-01-11
Payer: COMMERCIAL

## 2022-01-11 ENCOUNTER — PATIENT MESSAGE (OUTPATIENT)
Dept: INTERNAL MEDICINE | Facility: CLINIC | Age: 47
End: 2022-01-11
Payer: COMMERCIAL

## 2022-01-11 VITALS
TEMPERATURE: 97 F | WEIGHT: 315 LBS | DIASTOLIC BLOOD PRESSURE: 84 MMHG | HEART RATE: 61 BPM | BODY MASS INDEX: 48.22 KG/M2 | SYSTOLIC BLOOD PRESSURE: 120 MMHG | OXYGEN SATURATION: 99 %

## 2022-01-11 DIAGNOSIS — F51.01 PRIMARY INSOMNIA: ICD-10-CM

## 2022-01-11 DIAGNOSIS — F32.A DEPRESSION, UNSPECIFIED DEPRESSION TYPE: Primary | ICD-10-CM

## 2022-01-11 DIAGNOSIS — D35.2 PROLACTINOMA: ICD-10-CM

## 2022-01-11 PROCEDURE — 1159F PR MEDICATION LIST DOCUMENTED IN MEDICAL RECORD: ICD-10-PCS | Mod: CPTII,S$GLB,, | Performed by: INTERNAL MEDICINE

## 2022-01-11 PROCEDURE — 99999 PR PBB SHADOW E&M-EST. PATIENT-LVL IV: ICD-10-PCS | Mod: PBBFAC,,, | Performed by: INTERNAL MEDICINE

## 2022-01-11 PROCEDURE — 3008F PR BODY MASS INDEX (BMI) DOCUMENTED: ICD-10-PCS | Mod: CPTII,S$GLB,, | Performed by: INTERNAL MEDICINE

## 2022-01-11 PROCEDURE — 3074F PR MOST RECENT SYSTOLIC BLOOD PRESSURE < 130 MM HG: ICD-10-PCS | Mod: CPTII,S$GLB,, | Performed by: INTERNAL MEDICINE

## 2022-01-11 PROCEDURE — 3008F BODY MASS INDEX DOCD: CPT | Mod: CPTII,S$GLB,, | Performed by: INTERNAL MEDICINE

## 2022-01-11 PROCEDURE — 99213 OFFICE O/P EST LOW 20 MIN: CPT | Mod: S$GLB,,, | Performed by: INTERNAL MEDICINE

## 2022-01-11 PROCEDURE — 99213 PR OFFICE/OUTPT VISIT, EST, LEVL III, 20-29 MIN: ICD-10-PCS | Mod: S$GLB,,, | Performed by: INTERNAL MEDICINE

## 2022-01-11 PROCEDURE — 3079F DIAST BP 80-89 MM HG: CPT | Mod: CPTII,S$GLB,, | Performed by: INTERNAL MEDICINE

## 2022-01-11 PROCEDURE — 3074F SYST BP LT 130 MM HG: CPT | Mod: CPTII,S$GLB,, | Performed by: INTERNAL MEDICINE

## 2022-01-11 PROCEDURE — 1159F MED LIST DOCD IN RCRD: CPT | Mod: CPTII,S$GLB,, | Performed by: INTERNAL MEDICINE

## 2022-01-11 PROCEDURE — 3079F PR MOST RECENT DIASTOLIC BLOOD PRESSURE 80-89 MM HG: ICD-10-PCS | Mod: CPTII,S$GLB,, | Performed by: INTERNAL MEDICINE

## 2022-01-11 PROCEDURE — 99999 PR PBB SHADOW E&M-EST. PATIENT-LVL IV: CPT | Mod: PBBFAC,,, | Performed by: INTERNAL MEDICINE

## 2022-01-11 RX ORDER — FLUOXETINE 10 MG/1
10 CAPSULE ORAL DAILY
Qty: 90 CAPSULE | Refills: 0 | Status: SHIPPED | OUTPATIENT
Start: 2022-01-11 | End: 2023-02-20

## 2022-01-11 RX ORDER — CABERGOLINE 0.5 MG/1
0.5 TABLET ORAL
Qty: 10 TABLET | Refills: 11 | OUTPATIENT
Start: 2022-01-13 | End: 2023-01-13

## 2022-01-11 RX ORDER — ZOLPIDEM TARTRATE 10 MG/1
10 TABLET ORAL NIGHTLY PRN
Qty: 30 TABLET | Refills: 0 | OUTPATIENT
Start: 2022-01-11

## 2022-01-11 NOTE — TELEPHONE ENCOUNTER
Care Due:                  Date            Visit Type   Department     Provider  --------------------------------------------------------------------------------                                [facility]                              VIRTUAL      ON INTERNAL  Last Visit: 03-      VISIT        MEDICINE       Crista  Rinaldi                                           ON INTERNAL  Next Visit: 04-      None         MEDICINE       Crista  Rinaldi                                                            Last  Test          Frequency    Reason                     Performed    Due Date  --------------------------------------------------------------------------------    Office Visit  12 months..  FLUoxetine, amitriptyline  03- 03-    Powered by [email or web address]. Reference number: [id number].   1/11/2022 8:12:07 AM CST

## 2022-01-11 NOTE — PROGRESS NOTES
Subjective:      Patient ID: Dylan Ariza is a 46 y.o. male.    Chief Complaint: Annual Exam    HPI     47 yo with   Patient Active Problem List   Diagnosis    Morbid obesity    Gastroesophageal reflux disease without esophagitis    Intestinal malabsorption    Abnormal weight gain    Testosterone deficiency    Insulin resistance    Mixed hyperlipidemia    Back pain    Chronic lower back pain    Cough    Dyspnea on exertion    Globus sensation    Heartburn    Joint pain    Central sleep apnea due to medical condition     Past Medical History:   Diagnosis Date    Chronic back pain     Chronic cough     GERD (gastroesophageal reflux disease)     Globus sensation     Insulin resistance     Mixed hyperlipidemia 12/13/2018    Testosterone deficiency        Pt of Dr. Rinaldi.     Depression symptoms. 2mo.   Feeling down. Decreased motivation. Anhedonia.   Splitting with girlfriend. Bought a home together.   No SH/I. No av/h  Denies h/o mood d/o and psyc meds.   Denies f/h of mood d/o.    Review of Systems   Constitutional: Negative for chills and fever.   Cardiovascular: Negative for chest pain and palpitations.   Psychiatric/Behavioral: Negative for hallucinations and suicidal ideas.     Objective:   /84 (BP Location: Left arm, Patient Position: Sitting, BP Method: Large (Manual))   Pulse 61   Temp 97.1 °F (36.2 °C) (Tympanic)   Wt (!) 148.1 kg (326 lb 8 oz)   SpO2 99%   BMI 48.22 kg/m²     Physical Exam  Constitutional:       General: He is awake. He is not in acute distress.     Appearance: Normal appearance. He is well-developed and well-nourished. He is not ill-appearing, toxic-appearing or diaphoretic.   HENT:      Head: Normocephalic and atraumatic.   Eyes:      Conjunctiva/sclera: Conjunctivae normal.   Pulmonary:      Effort: Pulmonary effort is normal.   Musculoskeletal:      Cervical back: No rigidity.   Skin:     General: Skin is dry.   Neurological:      Mental Status: He  is alert.   Psychiatric:         Mood and Affect: Mood and affect and mood normal.         Behavior: Behavior normal. Behavior is cooperative.         Assessment:     1. Depression, unspecified depression type      Plan:   Depression, unspecified depression type  -     Ambulatory referral/consult to Psychiatry; Future; Expected date: 01/18/2022  -     FLUoxetine 10 MG capsule; Take 1 capsule (10 mg total) by mouth once daily.  Dispense: 90 capsule; Refill: 0            Problem List Items Addressed This Visit    None     Visit Diagnoses     Depression, unspecified depression type    -  Primary    Relevant Medications    FLUoxetine 10 MG capsule    Other Relevant Orders    Ambulatory referral/consult to Psychiatry          Follow up if symptoms worsen or fail to improve.  Needs appt in 3 months with PCP.

## 2022-01-11 NOTE — TELEPHONE ENCOUNTER
Contacted pt to offer him an appointment with the NP due to PCP being out of the office. Pt refused appt and ended the call abruptly.

## 2022-01-11 NOTE — PROGRESS NOTES
Health Maintenance Due   Topic Date Due    Hepatitis C Screening  Never done    COVID-19 Vaccine (1) Never done    Pneumococcal Vaccines (Age 0-64) (1 of 2 - PPSV23) Never done    TETANUS VACCINE  Never done    Influenza Vaccine (1) 09/01/2021     Updates were requested from care everywhere.  Chart was reviewed for overdue Proactive [facility] Encounters (EDIN) topics (CRS, Breast Cancer Screening, Eye exam)  Health Maintenance has been updated.  LINKS immunization registry triggered.  Immunizations were reconciled.

## 2022-01-12 DIAGNOSIS — F32.A DEPRESSION, UNSPECIFIED DEPRESSION TYPE: Primary | ICD-10-CM

## 2022-01-13 ENCOUNTER — PATIENT MESSAGE (OUTPATIENT)
Dept: SPORTS MEDICINE | Facility: CLINIC | Age: 47
End: 2022-01-13
Payer: COMMERCIAL

## 2022-01-14 ENCOUNTER — TELEPHONE (OUTPATIENT)
Dept: NEUROLOGY | Facility: CLINIC | Age: 47
End: 2022-01-14
Payer: COMMERCIAL

## 2022-01-14 ENCOUNTER — TELEPHONE (OUTPATIENT)
Dept: SPORTS MEDICINE | Facility: CLINIC | Age: 47
End: 2022-01-14
Payer: COMMERCIAL

## 2022-01-14 NOTE — TELEPHONE ENCOUNTER
----- Message from Bella Amezcua sent at 1/14/2022  9:43 AM CST -----  Regarding: Prescription Refill  Mr Ariza scheduled with Dr. Sumi Tao's office in [facility] for a refill on medication for a tumor in his head.  We notified patient that he scheduled incorrectly and he didn't know how to get in touch with staff in neurology to make an appointment.  Neurosurgery saw him back in 2020.  Can you reach out to patient to see if you can direct him in the right steps to get his prescription.    Thanks

## 2022-01-14 NOTE — TELEPHONE ENCOUNTER
LVM that patient is scheduled with the wrong provider.  Dr. Tao is in the orthopedic department and does not do general medication refills. Informed patient that 10 am appointment will be cancelled and he should follow up with his PCP.

## 2022-01-24 ENCOUNTER — OFFICE VISIT (OUTPATIENT)
Dept: NEUROSURGERY | Facility: CLINIC | Age: 47
End: 2022-01-24
Payer: COMMERCIAL

## 2022-01-24 DIAGNOSIS — D35.2 PROLACTIN-SECRETING PITUITARY ADENOMA: Primary | ICD-10-CM

## 2022-01-24 DIAGNOSIS — E66.01 MORBID OBESITY: ICD-10-CM

## 2022-01-24 DIAGNOSIS — D35.2 PROLACTINOMA: ICD-10-CM

## 2022-01-24 DIAGNOSIS — G89.29 CHRONIC BILATERAL LOW BACK PAIN WITHOUT SCIATICA: ICD-10-CM

## 2022-01-24 DIAGNOSIS — E34.9 TESTOSTERONE DEFICIENCY: ICD-10-CM

## 2022-01-24 DIAGNOSIS — M54.50 CHRONIC BILATERAL LOW BACK PAIN WITHOUT SCIATICA: ICD-10-CM

## 2022-01-24 DIAGNOSIS — E23.0 PITUITARY INSUFFICIENCY: ICD-10-CM

## 2022-01-24 PROCEDURE — 99214 OFFICE O/P EST MOD 30 MIN: CPT | Mod: S$GLB,,, | Performed by: NEUROLOGICAL SURGERY

## 2022-01-24 PROCEDURE — 1159F MED LIST DOCD IN RCRD: CPT | Mod: CPTII,S$GLB,, | Performed by: NEUROLOGICAL SURGERY

## 2022-01-24 PROCEDURE — 1159F PR MEDICATION LIST DOCUMENTED IN MEDICAL RECORD: ICD-10-PCS | Mod: CPTII,S$GLB,, | Performed by: NEUROLOGICAL SURGERY

## 2022-01-24 PROCEDURE — 99214 PR OFFICE/OUTPT VISIT, EST, LEVL IV, 30-39 MIN: ICD-10-PCS | Mod: S$GLB,,, | Performed by: NEUROLOGICAL SURGERY

## 2022-01-24 PROCEDURE — 99999 PR PBB SHADOW E&M-EST. PATIENT-LVL III: ICD-10-PCS | Mod: PBBFAC,,, | Performed by: NEUROLOGICAL SURGERY

## 2022-01-24 PROCEDURE — 1160F RVW MEDS BY RX/DR IN RCRD: CPT | Mod: CPTII,S$GLB,, | Performed by: NEUROLOGICAL SURGERY

## 2022-01-24 PROCEDURE — 99999 PR PBB SHADOW E&M-EST. PATIENT-LVL III: CPT | Mod: PBBFAC,,, | Performed by: NEUROLOGICAL SURGERY

## 2022-01-24 PROCEDURE — 1160F PR REVIEW ALL MEDS BY PRESCRIBER/CLIN PHARMACIST DOCUMENTED: ICD-10-PCS | Mod: CPTII,S$GLB,, | Performed by: NEUROLOGICAL SURGERY

## 2022-01-24 RX ORDER — CABERGOLINE 0.5 MG/1
0.5 TABLET ORAL
Qty: 10 TABLET | Refills: 11 | Status: SHIPPED | OUTPATIENT
Start: 2022-01-24 | End: 2023-01-24

## 2022-01-24 RX ORDER — CABERGOLINE 0.5 MG/1
0.5 TABLET ORAL
Qty: 102 TABLET | Refills: 0 | Status: SHIPPED | OUTPATIENT
Start: 2022-01-24 | End: 2024-01-16

## 2022-01-24 NOTE — PROGRESS NOTES
Subjective:      Patient ID: Dylan Ariza is a 46 y.o. male.    Chief Complaint: No chief complaint on file.  Prolactinoma    HPI or 46-year-old male previously diagnosed with a prolactinoma was being treated with Dostinex which was controlling his tumor but had to stop taking his medication as he has insurance was canceled.  Patient now has insurance again would like to get a refill on his medication.  He currently denies any visual complaints no double vision no blurred vision no knee numbness tingling or weakness.  Patient denies any fevers or chills has had some weight gain and has a history of chronic lower back pain which is stable at this time but complaining of arthralgia    Review of Systems   Constitutional: Negative for activity change, chills and fever.   HENT: Negative for hearing loss, rhinorrhea and tinnitus.    Eyes: Negative for photophobia.   Respiratory: Negative for apnea and chest tightness.    Cardiovascular: Negative for chest pain and leg swelling.   Gastrointestinal: Negative for abdominal distention and abdominal pain.   Endocrine: Negative for cold intolerance, heat intolerance and polydipsia.   Genitourinary: Negative.    Musculoskeletal: Positive for arthralgias, joint swelling and myalgias.   Skin: Negative for color change.   Neurological: Negative for dizziness, weakness, numbness and headaches.   Hematological: Negative.    Psychiatric/Behavioral: Negative.    All other systems reviewed and are negative.          Objective:       Physical Exam:  Nursing note and vitals reviewed.    Cardiovascular: Regular rhythm.     Abdominal: Soft. Bowel sounds are normal.     Psych/Behavior: He is alert. He is oriented to person, place, and time.     Musculoskeletal:        Neck: There is no tenderness.     Neurological:   Visual fields were full to confrontation     General    Nursing note and vitals reviewed.  Constitutional: He is oriented to person, place, and time. He is easily  aroused.   HENT:   Head: Normocephalic and atraumatic. There is normal jaw occlusion.   Neck: Trachea normal.   Cardiovascular: Regular rhythm and normal heart sounds.    Pulmonary/Chest: Effort normal and breath sounds normal.   Abdominal: Soft. Bowel sounds are normal.   Lymphadenopathy:     He has no cervical adenopathy.   Neurological: He is alert, oriented to person, place, and time and easily aroused. He has normal motor skills, normal sensation and intact cranial nerves. Coordination normal. GCS eye subscore is 4. GCS verbal subscore is 5. GCS motor subscore is 6.   Visual fields were full to confrontation   Psychiatric: His speech is normal and behavior is normal. Mood normal.             Physical Exam  Vitals and nursing note reviewed.   Constitutional:       General: He is awake.      Appearance: He is morbidly obese.   HENT:      Head: Normocephalic and atraumatic.      Jaw: There is normal jaw occlusion.   Eyes:      General: Vision grossly intact.      Extraocular Movements: Extraocular movements intact.   Neck:      Trachea: Trachea normal.      Meningeal: Brudzinski's sign absent.   Cardiovascular:      Rate and Rhythm: Regular rhythm.      Heart sounds: Normal heart sounds.   Pulmonary:      Effort: Pulmonary effort is normal.      Breath sounds: Normal breath sounds.   Abdominal:      General: Abdomen is protuberant. Bowel sounds are normal.      Palpations: Abdomen is soft.   Musculoskeletal:      Cervical back: Normal and normal range of motion. No swelling, torticollis, tenderness or bony tenderness.      Thoracic back: No deformity. No scoliosis.      Lumbar back: Tenderness present. No deformity or bony tenderness.   Lymphadenopathy:      Cervical: No cervical adenopathy.   Skin:     General: Skin is warm and moist.   Neurological:      General: No focal deficit present.      Mental Status: He is alert, oriented to person, place, and time and easily aroused.      GCS: GCS eye subscore is 4. GCS  verbal subscore is 5. GCS motor subscore is 6.      Cranial Nerves: Cranial nerves are intact.      Sensory: Sensation is intact.      Motor: Motor function is intact.      Coordination: Coordination is intact.      Comments: Visual fields were full to confrontation   Psychiatric:         Attention and Perception: Attention and perception normal.         Mood and Affect: Mood normal.         Speech: Speech normal.         Behavior: Behavior normal.                   Imaging:  I reviewed patient's previous brain MRI from 2020 which revealed a large suprasellar tumor with mild suprasellar extension no compression of the optic nerves  I have personally reviewed the patients neuroimaging. The above description is a summary of pertinant findings, but is not all inclusive. Additional information maybe found in the radiology interpretation. These findings will be discussed with the patient or other revelant members of the care team.   I  reviewed all pertinent imaging regarding this case.  Assessment:   1. 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  Patient would like to continue treating his tumor with medication we will renew his Dostinex.  2. Pituitary insufficiency    3. Chronic lower back pain  4. Obesity   1. Prolactin-secreting pituitary adenoma    2. Morbid obesity    3. Testosterone deficiency    4. Chronic bilateral low back pain without sciatica    5. Prolactinoma    6. Pituitary insufficiency      Plan:   1. Suprasellar prolactinoma continue treating with Dostinex.  Will repeat brain MRI with contrast to evaluate current status of the tumor I see the patient back after completion of his MRI.  2. Pituitary insufficiency recommend consult Endocrinology for further care and management.  3. Chronic lower back pain without sciatica continue treating conservatively with anti-inflammatories for lower back and joint pain.  No tumor surgery is indicated at this  time will continue to follow his progress    Prolactin-secreting pituitary adenoma  -     MRI Brain [facility] W/WO CONTRAST; Future; Expected date: 01/24/2022    Morbid obesity  -     MRI Brain [facility] W/WO CONTRAST; Future; Expected date: 01/24/2022    Testosterone deficiency  -     MRI Brain [facility] W/WO CONTRAST; Future; Expected date: 01/24/2022    Chronic bilateral low back pain without sciatica  -     MRI Brain [facility] W/WO CONTRAST; Future; Expected date: 01/24/2022    Prolactinoma  -     cabergoline (DOSTINEX) 0.5 mg tablet; Take 1 tablet (0.5 mg total) by mouth twice a week. To take Tuesdays and Fridays  Dispense: 10 tablet; Refill: 11  -     MRI Brain [facility] W/WO CONTRAST; Future; Expected date: 01/24/2022    Pituitary insufficiency        More than 45 minutes of the time spent in counseling and coordination of care including discussions etiology of diagnosis, pathogenesis of diagnosis, prognosis of diagnosis,, diagnostic results, impression and recommendations, diagnostic studies, management, risks and benefits of treatment, instructions of disease self-management, treatment instructions, follow up requirements, patient and family counseling/involvement in care compliance with treatment regimen. All of the patient's and/or family members questions were answered during this discussion.       Disclaimer: This note was prepared using a voice recognition system and is likely to have sound alike errors within the text.     Thank you for the referral   Please call with any questions    Sheldon Li MD  Neurosurgery

## 2022-02-21 ENCOUNTER — PATIENT OUTREACH (OUTPATIENT)
Dept: ADMINISTRATIVE | Facility: OTHER | Age: 47
End: 2022-02-21
Payer: COMMERCIAL

## 2022-05-09 ENCOUNTER — PATIENT MESSAGE (OUTPATIENT)
Dept: SMOKING CESSATION | Facility: CLINIC | Age: 47
End: 2022-05-09
Payer: COMMERCIAL

## 2023-02-20 ENCOUNTER — HOSPITAL ENCOUNTER (OUTPATIENT)
Dept: RADIOLOGY | Facility: HOSPITAL | Age: 48
Discharge: HOME | End: 2023-02-20
Attending: PODIATRIST
Payer: COMMERCIAL

## 2023-02-20 ENCOUNTER — OFFICE VISIT (OUTPATIENT)
Dept: PODIATRY | Facility: CLINIC | Age: 48
End: 2023-02-20
Payer: COMMERCIAL

## 2023-02-20 VITALS — WEIGHT: 315 LBS | BODY MASS INDEX: 46.65 KG/M2 | HEIGHT: 69 IN

## 2023-02-20 DIAGNOSIS — E66.01 MORBID OBESITY: ICD-10-CM

## 2023-02-20 DIAGNOSIS — M79.671 PAIN IN RIGHT FOOT: Primary | ICD-10-CM

## 2023-02-20 DIAGNOSIS — M79.671 PAIN IN RIGHT FOOT: ICD-10-CM

## 2023-02-20 PROCEDURE — 73630 X-RAY EXAM OF FOOT: CPT | Mod: TC,RT

## 2023-02-20 PROCEDURE — 99999 PR PBB SHADOW E&M-EST. PATIENT-LVL III: ICD-10-PCS | Mod: PBBFAC,,, | Performed by: PODIATRIST

## 2023-02-20 PROCEDURE — 73630 XR FOOT COMPLETE 3 VIEW RIGHT: ICD-10-PCS | Mod: 26,RT,, | Performed by: RADIOLOGY

## 2023-02-20 PROCEDURE — 99999 PR PBB SHADOW E&M-EST. PATIENT-LVL III: CPT | Mod: PBBFAC,,, | Performed by: PODIATRIST

## 2023-02-20 PROCEDURE — 99214 PR OFFICE/OUTPT VISIT, EST, LEVL IV, 30-39 MIN: ICD-10-PCS | Mod: S$GLB,,, | Performed by: PODIATRIST

## 2023-02-20 PROCEDURE — 73630 X-RAY EXAM OF FOOT: CPT | Mod: 26,RT,, | Performed by: RADIOLOGY

## 2023-02-20 PROCEDURE — 99214 OFFICE O/P EST MOD 30 MIN: CPT | Mod: S$GLB,,, | Performed by: PODIATRIST

## 2023-02-20 RX ORDER — TRAMADOL HYDROCHLORIDE 50 MG/1
TABLET ORAL
Qty: 28 TABLET | Refills: 0 | Status: SHIPPED | OUTPATIENT
Start: 2023-02-20 | End: 2023-02-22

## 2023-02-20 RX ORDER — IBUPROFEN 800 MG/1
800 TABLET ORAL 2 TIMES DAILY
Qty: 60 TABLET | Refills: 1 | Status: SHIPPED | OUTPATIENT
Start: 2023-02-20 | End: 2023-02-22

## 2023-02-20 NOTE — PROGRESS NOTES
Subjective:       Patient ID: Dylan Ariza is a 47 y.o. male.    Chief Complaint: Foot Pain (Right ft 3/10 Pain Non Diabetic PCP:Crista Rinaldi MD last seen with Billy Pappas MD on 2022  )      HPI: Dylan Ariza presents to the clinic today for evaluation concerning stated mild pains to the right foot/ankle at the lateral midfoot. Patient states pains are approx. 3/10. Pains are described as achy. Pains have been present for duration of several weeks. Patient states the pains are exacerbated with walking and standing and prolonged activities. States no prior medical evaluation by a MD/DO/DPM/NP. States no NSAIDs. Trauma is not stated.  Patient's Primary Care Provider is Crista Rinaldi MD. States pains have improved over the past few days. States rolling his ankle.    Review of patient's allergies indicates:  No Known Allergies    Past Medical History:   Diagnosis Date    Chronic back pain     Chronic cough     GERD (gastroesophageal reflux disease)     Globus sensation     Insulin resistance     Mixed hyperlipidemia 2018    Testosterone deficiency        Family History   Problem Relation Age of Onset    Heart disease Father     Hypertension Father     Stroke Father     Heart failure Father     Cancer Paternal Grandmother     Schizophrenia Mother     Diabetes Neg Hx        Social History     Socioeconomic History    Marital status: Single   Tobacco Use    Smoking status: Light Smoker     Packs/day: 0.00     Years: 20.00     Pack years: 0.00     Types: Cigars, Cigarettes     Last attempt to quit: 2020     Years since quittin.9    Smokeless tobacco: Never    Tobacco comments:     smoked 5 cigars/week   Substance and Sexual Activity    Alcohol use: Yes     Alcohol/week: 4.0 standard drinks     Types: 4 Shots of liquor per week     Comment: 2 pints/ weekend    Drug use: No    Sexual activity: Not Currently     Partners: Female   Social History Narrative    Live w/ girlfriend  "       Past Surgical History:   Procedure Laterality Date    SLEEVE GASTROPLASTY         Review of Systems   Constitutional:  Negative for chills, fatigue and fever.   HENT:  Negative for hearing loss.    Eyes:  Negative for photophobia and visual disturbance.   Respiratory:  Negative for cough, chest tightness, shortness of breath and wheezing.    Cardiovascular:  Negative for chest pain and palpitations.   Gastrointestinal:  Negative for constipation, diarrhea, nausea and vomiting.   Endocrine: Negative for cold intolerance and heat intolerance.   Genitourinary:  Negative for flank pain.   Musculoskeletal:  Positive for gait problem. Negative for neck pain and neck stiffness.   Neurological:  Negative for light-headedness and headaches.   Psychiatric/Behavioral:  Negative for sleep disturbance.        Objective:   Ht 5' 9" (1.753 m)   Wt (!) 148.1 kg (326 lb 8 oz)   BMI 48.22 kg/m²     Physical Exam  LOWER EXTREMITY PHYSICAL EXAMINATION  DERMATOLOGY: Skin is supple, dry and intact. No ecchymosis is noted, lateral ankle and foot.    ORTHOPEDIC: MMT is 5/5 on the RLE as compared to the contra-lateral. There is no edema is noted at the lateral foot and ankle. Mild pains to the 5th metatarsal base. Mild 5th metatarsal diaphyseal pains. Antalgic gait is noted. No PB or PL pathology is noted.    Assessment:     1. Pain in right foot    2. Morbid obesity          Plan:     Pain in right foot  -     X-Ray Foot Complete Right; Future; Expected date: 02/20/2023  -     ibuprofen (ADVIL,MOTRIN) 800 MG tablet; Take 1 tablet (800 mg total) by mouth 2 (two) times daily.  Dispense: 60 tablet; Refill: 1  -     traMADoL (ULTRAM) 50 mg tablet; Take 1 to 2 tablets by mouth, every 6 hours as needed for pain relief.  Dispense: 28 tablet; Refill: 0    Morbid obesity    Thorough discussion is had with the patient today, concerning the diagnosis, its etiology, and the treatment algorithm at present.     Please obtain XR.    Start oral " NSAIDs BID for 10-14 days.    Patient is counseled and reminded concerning the importance of good nutrition and healthy eating habits, which may include blood sugar control, to prevent and/or help podiatric foot and ankle complications.          No future appointments.

## 2023-02-22 ENCOUNTER — PATIENT MESSAGE (OUTPATIENT)
Dept: PODIATRY | Facility: CLINIC | Age: 48
End: 2023-02-22
Payer: COMMERCIAL

## 2023-02-22 DIAGNOSIS — M79.671 PAIN IN RIGHT FOOT: ICD-10-CM

## 2023-02-22 RX ORDER — IBUPROFEN 800 MG/1
800 TABLET ORAL 2 TIMES DAILY
Qty: 60 TABLET | Refills: 1 | Status: SHIPPED | OUTPATIENT
Start: 2023-02-22 | End: 2024-01-17

## 2023-02-22 RX ORDER — TRAMADOL HYDROCHLORIDE 50 MG/1
TABLET ORAL
Qty: 28 TABLET | Refills: 0 | Status: SHIPPED | OUTPATIENT
Start: 2023-02-22 | End: 2023-03-31

## 2023-03-31 ENCOUNTER — OFFICE VISIT (OUTPATIENT)
Dept: INTERNAL MEDICINE | Facility: CLINIC | Age: 48
End: 2023-03-31
Payer: COMMERCIAL

## 2023-03-31 ENCOUNTER — LAB VISIT (OUTPATIENT)
Dept: LAB | Facility: HOSPITAL | Age: 48
End: 2023-03-31
Attending: FAMILY MEDICINE
Payer: COMMERCIAL

## 2023-03-31 VITALS
OXYGEN SATURATION: 95 % | WEIGHT: 315 LBS | SYSTOLIC BLOOD PRESSURE: 136 MMHG | BODY MASS INDEX: 46.65 KG/M2 | HEART RATE: 107 BPM | HEIGHT: 69 IN | RESPIRATION RATE: 18 BRPM | TEMPERATURE: 97 F | DIASTOLIC BLOOD PRESSURE: 84 MMHG

## 2023-03-31 DIAGNOSIS — Z11.59 ENCOUNTER FOR HEPATITIS C SCREENING TEST FOR LOW RISK PATIENT: ICD-10-CM

## 2023-03-31 DIAGNOSIS — Z00.00 ROUTINE PHYSICAL EXAMINATION: Primary | ICD-10-CM

## 2023-03-31 DIAGNOSIS — Z00.00 ROUTINE PHYSICAL EXAMINATION: ICD-10-CM

## 2023-03-31 LAB
ALBUMIN SERPL BCP-MCNC: 3.8 G/DL (ref 3.5–5.2)
ALP SERPL-CCNC: 91 U/L (ref 55–135)
ALT SERPL W/O P-5'-P-CCNC: 30 U/L (ref 10–44)
ANION GAP SERPL CALC-SCNC: 10 MMOL/L (ref 8–16)
AST SERPL-CCNC: 28 U/L (ref 10–40)
BASOPHILS # BLD AUTO: 0.06 K/UL (ref 0–0.2)
BASOPHILS NFR BLD: 0.9 % (ref 0–1.9)
BILIRUB SERPL-MCNC: 0.4 MG/DL (ref 0.1–1)
BUN SERPL-MCNC: 16 MG/DL (ref 6–20)
CALCIUM SERPL-MCNC: 9.8 MG/DL (ref 8.7–10.5)
CHLORIDE SERPL-SCNC: 105 MMOL/L (ref 95–110)
CHOLEST SERPL-MCNC: 257 MG/DL (ref 120–199)
CHOLEST/HDLC SERPL: 6.4 {RATIO} (ref 2–5)
CO2 SERPL-SCNC: 26 MMOL/L (ref 23–29)
CREAT SERPL-MCNC: 0.9 MG/DL (ref 0.5–1.4)
DIFFERENTIAL METHOD: ABNORMAL
EOSINOPHIL # BLD AUTO: 0.2 K/UL (ref 0–0.5)
EOSINOPHIL NFR BLD: 3.8 % (ref 0–8)
ERYTHROCYTE [DISTWIDTH] IN BLOOD BY AUTOMATED COUNT: 13.5 % (ref 11.5–14.5)
EST. GFR  (NO RACE VARIABLE): >60 ML/MIN/1.73 M^2
ESTIMATED AVG GLUCOSE: 117 MG/DL (ref 68–131)
GLUCOSE SERPL-MCNC: 90 MG/DL (ref 70–110)
HBA1C MFR BLD: 5.7 % (ref 4–5.6)
HCT VFR BLD AUTO: 38.6 % (ref 40–54)
HCV AB SERPL QL IA: NORMAL
HDLC SERPL-MCNC: 40 MG/DL (ref 40–75)
HDLC SERPL: 15.6 % (ref 20–50)
HGB BLD-MCNC: 12.3 G/DL (ref 14–18)
IMM GRANULOCYTES # BLD AUTO: 0.01 K/UL (ref 0–0.04)
IMM GRANULOCYTES NFR BLD AUTO: 0.2 % (ref 0–0.5)
LDLC SERPL CALC-MCNC: 191 MG/DL (ref 63–159)
LYMPHOCYTES # BLD AUTO: 2.7 K/UL (ref 1–4.8)
LYMPHOCYTES NFR BLD: 42 % (ref 18–48)
MCH RBC QN AUTO: 28.6 PG (ref 27–31)
MCHC RBC AUTO-ENTMCNC: 31.9 G/DL (ref 32–36)
MCV RBC AUTO: 90 FL (ref 82–98)
MONOCYTES # BLD AUTO: 0.7 K/UL (ref 0.3–1)
MONOCYTES NFR BLD: 10.3 % (ref 4–15)
NEUTROPHILS # BLD AUTO: 2.7 K/UL (ref 1.8–7.7)
NEUTROPHILS NFR BLD: 42.8 % (ref 38–73)
NONHDLC SERPL-MCNC: 217 MG/DL
NRBC BLD-RTO: 0 /100 WBC
PLATELET # BLD AUTO: 214 K/UL (ref 150–450)
PMV BLD AUTO: 12.5 FL (ref 9.2–12.9)
POTASSIUM SERPL-SCNC: 4.6 MMOL/L (ref 3.5–5.1)
PROT SERPL-MCNC: 8.2 G/DL (ref 6–8.4)
RBC # BLD AUTO: 4.3 M/UL (ref 4.6–6.2)
SODIUM SERPL-SCNC: 141 MMOL/L (ref 136–145)
TRIGL SERPL-MCNC: 130 MG/DL (ref 30–150)
WBC # BLD AUTO: 6.33 K/UL (ref 3.9–12.7)

## 2023-03-31 PROCEDURE — 99999 PR PBB SHADOW E&M-EST. PATIENT-LVL IV: ICD-10-PCS | Mod: PBBFAC,,, | Performed by: FAMILY MEDICINE

## 2023-03-31 PROCEDURE — 83036 HEMOGLOBIN GLYCOSYLATED A1C: CPT | Performed by: FAMILY MEDICINE

## 2023-03-31 PROCEDURE — 36415 COLL VENOUS BLD VENIPUNCTURE: CPT | Performed by: FAMILY MEDICINE

## 2023-03-31 PROCEDURE — 99999 PR PBB SHADOW E&M-EST. PATIENT-LVL IV: CPT | Mod: PBBFAC,,, | Performed by: FAMILY MEDICINE

## 2023-03-31 PROCEDURE — 85025 COMPLETE CBC W/AUTO DIFF WBC: CPT | Performed by: FAMILY MEDICINE

## 2023-03-31 PROCEDURE — 99396 PR PREVENTIVE VISIT,EST,40-64: ICD-10-PCS | Mod: S$GLB,,, | Performed by: FAMILY MEDICINE

## 2023-03-31 PROCEDURE — 86803 HEPATITIS C AB TEST: CPT | Performed by: FAMILY MEDICINE

## 2023-03-31 PROCEDURE — 80061 LIPID PANEL: CPT | Performed by: FAMILY MEDICINE

## 2023-03-31 PROCEDURE — 80053 COMPREHEN METABOLIC PANEL: CPT | Performed by: FAMILY MEDICINE

## 2023-03-31 PROCEDURE — 99396 PREV VISIT EST AGE 40-64: CPT | Mod: S$GLB,,, | Performed by: FAMILY MEDICINE

## 2023-03-31 RX ORDER — ERGOCALCIFEROL 1.25 MG/1
50000 CAPSULE ORAL
Qty: 12 CAPSULE | Refills: 1 | Status: SHIPPED | OUTPATIENT
Start: 2023-03-31 | End: 2024-01-16

## 2023-03-31 NOTE — PROGRESS NOTES
Dylan Ariza  03/31/2023  [id number]    Crista Rinaldi MD  Patient Care Team:  Crista Rinaldi MD as PCP - General (Internal Medicine)  Mason Orozco MD as Consulting Physician (Gastroenterology)    Has the patient seen any provider outside of the network since the last visit ? (no). If yes, HIPPA forms completed and records requested.      Visit Type:a scheduled routine follow-up visit    Chief Complaint:  Chief Complaint   Patient presents with    Annual Exam       History of Present Illness:  HPI  Mr Ariza presents today for his annual exam.     HM reviewed     No complaint today     Medical history reviewed      Review of Systems   HENT:  Negative for hearing loss.    Eyes:  Negative for discharge.   Respiratory:  Negative for wheezing.    Cardiovascular:  Negative for chest pain and palpitations.   Gastrointestinal:  Negative for blood in stool, constipation, diarrhea and vomiting.   Genitourinary:  Negative for hematuria and urgency.   Musculoskeletal:  Negative for neck pain.   Neurological:  Negative for weakness and headaches.   Endo/Heme/Allergies:  Negative for polydipsia.       Screening Questionnaires:    In the last two weeks how often have you felt down, depressed, or hopeless ( no )    In the last two weeks how often have you had little interest or pleasure in doing  (no )    In the last two weeks how often have you been bothered by the following problems:  1. Feeling nervous, anxious, or on edge ( no )    2. Not being able to stop or control worrying ( no)    3. Worrying too much about different things ( no)    4. Trouble relaxing ( no )    5. Being so restless that it is hard to sit still  (no )    6. Becoming easily annoyed or irritable (no)    7. Feeling afraid as if something awful might happen (no )    How often do you have a drink containing Alcohol? occasional, social use     Do you exercise  (yes ) moderately active    Do you take a baby Aspirin daily ( no)    Do you have an  advance directive ( no ) The patient was given information regarding Living Will/Durable Power-of- if requested.     The following were reviewed: Active problem list, medication list, allergies, family history, social history, and Health Maintenance.     History:  Past Medical History:   Diagnosis Date    Chronic back pain     Chronic cough     GERD (gastroesophageal reflux disease)     Globus sensation     Insulin resistance     Mixed hyperlipidemia 12/13/2018    Testosterone deficiency      Past Surgical History:   Procedure Laterality Date    SLEEVE GASTROPLASTY       Family History   Problem Relation Age of Onset    Heart disease Father     Hypertension Father     Stroke Father     Heart failure Father     Cancer Paternal Grandmother     Schizophrenia Mother     Diabetes Neg Hx      Social History     Socioeconomic History    Marital status: Single   Tobacco Use    Smoking status: Light Smoker     Packs/day: 0.00     Years: 20.00     Pack years: 0.00     Types: Cigars, Cigarettes     Last attempt to quit: 2/27/2020     Years since quitting: 3.0    Smokeless tobacco: Never    Tobacco comments:     smoked 5 cigars/week   Substance and Sexual Activity    Alcohol use: Yes     Alcohol/week: 4.0 standard drinks     Types: 4 Shots of liquor per week     Comment: 2 pints/ weekend    Drug use: No    Sexual activity: Not Currently     Partners: Female   Social History Narrative    Live w/ girlfriend      Social Determinants of Health     Financial Resource Strain: Low Risk     Difficulty of Paying Living Expenses: Not hard at all   Food Insecurity: No Food Insecurity    Worried About Running Out of Food in the Last Year: Never true    Ran Out of Food in the Last Year: Never true   Transportation Needs: No Transportation Needs    Lack of Transportation (Medical): No    Lack of Transportation (Non-Medical): No   Physical Activity: Sufficiently Active    Days of Exercise per Week: 5 days    Minutes of Exercise per  Session: 60 min   Stress: No Stress Concern Present    Feeling of Stress : Not at all   Social Connections: Unknown    Frequency of Communication with Friends and Family: More than three times a week    Frequency of Social Gatherings with Friends and Family: Once a week    Active Member of Clubs or Organizations: Yes    Attends Club or Organization Meetings: More than 4 times per year    Marital Status: Never    Housing Stability: Low Risk     Unable to Pay for Housing in the Last Year: No    Number of Places Lived in the Last Year: 1    Unstable Housing in the Last Year: No     Patient Active Problem List   Diagnosis    Morbid obesity    Gastroesophageal reflux disease without esophagitis    Intestinal malabsorption    Abnormal weight gain    Testosterone deficiency    Insulin resistance    Mixed hyperlipidemia    Back pain    Chronic lower back pain    Cough    Dyspnea on exertion    Globus sensation    Heartburn    Joint pain    Central sleep apnea due to medical condition    Prolactin-secreting pituitary adenoma     Review of patient's allergies indicates:  No Known Allergies    Health Maintenance  Health Maintenance Topics with due status: Not Due       Topic Last Completion Date    Colorectal Cancer Screening 12/05/2018    Lipid Panel 08/23/2019     Health Maintenance Due   Topic Date Due    Hepatitis C Screening  Never done    COVID-19 Vaccine (1) Never done    Pneumococcal Vaccines (Age 0-64) (1 - PCV) Never done    TETANUS VACCINE  Never done    Influenza Vaccine (1) 09/01/2022    Hemoglobin A1c (Diabetic Prevention Screening)  03/13/2023       Medications:  Current Outpatient Medications on File Prior to Visit   Medication Sig Dispense Refill    cabergoline (DOSTINEX) 0.5 mg tablet Take 1 tablet (0.5 mg total) by mouth twice a week. 102 tablet 0    [DISCONTINUED] traMADoL (ULTRAM) 50 mg tablet Take 1 to 2 tablets by mouth, every 6 hours as needed for pain relief. (Patient not taking: Reported on  3/31/2023) 28 tablet 0     No current facility-administered medications on file prior to visit.       Medications have been reviewed and reconciled with patient at visit today.    Barriers to medications present (no )    Adverse reactions to current medications (no)    Over the counter medications reviewed (Yes) and if needed added to active Medication list.    Exam:  Vitals:    03/31/23 1308   BP: 136/84   Pulse: 107   Resp: 18   Temp: 96.8 °F (36 °C)     Weight: (!) 153.8 kg (339 lb 1.1 oz)   Body mass index is 50.07 kg/m².      Physical Exam  Vitals and nursing note reviewed.   Constitutional:       Appearance: Normal appearance. He is obese. He is not ill-appearing.   HENT:      Head: Normocephalic and atraumatic.      Right Ear: Tympanic membrane, ear canal and external ear normal.      Left Ear: Tympanic membrane, ear canal and external ear normal.      Nose: Nose normal.      Mouth/Throat:      Mouth: Mucous membranes are moist.      Pharynx: Oropharynx is clear.   Eyes:      General: No scleral icterus.        Right eye: No discharge.         Left eye: No discharge.      Extraocular Movements: Extraocular movements intact.      Conjunctiva/sclera: Conjunctivae normal.      Pupils: Pupils are equal, round, and reactive to light.   Neck:      Thyroid: No thyromegaly.   Cardiovascular:      Rate and Rhythm: Normal rate and regular rhythm.      Pulses: Normal pulses.      Heart sounds: Normal heart sounds. No murmur heard.  Pulmonary:      Effort: Pulmonary effort is normal. No respiratory distress.      Breath sounds: Normal breath sounds. No wheezing.   Abdominal:      General: Bowel sounds are normal. There is no distension.      Palpations: Abdomen is soft.      Tenderness: There is no abdominal tenderness.   Musculoskeletal:         General: Normal range of motion.      Cervical back: Normal range of motion and neck supple.   Skin:     General: Skin is warm and dry.      Capillary Refill: Capillary refill  takes less than 2 seconds.      Findings: No erythema or rash.   Neurological:      General: No focal deficit present.      Mental Status: He is alert and oriented to person, place, and time.      Coordination: Coordination normal.      Deep Tendon Reflexes: Reflexes are normal and symmetric.   Psychiatric:         Mood and Affect: Mood normal.         Behavior: Behavior normal.         Thought Content: Thought content normal.         Judgment: Judgment normal.       Laboratory Reviewed: (Yes)  Lab Results   Component Value Date    CHOL 224 (H) 08/23/2019    TRIG 107 08/23/2019    HDL 45 08/23/2019    ALT 29 08/23/2019    AST 20 08/23/2019     03/13/2020    K 4.2 03/13/2020     03/13/2020    CREATININE 1.0 03/13/2020    BUN 16 03/13/2020    CO2 26 03/13/2020    TSH 1.022 06/15/2020    PSA 0.36 03/15/2019    HGBA1C 5.9 (H) 03/13/2020       Assessment:  The primary encounter diagnosis was Routine physical examination. A diagnosis of Encounter for hepatitis C screening test for low risk patient was also pertinent to this visit.    Plan:  Routine physical examination  -     Lipid Panel; Future; Expected date: 03/31/2023  -     COMPREHENSIVE METABOLIC PANEL; Future; Expected date: 03/31/2023  -     HEMOGLOBIN A1C; Future; Expected date: 03/31/2023  -     CBC Auto Differential; Future; Expected date: 03/31/2023    Encounter for hepatitis C screening test for low risk patient  -     Hepatitis C Antibody; Future; Expected date: 03/31/2023    Other orders  -     ergocalciferol (ERGOCALCIFEROL) 50,000 unit Cap; Take 1 capsule (50,000 Units total) by mouth every 7 days.  Dispense: 12 capsule; Refill: 1      -Patient's lab results were reviewed and discussed with patient  -Treatment options and alternatives were discussed with the patient. Patient expressed understanding. Patient was given the opportunity to ask questions and be an active participant in their medical care. Patient had no further questions or  concerns at this time.   -Documentation of patient's health and condition was obtained from family member who was present during visit.  -Patient is an overall moderate risk for health complications from their medical conditions.     Follow up: follow up 1 year sooner if needed      Care Plan/Goals: Reviewed N/A   Goals    None             After visit summary printed and given to patient upon discharge.  Patient goals and care plan are included in After visit summary.

## 2023-12-05 ENCOUNTER — HOSPITAL ENCOUNTER (OUTPATIENT)
Dept: RADIOLOGY | Facility: HOSPITAL | Age: 48
Discharge: HOME | End: 2023-12-05
Attending: PODIATRIST
Payer: COMMERCIAL

## 2023-12-05 ENCOUNTER — OFFICE VISIT (OUTPATIENT)
Dept: PODIATRY | Facility: CLINIC | Age: 48
End: 2023-12-05
Payer: COMMERCIAL

## 2023-12-05 VITALS — BODY MASS INDEX: 46.65 KG/M2 | HEIGHT: 69 IN | WEIGHT: 315 LBS

## 2023-12-05 DIAGNOSIS — S93.491S SPRAIN OF ANTERIOR TALOFIBULAR LIGAMENT OF RIGHT ANKLE, SEQUELA: Primary | ICD-10-CM

## 2023-12-05 DIAGNOSIS — Z00.00 ENCOUNTER FOR MEDICAL EXAMINATION TO ESTABLISH CARE: ICD-10-CM

## 2023-12-05 DIAGNOSIS — E66.01 MORBID OBESITY: ICD-10-CM

## 2023-12-05 DIAGNOSIS — M25.471 PAIN AND SWELLING OF RIGHT ANKLE: ICD-10-CM

## 2023-12-05 DIAGNOSIS — M25.571 PAIN AND SWELLING OF RIGHT ANKLE: ICD-10-CM

## 2023-12-05 DIAGNOSIS — S93.411S SPRAIN OF CALCANEOFIBULAR LIGAMENT OF RIGHT ANKLE, SEQUELA: ICD-10-CM

## 2023-12-05 DIAGNOSIS — S93.491S SPRAIN OF ANTERIOR TALOFIBULAR LIGAMENT OF RIGHT ANKLE, SEQUELA: ICD-10-CM

## 2023-12-05 PROCEDURE — 73610 X-RAY EXAM OF ANKLE: CPT | Mod: 26,RT,, | Performed by: RADIOLOGY

## 2023-12-05 PROCEDURE — 1159F MED LIST DOCD IN RCRD: CPT | Mod: CPTII,S$GLB,, | Performed by: PODIATRIST

## 2023-12-05 PROCEDURE — 1160F PR REVIEW ALL MEDS BY PRESCRIBER/CLIN PHARMACIST DOCUMENTED: ICD-10-PCS | Mod: CPTII,S$GLB,, | Performed by: PODIATRIST

## 2023-12-05 PROCEDURE — 99999 PR PBB SHADOW E&M-EST. PATIENT-LVL III: ICD-10-PCS | Mod: PBBFAC,,, | Performed by: PODIATRIST

## 2023-12-05 PROCEDURE — 3008F PR BODY MASS INDEX (BMI) DOCUMENTED: ICD-10-PCS | Mod: CPTII,S$GLB,, | Performed by: PODIATRIST

## 2023-12-05 PROCEDURE — 99999 PR PBB SHADOW E&M-EST. PATIENT-LVL III: CPT | Mod: PBBFAC,,, | Performed by: PODIATRIST

## 2023-12-05 PROCEDURE — 1160F RVW MEDS BY RX/DR IN RCRD: CPT | Mod: CPTII,S$GLB,, | Performed by: PODIATRIST

## 2023-12-05 PROCEDURE — 3008F BODY MASS INDEX DOCD: CPT | Mod: CPTII,S$GLB,, | Performed by: PODIATRIST

## 2023-12-05 PROCEDURE — 3044F PR MOST RECENT HEMOGLOBIN A1C LEVEL <7.0%: ICD-10-PCS | Mod: CPTII,S$GLB,, | Performed by: PODIATRIST

## 2023-12-05 PROCEDURE — 73610 X-RAY EXAM OF ANKLE: CPT | Mod: TC,RT

## 2023-12-05 PROCEDURE — 73610 XR ANKLE COMPLETE 3 VIEW RIGHT: ICD-10-PCS | Mod: 26,RT,, | Performed by: RADIOLOGY

## 2023-12-05 PROCEDURE — 1159F PR MEDICATION LIST DOCUMENTED IN MEDICAL RECORD: ICD-10-PCS | Mod: CPTII,S$GLB,, | Performed by: PODIATRIST

## 2023-12-05 PROCEDURE — 99214 PR OFFICE/OUTPT VISIT, EST, LEVL IV, 30-39 MIN: ICD-10-PCS | Mod: S$GLB,,, | Performed by: PODIATRIST

## 2023-12-05 PROCEDURE — 99214 OFFICE O/P EST MOD 30 MIN: CPT | Mod: S$GLB,,, | Performed by: PODIATRIST

## 2023-12-05 PROCEDURE — 3044F HG A1C LEVEL LT 7.0%: CPT | Mod: CPTII,S$GLB,, | Performed by: PODIATRIST

## 2023-12-05 RX ORDER — DICLOFENAC SODIUM 75 MG/1
75 TABLET, DELAYED RELEASE ORAL 2 TIMES DAILY
Qty: 60 TABLET | Refills: 1 | Status: SHIPPED | OUTPATIENT
Start: 2023-12-05 | End: 2024-01-04

## 2023-12-05 NOTE — PROGRESS NOTES
Subjective:       Patient ID: Dylan Ariza is a 48 y.o. male.    Chief Complaint: Foot Injury (C/o right Foot injury, pt was last seen by Crista Rinaldi MD on 3/31/2023, nondiabetic, rate pain 6/10, pt is wearing tennis shoes and socks )      HPI: Dylan Ariza presents to the clinic today with the complaint of recalcitrant moderate pains to the right ankle, lateral aspect. Patient does state recent trauma. States rolling his ankle while at work. Was seen at the designated clinic ([facility]). States no XRAY. States ACE wrap for treatment. States instability and antalgic gait. States Tylenol and/or NSAIDs. Place of work is [employer] in [place], [place]. Injury occurred on 10/31/21. This appointment was not schedule under Worker's Compensation. Has not missed any work since the injury. No PT OT since the injury.     Review of patient's allergies indicates:  No Known Allergies    Past Medical History:   Diagnosis Date    Chronic back pain     Chronic cough     GERD (gastroesophageal reflux disease)     Globus sensation     Insulin resistance     Mixed hyperlipidemia 2018    Testosterone deficiency        Family History   Problem Relation Age of Onset    Heart disease Father     Hypertension Father     Stroke Father     Heart failure Father     Cancer Paternal Grandmother     Schizophrenia Mother     Diabetes Neg Hx        Social History     Socioeconomic History    Marital status: Single   Tobacco Use    Smoking status: Light Smoker     Current packs/day: 0.00     Types: Cigars, Cigarettes     Last attempt to quit: 2000     Years since quittin.7    Smokeless tobacco: Never    Tobacco comments:     smoked 5 cigars/week   Substance and Sexual Activity    Alcohol use: Yes     Alcohol/week: 4.0 standard drinks of alcohol     Types: 4 Shots of liquor per week     Comment: 2 pints/ weekend    Drug use: No    Sexual activity: Not Currently     Partners: Female   Social  History Narrative    Live w/ girlfriend      Social Determinants of Health     Financial Resource Strain: Low Risk  (3/31/2023)    Overall Financial Resource Strain (CARDIA)     Difficulty of Paying Living Expenses: Not hard at all   Food Insecurity: No Food Insecurity (3/31/2023)    Hunger Vital Sign     Worried About Running Out of Food in the Last Year: Never true     Ran Out of Food in the Last Year: Never true   Transportation Needs: No Transportation Needs (3/31/2023)    PRAPARE - Transportation     Lack of Transportation (Medical): No     Lack of Transportation (Non-Medical): No   Physical Activity: Sufficiently Active (3/31/2023)    Exercise Vital Sign     Days of Exercise per Week: 5 days     Minutes of Exercise per Session: 60 min   Stress: No Stress Concern Present (3/31/2023)    Citizen of [place] Century of Occupational Health - Occupational Stress Questionnaire     Feeling of Stress : Not at all   Social Connections: Unknown (3/31/2023)    Social Connection and Isolation Panel [NHANES]     Frequency of Communication with Friends and Family: More than three times a week     Frequency of Social Gatherings with Friends and Family: Once a week     Active Member of Clubs or Organizations: Yes     Attends Club or Organization Meetings: More than 4 times per year     Marital Status: Never    Housing Stability: Low Risk  (3/31/2023)    Housing Stability Vital Sign     Unable to Pay for Housing in the Last Year: No     Number of Places Lived in the Last Year: 1     Unstable Housing in the Last Year: No       Past Surgical History:   Procedure Laterality Date    SLEEVE GASTROPLASTY         Review of Systems   Constitutional:  Negative for chills, fatigue and fever.   HENT:  Negative for hearing loss.    Eyes:  Negative for photophobia and visual disturbance.   Respiratory:  Negative for cough, chest tightness, shortness of breath and wheezing.    Cardiovascular:  Negative for chest pain and palpitations.  "  Gastrointestinal:  Negative for constipation, diarrhea, nausea and vomiting.   Endocrine: Negative for cold intolerance and heat intolerance.   Genitourinary:  Negative for flank pain.   Musculoskeletal:  Positive for arthralgias, gait problem, joint swelling and myalgias. Negative for neck pain and neck stiffness.   Neurological:  Negative for light-headedness and headaches.   Psychiatric/Behavioral:  Negative for sleep disturbance.          Objective:   Ht 5' 9" (1.753 m)   Wt (!) 153.8 kg (339 lb)   BMI 50.06 kg/m²     Physical Exam  LOWER EXTREMITY PHYSICAL EXAMINATION    DERMATOLOGY: Skin is supple, dry and intact. No ecchymosis is noted.     ORTHOPEDIC: There is no tenderness to palpation of the medial malleolus. There is moderate tenderness to palpation of the lateral malleolus. There is moderate tenderness to palpation of the ATFL. There is moderate pain to palpation of the CFL. There is mild pain to palpation of the PFL. There is mild tenderness to palpation of the deltoid ligament complex. Compression of the tibia and fibula at the mid-calf does not produce pains at the distal ankle articulation to suggestion high ankle sprain. There is no tenderness to palpation of the 5th met. base. There is no tenderness to palpation of the navicular tuberosity. There is moderate to palpation along the course of the PB and PL tendons. There is moderate edema noted to this area. There is moderate retro-malleolar edema.  No subluxing peroneals are noted. There is no tenderness to palpation of the course of the posterior tibial tendon. There is no edema noted along its course. There is moderate tenderness to palpation of the anterior lateral ankle gutter. There is mild tenderness to palpation of the anterior medial ankle gutter. Anterior Drawer Testing is WNL. Stress valgus and varus ankle testing is WNL. There is no pain to the area of the sinus tarsi. Rectus  foot type is noted. No palpable foot or ankle fractures " are noted. MMT is painful with inversion and eversion with and without resistance and decreased as compared to the contra-lateral. MMT is decreased on the right as compared to the contra-lateral. Patient is able to bear weight. Gait pattern is antalgic.    Assessment:     1. Sprain of anterior talofibular ligament of right ankle, sequela    2. Sprain of calcaneofibular ligament of right ankle, sequela    3. Pain and swelling of right ankle    4. Morbid obesity    5. Encounter for medical examination to establish care          Plan:     Sprain of anterior talofibular ligament of right ankle, sequela  -     X-Ray Ankle Complete Right; Future; Expected date: 12/05/2023    Sprain of calcaneofibular ligament of right ankle, sequela  -     X-Ray Ankle Complete Right; Future; Expected date: 12/05/2023    Pain and swelling of right ankle  -     X-Ray Ankle Complete Right; Future; Expected date: 12/05/2023  -     diclofenac (VOLTAREN) 75 MG EC tablet; Take 1 tablet (75 mg total) by mouth 2 (two) times daily.  Dispense: 60 tablet; Refill: 1    Morbid obesity    Encounter for medical examination to establish care  -     Ambulatory referral/consult to Internal Medicine; Future; Expected date: 12/12/2023    Thorough discussion is had with the patient today, concerning the diagnosis, its etiology, and the treatment algorithm at present.     XRAYS are reviewed in detail with the patient. All questions and concerns regarding findings and its/their implications are outlined and discussed.    Start ASO Brace for the RLE.    Follow up in 4 or so weeks.    Will continue work for now.    Continue NSAIDs.    ASO Brace is dispensed to the patient. The ASO Brace is appropriately fitted and customized to the patient's lower extremity physique by the LPN/MA/Ortho Tech. Patient to ambulate with the ASO Brace at all times. The patient should not sleep with the device or shower with the device. The patient should exercise caution when driving  with the device, if dispensed for right ankle/foot pathology.               No future appointments.

## 2023-12-06 ENCOUNTER — TELEPHONE (OUTPATIENT)
Dept: INTERNAL MEDICINE | Facility: CLINIC | Age: 48
End: 2023-12-06
Payer: COMMERCIAL

## 2023-12-06 ENCOUNTER — PATIENT MESSAGE (OUTPATIENT)
Dept: PODIATRY | Facility: CLINIC | Age: 48
End: 2023-12-06
Payer: COMMERCIAL

## 2023-12-06 NOTE — TELEPHONE ENCOUNTER
----- Message from Allan Wright DPM sent at 12/5/2023  4:18 PM CST -----  Regarding: Referral placed to establish care (prior Dr. Rinaldi patient).

## 2023-12-28 ENCOUNTER — TELEPHONE (OUTPATIENT)
Dept: INTERNAL MEDICINE | Facility: CLINIC | Age: 48
End: 2023-12-28
Payer: COMMERCIAL

## 2023-12-28 NOTE — TELEPHONE ENCOUNTER
----- Message from ALISTAIR Cameron MD sent at 12/27/2023  5:21 PM CST -----  Regarding: FW: Referral placed to establish care (prior Dr. Rinaldi patient).  Please schedule him soonest VV or OV to establish care. Thanks.  ----- Message -----  From: Allan Wright DPM  Sent: 12/5/2023   4:19 PM CST  To: ALISTAIR Cameron MD; Allan Wright DPM; #  Subject: Referral placed to establish care (prior DrAriana#

## 2024-01-10 ENCOUNTER — TELEPHONE (OUTPATIENT)
Dept: PODIATRY | Facility: CLINIC | Age: 49
End: 2024-01-10
Payer: COMMERCIAL

## 2024-01-16 ENCOUNTER — OFFICE VISIT (OUTPATIENT)
Dept: FAMILY MEDICINE | Facility: CLINIC | Age: 49
End: 2024-01-16
Attending: FAMILY MEDICINE
Payer: COMMERCIAL

## 2024-01-16 VITALS
DIASTOLIC BLOOD PRESSURE: 80 MMHG | HEIGHT: 69 IN | RESPIRATION RATE: 18 BRPM | HEART RATE: 90 BPM | TEMPERATURE: 97 F | BODY MASS INDEX: 46.65 KG/M2 | OXYGEN SATURATION: 96 % | SYSTOLIC BLOOD PRESSURE: 132 MMHG | WEIGHT: 315 LBS

## 2024-01-16 DIAGNOSIS — L98.9 SKIN LESION OF FACE: ICD-10-CM

## 2024-01-16 DIAGNOSIS — E66.01 MORBID OBESITY WITH BMI OF 50.0-59.9, ADULT: ICD-10-CM

## 2024-01-16 DIAGNOSIS — Z72.0 TOBACCO USE: ICD-10-CM

## 2024-01-16 DIAGNOSIS — R35.1 NOCTURIA: ICD-10-CM

## 2024-01-16 DIAGNOSIS — R73.03 PREDIABETES: ICD-10-CM

## 2024-01-16 DIAGNOSIS — D35.2 PROLACTIN-SECRETING PITUITARY ADENOMA: ICD-10-CM

## 2024-01-16 DIAGNOSIS — M54.50 LEFT-SIDED LOW BACK PAIN WITHOUT SCIATICA, UNSPECIFIED CHRONICITY: ICD-10-CM

## 2024-01-16 PROCEDURE — 3079F DIAST BP 80-89 MM HG: CPT | Mod: CPTII,S$GLB,, | Performed by: FAMILY MEDICINE

## 2024-01-16 PROCEDURE — 3008F BODY MASS INDEX DOCD: CPT | Mod: CPTII,S$GLB,, | Performed by: FAMILY MEDICINE

## 2024-01-16 PROCEDURE — 1160F RVW MEDS BY RX/DR IN RCRD: CPT | Mod: CPTII,S$GLB,, | Performed by: FAMILY MEDICINE

## 2024-01-16 PROCEDURE — 99999 PR PBB SHADOW E&M-EST. PATIENT-LVL V: CPT | Mod: PBBFAC,,, | Performed by: FAMILY MEDICINE

## 2024-01-16 PROCEDURE — 3075F SYST BP GE 130 - 139MM HG: CPT | Mod: CPTII,S$GLB,, | Performed by: FAMILY MEDICINE

## 2024-01-16 PROCEDURE — 99215 OFFICE O/P EST HI 40 MIN: CPT | Mod: S$GLB,,, | Performed by: FAMILY MEDICINE

## 2024-01-16 PROCEDURE — 1159F MED LIST DOCD IN RCRD: CPT | Mod: CPTII,S$GLB,, | Performed by: FAMILY MEDICINE

## 2024-01-16 RX ORDER — IBUPROFEN 800 MG/1
800 TABLET ORAL 2 TIMES DAILY
COMMUNITY
Start: 2023-11-11 | End: 2024-01-17

## 2024-01-16 NOTE — PROGRESS NOTES
Dylan Ariza    Chief Complaint   Patient presents with    Establish Care       History of Present Illness:   Mr. Ariza comes in today to establish care with me.  He states he has been previously followed by PCP Dr. Crista Rinaldi. He states he is not fasting today.    He complains of having intermittent pain in his left lower back with laying down for 2 weeks.  He denies having associated trauma and states pain is not similar to low back pain which he has had in the past.  He states he drinks apple juice and takes extra-strength Tylenol without help.  He states pain causes him sleepless nights.  He states pain is 8/10 on pain scale today. He also complains of having pain at vein behind his left knee for 1 month.  He states pain is 4/10 on pain scale today. He saw Dr. Wright, podiatrist, on December 5, 2023 for sprain of anterior talofibular ligament of right ankle, sequela, sprain of calcaneofibular ligament of right ankle, sequela, pain and swelling of right ankle, morbid obesity and was prescribed Diclofenac for pain.     He complains of having nocturia for years. He saw Dr. Connors, urologist, on Gudelia 15, 2020 for prolactinoma, hypogonadism in male, erectile dysfunction, unspecified erectile dysfunction type, morbid obesity and then saw Dr. Li, neurosurgeon, on January 24, 2022 for prolactin-secreting pituitary adenoma, morbid obesity, testosterone deficiency, chronic bilateral low back pain without sciatica, prolactinoma, pituitary insufficiency at which time he was treated with Dostinex.  He states he has not been taking Dostinex for at least 1 year as he did not insurance; he states he now has insurance.     He complains of having mole at the side of his right face near his right eye for least a year.  He states mole is getting bigger but not painful.    He states he is interested in taking Wegovy or Ozempic for weight management.  He has prediabetes but reports no history of diabetes,  pancreatitis, gallbladder disease, thyroid cancer and no known family history of thyroid cancer or MEN2.  He also reports no history of hypertension. He states he moves around a lot at work (within in the [employer]). He states he no longer smokes cigarettes but continues to smoke cigars.  He states he drinks alcohol only on the weekends.  He denies use of illicit drugs.     Otherwise, he denies having fever, chills, fatigue, appetite changes; shortness of breath, cough, wheezing; chest pain, palpitations, leg swelling; abdominal pain, nausea, vomiting, diarrhea, constipation; other unusual urinary symptoms; polydipsia, polyphagia, polyuria, hot or cold intolerance; headache; anxiety, depression, homicidal or suicidal thoughts.        Labs:                      WBC                      6.33                03/31/2023                 HGB                      12.3 (L)            03/31/2023                 HCT                      38.6 (L)            03/31/2023                 PLT                      214                 03/31/2023                 CHOL                     257 (H)             03/31/2023                 TRIG                     130                 03/31/2023                 HDL                      40                  03/31/2023                 ALT                      30                  03/31/2023                 AST                      28                  03/31/2023                 NA                       141                 03/31/2023                 K                        4.6                 03/31/2023                 CL                       105                 03/31/2023                 CREATININE               0.9                 03/31/2023                 BUN                      16                  03/31/2023                 CO2                      26                  03/31/2023                 TSH                      1.022               06/15/2020                 PSA                       0.36                03/15/2019                 HGBA1C                   5.7 (H)             03/31/2023         LDLCALC                  191.0 (H)           03/31/2023                  Past Medical History:   Diagnosis Date    Chronic back pain     Chronic cough     GERD (gastroesophageal reflux disease)     Globus sensation     Insulin resistance     Mixed hyperlipidemia 12/13/2018    Prediabetes     Prolactin-secreting pituitary adenoma     Testosterone deficiency     Vitamin D deficiency       Current Outpatient Medications   Medication Sig    ibuprofen (ADVIL,MOTRIN) 800 MG tablet Take 800 mg by mouth 2 (two) times daily.         Review of Systems   Constitutional:  Negative for activity change, appetite change, chills, fatigue and fever.   Eyes:  Negative for visual disturbance.   Respiratory:  Negative for cough, shortness of breath and wheezing.    Cardiovascular:  Negative for chest pain, palpitations and leg swelling.   Gastrointestinal:  Negative for constipation, diarrhea, nausea and vomiting.   Endocrine: Negative for cold intolerance, heat intolerance, polydipsia, polyphagia and polyuria.        See history of present illness.   Genitourinary:  Negative for difficulty urinating.        Positive for nocturia     Musculoskeletal:  Positive for back pain. Negative for arthralgias and myalgias.   Skin:         See history of present illness.   Neurological:  Negative for numbness and headaches.   Psychiatric/Behavioral:  Negative for dysphoric mood and suicidal ideas. The patient is not nervous/anxious.         Negative for homicidal ideas.       Objective:  Physical Exam  Vitals reviewed.   Constitutional:       General: He is not in acute distress.     Appearance: Normal appearance. He is obese. He is not ill-appearing, toxic-appearing or diaphoretic.      Comments: Pleasant.   Cardiovascular:      Rate and Rhythm: Normal rate and regular rhythm.      Pulses: Normal pulses.      Heart sounds: No murmur  heard.  Pulmonary:      Effort: Pulmonary effort is normal. No respiratory distress.      Breath sounds: Normal breath sounds. No wheezing.   Abdominal:      General: Bowel sounds are normal. There is no distension.      Palpations: Abdomen is soft. There is no mass.      Tenderness: There is no abdominal tenderness. There is left CVA tenderness. There is no right CVA tenderness, guarding or rebound.   Musculoskeletal:         General: Tenderness present. No swelling. Normal range of motion.      Cervical back: Normal range of motion and neck supple. No tenderness.      Comments: He is ambulatory without problems. Tender at posterior left knee with full range of motion noted and tender at left lower back with full range of motion noted.   Lymphadenopathy:      Cervical: No cervical adenopathy.   Skin:     General: Skin is warm.      Comments: Flesh-colored blister-like lesion at right side of face adjacent to right eye.    Non-tender varicose vein at lateral left leg noted.   Neurological:      General: No focal deficit present.      Mental Status: He is alert and oriented to person, place, and time.   Psychiatric:         Mood and Affect: Mood normal.         Behavior: Behavior normal.         Thought Content: Thought content normal.         Judgment: Judgment normal.         ASSESSMENT:  1. Left-sided low back pain without sciatica, unspecified chronicity    2. Prediabetes    3. Nocturia    4. Prolactin-secreting pituitary adenoma    5. Skin lesion of face    6. Tobacco use    7. Morbid obesity with BMI of 50.0-59.9, adult        PLAN:  Dylan was seen today for establish care.    Diagnoses and all orders for this visit:    Left-sided low back pain without sciatica, unspecified chronicity  -     Urinalysis  -     Urine culture; Future    Prediabetes  -     Hemoglobin A1C; Future  -     TSH; Future    Nocturia  -     Comprehensive Metabolic Panel; Future  -     Hemoglobin A1C; Future  -     Urinalysis  -     Urine  culture; Future  -     PSA, Screening; Future    Prolactin-secreting pituitary adenoma  -     Ambulatory referral/consult to Neurosurgery; Future    Skin lesion of face  -     Ambulatory referral/consult to Dermatology; Future    Tobacco use    Morbid obesity with BMI of 50.0-59.9, adult      Patient advised to call for results/follow up recommendations.  Continue current medications, follow low sodium, low cholesterol, low carb diet, daily walks.  As patient stated Ibuprofen causes stomach lining irritation and requested pain medication, I offered patient muscle relaxant - Tizanidine 4 mg twice daily prn muscle pain with medication precautions discussed with patient.  Patient declined medication for pain and stated muscle relaxant will not help and left exam room without receiving labs, appointment(s) information as discussed above.  Smoking cessation advised.  Keep follow up with specialists.  Follow up if symptoms worsen or fail to improve.    40 minutes of total time spent on the encounter, which includes face to face time and non-face to face time preparing to see the patient (eg, review of tests), Obtaining and/or reviewing separately obtained history, Documenting clinical information in the electronic or other health record, Independently interpreting results (not separately reported) and communicating results to the patient/family/caregiver, or Care coordination (not separately reported).      This note is  generated with speech recognition software and is subject to transcription error and sound alike phrases that may be missed by proofreading.

## 2024-01-17 ENCOUNTER — PATIENT MESSAGE (OUTPATIENT)
Dept: INTERNAL MEDICINE | Facility: CLINIC | Age: 49
End: 2024-01-17

## 2024-01-17 ENCOUNTER — LAB VISIT (OUTPATIENT)
Dept: LAB | Facility: HOSPITAL | Age: 49
End: 2024-01-17
Payer: COMMERCIAL

## 2024-01-17 ENCOUNTER — OFFICE VISIT (OUTPATIENT)
Dept: INTERNAL MEDICINE | Facility: CLINIC | Age: 49
End: 2024-01-17
Payer: COMMERCIAL

## 2024-01-17 ENCOUNTER — HOSPITAL ENCOUNTER (OUTPATIENT)
Dept: RADIOLOGY | Facility: HOSPITAL | Age: 49
Discharge: HOME | End: 2024-01-17
Attending: FAMILY MEDICINE
Payer: COMMERCIAL

## 2024-01-17 VITALS
TEMPERATURE: 96 F | HEART RATE: 90 BPM | DIASTOLIC BLOOD PRESSURE: 100 MMHG | HEIGHT: 68 IN | WEIGHT: 315 LBS | OXYGEN SATURATION: 96 % | BODY MASS INDEX: 47.74 KG/M2 | SYSTOLIC BLOOD PRESSURE: 140 MMHG | RESPIRATION RATE: 20 BRPM

## 2024-01-17 DIAGNOSIS — R03.0 ELEVATED BLOOD PRESSURE READING: ICD-10-CM

## 2024-01-17 DIAGNOSIS — M79.671 PAIN IN RIGHT FOOT: ICD-10-CM

## 2024-01-17 DIAGNOSIS — R73.03 PREDIABETES: ICD-10-CM

## 2024-01-17 DIAGNOSIS — L98.9 SKIN LESION OF FACE: ICD-10-CM

## 2024-01-17 DIAGNOSIS — R10.9 ACUTE LEFT FLANK PAIN: ICD-10-CM

## 2024-01-17 DIAGNOSIS — E66.01 MORBID OBESITY WITH BMI OF 50.0-59.9, ADULT: ICD-10-CM

## 2024-01-17 DIAGNOSIS — D35.2 PROLACTIN-SECRETING PITUITARY ADENOMA: ICD-10-CM

## 2024-01-17 DIAGNOSIS — R35.1 NOCTURIA: ICD-10-CM

## 2024-01-17 DIAGNOSIS — E34.9 TESTOSTERONE DEFICIENCY: ICD-10-CM

## 2024-01-17 DIAGNOSIS — M25.562 PAIN AND SWELLING OF LEFT KNEE: ICD-10-CM

## 2024-01-17 DIAGNOSIS — M25.462 PAIN AND SWELLING OF LEFT KNEE: ICD-10-CM

## 2024-01-17 DIAGNOSIS — E78.2 MIXED HYPERLIPIDEMIA: ICD-10-CM

## 2024-01-17 DIAGNOSIS — R10.9 ACUTE LEFT FLANK PAIN: Primary | ICD-10-CM

## 2024-01-17 DIAGNOSIS — K21.9 GASTROESOPHAGEAL REFLUX DISEASE WITHOUT ESOPHAGITIS: ICD-10-CM

## 2024-01-17 PROBLEM — R63.5 ABNORMAL WEIGHT GAIN: Status: RESOLVED | Noted: 2018-11-30 | Resolved: 2024-01-17

## 2024-01-17 PROBLEM — E88.819 INSULIN RESISTANCE: Status: RESOLVED | Noted: 2018-12-13 | Resolved: 2024-01-17

## 2024-01-17 PROBLEM — M54.50 CHRONIC LOWER BACK PAIN: Status: RESOLVED | Noted: 2020-05-27 | Resolved: 2024-01-17

## 2024-01-17 PROBLEM — R12 HEARTBURN: Status: RESOLVED | Noted: 2020-05-27 | Resolved: 2024-01-17

## 2024-01-17 PROBLEM — Z72.0 TOBACCO USE: Status: RESOLVED | Noted: 2024-01-16 | Resolved: 2024-01-17

## 2024-01-17 PROBLEM — G89.29 CHRONIC LOWER BACK PAIN: Status: RESOLVED | Noted: 2020-05-27 | Resolved: 2024-01-17

## 2024-01-17 LAB
BILIRUB UR QL STRIP: NEGATIVE
CLARITY UR: CLEAR
COLOR UR: YELLOW
GLUCOSE UR QL STRIP: NEGATIVE
HGB UR QL STRIP: NEGATIVE
KETONES UR QL STRIP: NEGATIVE
LEUKOCYTE ESTERASE UR QL STRIP: NEGATIVE
NITRITE UR QL STRIP: NEGATIVE
PH UR STRIP: 7 [PH] (ref 5–8)
PROT UR QL STRIP: NEGATIVE
SP GR UR STRIP: 1.02 (ref 1–1.03)
URN SPEC COLLECT METH UR: NORMAL

## 2024-01-17 PROCEDURE — 3077F SYST BP >= 140 MM HG: CPT | Mod: CPTII,S$GLB,, | Performed by: FAMILY MEDICINE

## 2024-01-17 PROCEDURE — 99215 OFFICE O/P EST HI 40 MIN: CPT | Mod: S$GLB,,, | Performed by: FAMILY MEDICINE

## 2024-01-17 PROCEDURE — 73562 X-RAY EXAM OF KNEE 3: CPT | Mod: 26,59,RT, | Performed by: RADIOLOGY

## 2024-01-17 PROCEDURE — 81003 URINALYSIS AUTO W/O SCOPE: CPT | Performed by: FAMILY MEDICINE

## 2024-01-17 PROCEDURE — 72110 X-RAY EXAM L-2 SPINE 4/>VWS: CPT | Mod: TC

## 2024-01-17 PROCEDURE — 1160F RVW MEDS BY RX/DR IN RCRD: CPT | Mod: CPTII,S$GLB,, | Performed by: FAMILY MEDICINE

## 2024-01-17 PROCEDURE — 73564 X-RAY EXAM KNEE 4 OR MORE: CPT | Mod: 26,LT,, | Performed by: RADIOLOGY

## 2024-01-17 PROCEDURE — 99999 PR PBB SHADOW E&M-EST. PATIENT-LVL V: CPT | Mod: PBBFAC,,, | Performed by: FAMILY MEDICINE

## 2024-01-17 PROCEDURE — 3008F BODY MASS INDEX DOCD: CPT | Mod: CPTII,S$GLB,, | Performed by: FAMILY MEDICINE

## 2024-01-17 PROCEDURE — 3080F DIAST BP >= 90 MM HG: CPT | Mod: CPTII,S$GLB,, | Performed by: FAMILY MEDICINE

## 2024-01-17 PROCEDURE — 1159F MED LIST DOCD IN RCRD: CPT | Mod: CPTII,S$GLB,, | Performed by: FAMILY MEDICINE

## 2024-01-17 PROCEDURE — 73560 X-RAY EXAM OF KNEE 1 OR 2: CPT | Mod: 59,TC,RT

## 2024-01-17 PROCEDURE — 72110 X-RAY EXAM L-2 SPINE 4/>VWS: CPT | Mod: 26,,, | Performed by: RADIOLOGY

## 2024-01-17 RX ORDER — LOSARTAN POTASSIUM 50 MG/1
50 TABLET ORAL DAILY
Qty: 90 TABLET | Refills: 1 | Status: SHIPPED | OUTPATIENT
Start: 2024-01-17 | End: 2025-01-16

## 2024-01-17 RX ORDER — NAPROXEN 500 MG/1
500 TABLET ORAL 2 TIMES DAILY PRN
Qty: 30 TABLET | Refills: 0 | Status: SHIPPED | OUTPATIENT
Start: 2024-01-17

## 2024-01-17 RX ORDER — METHOCARBAMOL 750 MG/1
750 TABLET, FILM COATED ORAL 3 TIMES DAILY PRN
Qty: 30 TABLET | Refills: 0 | Status: SHIPPED | OUTPATIENT
Start: 2024-01-17 | End: 2024-01-27

## 2024-01-17 RX ORDER — IBUPROFEN 800 MG/1
800 TABLET ORAL 2 TIMES DAILY
Qty: 60 TABLET | Refills: 1 | Status: SHIPPED | OUTPATIENT
Start: 2024-01-17 | End: 2024-04-01 | Stop reason: ALTCHOICE

## 2024-01-17 NOTE — PROGRESS NOTES
"Chief Complaint  Chief Complaint   Patient presents with    Back Pain    Flank Pain     Pt states that he is here for back pain and left side   Pt states that been taking some over the counter pain meds but it's not helping with the pain.  Pt states that it's more his left side then his back  Pt states has been drinking apple and cranberry juice to help   Pt states his pain is a 8       HPI  Dylan Ariza is a 48 y.o. male with multiple medical diagnoses as listed in the medical history and problem list that presents for .  Their last appointment with primary care was yesterday with Dr. Smyth.    He reports that he is having pain on the left side close to the flank 8/10 and pain in the left popliteal region at a 4/10, which worsens when walking. He states that he saw primary care yesterday but felt that "nothing was done," which prompted to come in today. He reports that he took Tylenol and cranberry juice to help alleviate his symptoms but it has not improved. He has been experience the left-sided pain for about a month, which worsened the past few days where he was unable to sleep because of the pain. 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 H also reports having a vesicle at his outer right eye, which seems to be growing.       PAST MEDICAL HISTORY:  Past Medical History:   Diagnosis Date    Chronic back pain     Chronic cough     GERD (gastroesophageal reflux disease)     Globus sensation     Insulin resistance     Mixed hyperlipidemia 12/13/2018    Prediabetes     Prolactin-secreting pituitary adenoma     Testosterone deficiency     Vitamin D deficiency        PAST SURGICAL HISTORY:  Past Surgical History:   Procedure Laterality Date    SLEEVE GASTROPLASTY         SOCIAL HISTORY:  Social History     Socioeconomic History    Marital status: Single   Occupational History    Occupation:    Tobacco Use    Smoking status: Light Smoker     " Current packs/day: 0.00     Types: Cigars, Cigarettes     Last attempt to quit: 2000     Years since quittin.9    Smokeless tobacco: Never    Tobacco comments:     smoked 5 cigars/week   Substance and Sexual Activity    Alcohol use: Yes     Alcohol/week: 4.0 standard drinks of alcohol     Types: 4 Shots of liquor per week     Comment: 2 pints/ weekend    Drug use: No    Sexual activity: Not Currently     Partners: Female   Social History Narrative    Live w/ girlfriend      Social Determinants of Health     Financial Resource Strain: Low Risk  (2024)    Overall Financial Resource Strain (CARDIA)     Difficulty of Paying Living Expenses: Not hard at all   Food Insecurity: No Food Insecurity (2024)    Hunger Vital Sign     Worried About Running Out of Food in the Last Year: Never true     Ran Out of Food in the Last Year: Never true   Transportation Needs: No Transportation Needs (2024)    PRAPARE - Transportation     Lack of Transportation (Medical): No     Lack of Transportation (Non-Medical): No   Physical Activity: Sufficiently Active (2024)    Exercise Vital Sign     Days of Exercise per Week: 5 days     Minutes of Exercise per Session: 90 min   Stress: No Stress Concern Present (2024)    [employer] [place] - Occupational Stress Questionnaire     Feeling of Stress : Not at all   Social Connections: Unknown (2024)    Social Connection and Isolation Panel [NHANES]     Frequency of Communication with Friends and Family: More than three times a week     Frequency of Social Gatherings with Friends and Family: Patient declined     Active Member of Clubs or Organizations: No     Attends Club or Organization Meetings: Patient declined     Marital Status: Never    Housing Stability: High Risk (2024)    Housing Stability Vital Sign     Unable to Pay for Housing in the Last Year: Yes     Number of Places Lived in the Last Year: 2  "    Unstable Housing in the Last Year: No       FAMILY HISTORY:  Family History   Problem Relation Age of Onset    Schizophrenia Mother     Heart disease Father     Hypertension Father     Stroke Father     Heart failure Father     No Known Problems Sister     No Known Problems Sister     No Known Problems Sister     No Known Problems Sister     No Known Problems Sister     Cancer Paternal Grandmother     No Known Problems Daughter     Diabetes Neg Hx     Thyroid cancer Neg Hx         MEN2       ALLERGIES AND MEDICATIONS: updated and reviewed.  Review of patient's allergies indicates:  No Known Allergies  Current Outpatient Medications   Medication Sig Dispense Refill    ibuprofen (ADVIL,MOTRIN) 800 MG tablet Take 800 mg by mouth 2 (two) times daily.       No current facility-administered medications for this visit.         ROS  Review of Systems   Constitutional: Negative.    HENT: Negative.     Eyes: Negative.    Respiratory: Negative.     Cardiovascular: Negative.    Gastrointestinal: Negative.    Endocrine: Negative.         Nocturia   Genitourinary: Negative.    Musculoskeletal:  Positive for myalgias (left side torso).   Skin: Negative.    Allergic/Immunologic: Negative.    Neurological: Negative.    Hematological: Negative.    Psychiatric/Behavioral: Negative.           Physical Exam  Vitals:    01/17/24 0822   BP: (!) 140/100   BP Location: Left arm   Patient Position: Sitting   BP Method: Large (Manual)   Pulse: 90   Resp: 20   Temp: 96.4 °F (35.8 °C)   TempSrc: Tympanic   SpO2: 96%   Weight: (!) 164.2 kg (361 lb 15.9 oz)   Height: 5' 8" (1.727 m)    Body mass index is 55.04 kg/m².  Weight: (!) 164.2 kg (361 lb 15.9 oz)   Height: 5' 8" (172.7 cm)   Physical Exam      Health Maintenance         Date Due Completion Date    COVID-19 Vaccine (1) Never done ---    Pneumococcal Vaccines (Age 0-64) (1 - PCV) Never done ---    TETANUS VACCINE Never done ---    High Dose Statin Never done ---    " Influenza Vaccine (1) 09/01/2023 10/11/2013    Hemoglobin A1c (Prediabetes) 03/31/2024 3/31/2023    Colorectal Cancer Screening 12/03/2025 12/5/2018    Lipid Panel 03/31/2028 3/31/2023    Override on 11/30/2018: Done    Override on 10/18/2013: Done              Assessment and Plan:  There are no diagnoses linked to this encounter.    Hilario Recinos, MS4  [facility]

## 2024-01-17 NOTE — PROGRESS NOTES
Report given to floor RN.    Subjective:       Patient ID: Dylan Ariza is a 48 y.o. male.    Chief Complaint: Back Pain and Flank Pain (Pt states that he is here for back pain and left side /Pt states that been taking some over the counter pain meds but it's not helping with the pain./Pt states that it's more his left side then his back/Pt states has been drinking apple and cranberry juice to help /Pt states his pain is a 8)    48-year-old  male patient new to my clinic here to establish care previously seen by Dr. Rinaldi and Dr. Smyth  Patient with Patient Active Problem List:     Morbid obesity with BMI of 50.0-59.9, adult     Gastroesophageal reflux disease without esophagitis     Intestinal malabsorption     Testosterone deficiency     Mixed hyperlipidemia     Central sleep apnea due to medical condition     Prolactin-secreting pituitary adenoma     Prediabetes  Reports that he has been having left flank pain for the past 1 month, does not recall having any injury or trauma but reports pain up to 8/10  Patient has also been having left posterior knee swelling and discomfort  Has been taking over-the-counter Tylenol with no relief  Notices increased urinary frequency especially at bedtime and urgency, has not seen urologist lately  Patient would like to consider losing weight with Ozempic or Wegovy  Denies of any chest pain or difficulty breathing with palpitations or headache with vision disturbances and noted elevated blood pressure today  Patient secondary to prolactinoma has not seen neurosurgeon lately, denies any double vision  Reports having skin lesion to the lateral side of the right eye which started getting bigger and would like to get checked      Review of Systems   Constitutional:  Negative for appetite change and fatigue.   Eyes:  Negative for visual disturbance.   Respiratory:  Negative for shortness of breath.    Cardiovascular:  Negative for chest pain, palpitations and leg swelling.  "  Gastrointestinal:  Negative for abdominal pain, nausea and vomiting.   Genitourinary:  Positive for flank pain, frequency and urgency. Negative for dysuria.   Musculoskeletal:  Positive for arthralgias, joint swelling and myalgias.   Skin:  Negative for rash.   Neurological:  Negative for weakness, numbness and headaches.   Psychiatric/Behavioral:  Negative for sleep disturbance.          BP (!) 140/100 (BP Location: Left arm, Patient Position: Sitting, BP Method: Large (Manual))   Pulse 90   Temp 96.4 °F (35.8 °C) (Tympanic)   Resp 20   Ht 5' 8" (1.727 m)   Wt (!) 164.2 kg (361 lb 15.9 oz)   SpO2 96%   BMI 55.04 kg/m²   Objective:      Physical Exam  Constitutional:       Appearance: He is well-developed.   HENT:      Head: Normocephalic and atraumatic.   Cardiovascular:      Rate and Rhythm: Normal rate and regular rhythm.      Heart sounds: Normal heart sounds. No murmur heard.  Pulmonary:      Effort: Pulmonary effort is normal.      Breath sounds: Normal breath sounds. No wheezing.   Abdominal:      General: Bowel sounds are normal.      Palpations: Abdomen is soft.      Tenderness: There is no abdominal tenderness.   Musculoskeletal:         General: Tenderness present.      Comments: Positive for tenderness to the left flank/paraspinal lumbar muscles on palpation today  Straight leg raise test negative bilaterally  Positive for swelling to the left knee posteriorly with tenderness   Skin:     General: Skin is warm and dry.      Findings: Lesion present. No rash.      Comments: Noted raised pedunculated skin lesion to the lateral side of the right eye   Neurological:      Mental Status: He is alert and oriented to person, place, and time.   Psychiatric:         Mood and Affect: Mood normal.           Assessment/Plan:   1. Acute left flank pain  -     X-Ray Lumbar Spine 5 View; Future; Expected date: 01/17/2024  -     CBC Auto Differential; Future; Expected date: 01/17/2024  -     Comprehensive " Metabolic Panel; Future; Expected date: 01/17/2024  -     Urinalysis, Reflex to Urine Culture Urine, Clean Catch; Future; Expected date: 01/17/2024  -     methocarbamoL (ROBAXIN) 750 MG Tab; Take 1 tablet (750 mg total) by mouth 3 (three) times daily as needed.  Dispense: 30 tablet; Refill: 0  -     naproxen (NAPROSYN) 500 MG tablet; Take 1 tablet (500 mg total) by mouth 2 (two) times daily as needed.  Dispense: 30 tablet; Refill: 0  Could be musculoskeletal or radiation of pain from the lower back  Will get x-ray of the lumbar spine to look into further etiology and naproxen and Robaxin prescribed today for symptomatic relief  Warm compresses recommended    2. Pain and swelling of left knee  -     X-Ray Knee 3 View Left; Future; Expected date: 01/17/2024  -     naproxen (NAPROSYN) 500 MG tablet; Take 1 tablet (500 mg total) by mouth 2 (two) times daily as needed.  Dispense: 30 tablet; Refill: 0  Will get x-ray of the left knee to look into popliteal cyst if being involved causing left knee posterior swelling      3. Prediabetes  -     Hemoglobin A1C; Future; Expected date: 01/17/2024  Diet controlled    4. Mixed hyperlipidemia  Currently not taking any statins  Follow-up in 3 months with lifestyle modifications with diet and exercise    5. Gastroesophageal reflux disease without esophagitis  Stable with diet changes    6. Testosterone deficiency  Patient to follow-up with urology    7. Prolactin-secreting pituitary adenoma  To be followed by Neurosurgery    8. Elevated blood pressure reading  -     losartan (COZAAR) 50 MG tablet; Take 1 tablet (50 mg total) by mouth once daily.  Dispense: 90 tablet; Refill: 1  Secondary to elevated blood pressure will get started on losartan 50 mg and follow-up in 3 months  Encouraged to restrict salt intake and monitor blood pressure trend    9. Morbid obesity with BMI of 50.0-59.9, adult  Strict lifestyle changes recommended with diet and exercise to lose weight with BMI  55    10. Skin lesion of face  -     Ambulatory referral/consult to Dermatology; Future; Expected date: 01/24/2024  Will refer to Dermatology for further evaluation and possibly removal    11. Nocturia  -     Urinalysis, Reflex to Urine Culture Urine, Clean Catch; Future; Expected date: 01/17/2024  -     Hemoglobin A1C; Future; Expected date: 01/17/2024  Secondary to nocturia will get further labs checked out

## 2024-01-29 ENCOUNTER — OFFICE VISIT (OUTPATIENT)
Dept: PODIATRY | Facility: CLINIC | Age: 49
End: 2024-01-29
Payer: COMMERCIAL

## 2024-01-29 DIAGNOSIS — M25.571 PAIN AND SWELLING OF RIGHT ANKLE: ICD-10-CM

## 2024-01-29 DIAGNOSIS — E66.01 MORBID OBESITY: ICD-10-CM

## 2024-01-29 DIAGNOSIS — S93.491S SPRAIN OF ANTERIOR TALOFIBULAR LIGAMENT OF RIGHT ANKLE, SEQUELA: Primary | ICD-10-CM

## 2024-01-29 DIAGNOSIS — M25.471 PAIN AND SWELLING OF RIGHT ANKLE: ICD-10-CM

## 2024-01-29 DIAGNOSIS — S93.411S SPRAIN OF CALCANEOFIBULAR LIGAMENT OF RIGHT ANKLE, SEQUELA: ICD-10-CM

## 2024-01-29 PROCEDURE — 4010F ACE/ARB THERAPY RXD/TAKEN: CPT | Mod: CPTII,S$GLB,, | Performed by: PODIATRIST

## 2024-01-29 PROCEDURE — 1160F RVW MEDS BY RX/DR IN RCRD: CPT | Mod: CPTII,S$GLB,, | Performed by: PODIATRIST

## 2024-01-29 PROCEDURE — 3044F HG A1C LEVEL LT 7.0%: CPT | Mod: CPTII,S$GLB,, | Performed by: PODIATRIST

## 2024-01-29 PROCEDURE — 99213 OFFICE O/P EST LOW 20 MIN: CPT | Mod: S$GLB,,, | Performed by: PODIATRIST

## 2024-01-29 PROCEDURE — 99999 PR PBB SHADOW E&M-EST. PATIENT-LVL III: CPT | Mod: PBBFAC,,, | Performed by: PODIATRIST

## 2024-01-29 PROCEDURE — 1159F MED LIST DOCD IN RCRD: CPT | Mod: CPTII,S$GLB,, | Performed by: PODIATRIST

## 2024-01-29 NOTE — PROGRESS NOTES
Subjective:       Patient ID: Dylan Ariza is a 48 y.o. male.    Chief Complaint: Follow-up (Follow up , no pain at presnet, nondiabetic )      HPI: Dylan Ariza presents to the clinic today for follow up concerning ATFL and CFL pathology on the RLE. Was last seen here on 2023. At that time, he was Rx NSAIDs. He was not dispensed a CAM Walker. At this time, he states no pains or swelling. Is WB with sneakers.    Review of patient's allergies indicates:  No Known Allergies    Past Medical History:   Diagnosis Date    Chronic back pain     Chronic cough     GERD (gastroesophageal reflux disease)     Globus sensation     Insulin resistance     Mixed hyperlipidemia 2018    Prediabetes     Prolactin-secreting pituitary adenoma     Testosterone deficiency     Vitamin D deficiency        Family History   Problem Relation Age of Onset    Schizophrenia Mother     Heart disease Father     Hypertension Father     Stroke Father     Heart failure Father     No Known Problems Sister     No Known Problems Sister     No Known Problems Sister     No Known Problems Sister     No Known Problems Sister     Cancer Paternal Grandmother     No Known Problems Daughter     Diabetes Neg Hx     Thyroid cancer Neg Hx         MEN2       Social History     Socioeconomic History    Marital status: Single   Occupational History    Occupation:    Tobacco Use    Smoking status: Light Smoker     Current packs/day: 0.00     Types: Cigars, Cigarettes     Last attempt to quit: 2000     Years since quittin.9    Smokeless tobacco: Never    Tobacco comments:     smoked 5 cigars/week   Substance and Sexual Activity    Alcohol use: Yes     Alcohol/week: 4.0 standard drinks of alcohol     Types: 4 Shots of liquor per week     Comment: 2 pints/ weekend    Drug use: No    Sexual activity: Not Currently     Partners: Female   Social History Narrative    Live w/ girlfriend      Social Determinants of Health      Financial Resource Strain: Low Risk  (1/16/2024)    Overall Financial Resource Strain (CARDIA)     Difficulty of Paying Living Expenses: Not hard at all   Food Insecurity: No Food Insecurity (1/16/2024)    Hunger Vital Sign     Worried About Running Out of Food in the Last Year: Never true     Ran Out of Food in the Last Year: Never true   Transportation Needs: No Transportation Needs (1/16/2024)    PRAPARE - Transportation     Lack of Transportation (Medical): No     Lack of Transportation (Non-Medical): No   Physical Activity: Sufficiently Active (1/16/2024)    Exercise Vital Sign     Days of Exercise per Week: 5 days     Minutes of Exercise per Session: 90 min   Stress: No Stress Concern Present (1/16/2024)    [employer] - Occupational Stress Questionnaire     Feeling of Stress : Not at all   Social Connections: Unknown (1/16/2024)    Social Connection and Isolation Panel [NHANES]     Frequency of Communication with Friends and Family: More than three times a week     Frequency of Social Gatherings with Friends and Family: Patient declined     Active Member of Clubs or Organizations: No     Attends Club or Organization Meetings: Patient declined     Marital Status: Never    Housing Stability: High Risk (1/16/2024)    Housing Stability Vital Sign     Unable to Pay for Housing in the Last Year: Yes     Number of Places Lived in the Last Year: 2     Unstable Housing in the Last Year: No       Past Surgical History:   Procedure Laterality Date    SLEEVE GASTROPLASTY         Review of Systems   Constitutional:  Negative for chills, fatigue and fever.   HENT:  Negative for hearing loss.    Eyes:  Negative for photophobia and visual disturbance.   Respiratory:  Negative for cough, chest tightness, shortness of breath and wheezing.    Cardiovascular:  Negative for chest pain and palpitations.   Gastrointestinal:  Negative for constipation, diarrhea, nausea and vomiting.    Endocrine: Negative for cold intolerance and heat intolerance.   Genitourinary:  Negative for flank pain.   Musculoskeletal:  Negative for neck pain and neck stiffness.   Neurological:  Negative for light-headedness and headaches.   Psychiatric/Behavioral:  Negative for sleep disturbance.          Objective:   There were no vitals taken for this visit.    Physical Exam  LOWER EXTREMITY PHYSICAL EXAMINATION    DERMATOLOGY: Skin is supple, dry and intact. No ecchymosis is noted.     ORTHOPEDIC: There is no tenderness to palpation of the medial malleolus. There is no tenderness to palpation of the lateral malleolus. There is no tenderness to palpation of the ATFL. There is no pain to palpation of the CFL. There is no pain to palpation of the PFL. There is no tenderness to palpation of the deltoid ligament complex. Compression of the tibia and fibula at the mid-calf does not produce pains at the distal ankle articulation to suggestion high ankle sprain. There is no tenderness to palpation of the 5th met. base. There is no tenderness to palpation of the navicular tuberosity. There is no to palpation along the course of the PB and PL tendons. There is no edema noted to this area. There is no retro-malleolar edema.  No subluxing peroneals are noted. There is no tenderness to palpation of the course of the posterior tibial tendon. There is no edema noted along its course. There is no tenderness to palpation of the anterior lateral ankle gutter. There is no tenderness to palpation of the anterior medial ankle gutter. Anterior Drawer Testing is WNL. Stress valgus and varus ankle testing is WNL. There is no pain to the area of the sinus tarsi. Rectus  foot type is noted. No palpable foot or ankle fractures are noted. MMT is painful with inversion and eversion with and without resistance is not decreased as compared to the contra-lateral. MMT is decreased on the right as compared to the contra-lateral. Patient is able to bear  weight. Gait pattern is antalgic.    Assessment:     1. Sprain of anterior talofibular ligament of right ankle, sequela    2. Sprain of calcaneofibular ligament of right ankle, sequela    3. Pain and swelling of right ankle    4. Morbid obesity          Plan:     Sprain of anterior talofibular ligament of right ankle, sequela    Sprain of calcaneofibular ligament of right ankle, sequela    Pain and swelling of right ankle    Morbid obesity    Thorough discussion is had with the patient today, concerning the diagnosis, its etiology, and the treatment algorithm at present.     States complete resolution of pathology.    Continue NSAIDs prn.    Patient is counseled and reminded concerning the importance of good nutrition and healthy eating habits, which may include blood sugar control, to prevent and/or help podiatric foot and ankle complications.    Did discuss proper and supportive shoe gear in detail and at length with the patient.  These are shoes with firm and robust arch support; medial counter.  Shoes which only bend at the metatarsophalangeal joint and which are rigid in the midfoot and hindfoot. Patient urged to purchase running type or cross training type shoes gear which are designed for pronation control.               Future Appointments   Date Time Provider Department Center   1/30/2024 11:00 AM Michel Araiza MD ON NEURR  [facility]   1/30/2024  2:45 PM Latrell Garza MD [facility]   4/17/2024  8:00 AM Nerissa Ferraro MD [facility]

## 2024-01-30 ENCOUNTER — PATIENT MESSAGE (OUTPATIENT)
Dept: DERMATOLOGY | Facility: CLINIC | Age: 49
End: 2024-01-30

## 2024-01-30 ENCOUNTER — PATIENT MESSAGE (OUTPATIENT)
Dept: NEUROSURGERY | Facility: CLINIC | Age: 49
End: 2024-01-30
Payer: COMMERCIAL

## 2024-01-30 ENCOUNTER — OFFICE VISIT (OUTPATIENT)
Dept: DERMATOLOGY | Facility: CLINIC | Age: 49
End: 2024-01-30
Attending: FAMILY MEDICINE
Payer: COMMERCIAL

## 2024-01-30 ENCOUNTER — PATIENT MESSAGE (OUTPATIENT)
Dept: INTERNAL MEDICINE | Facility: CLINIC | Age: 49
End: 2024-01-30
Payer: COMMERCIAL

## 2024-01-30 ENCOUNTER — TELEPHONE (OUTPATIENT)
Dept: INTERNAL MEDICINE | Facility: CLINIC | Age: 49
End: 2024-01-30
Payer: COMMERCIAL

## 2024-01-30 DIAGNOSIS — L98.9 SKIN LESION OF FACE: ICD-10-CM

## 2024-01-30 DIAGNOSIS — D23.9 HIDROCYSTOMA: Primary | ICD-10-CM

## 2024-01-30 PROCEDURE — 3044F HG A1C LEVEL LT 7.0%: CPT | Mod: CPTII,S$GLB,, | Performed by: STUDENT IN AN ORGANIZED HEALTH CARE EDUCATION/TRAINING PROGRAM

## 2024-01-30 PROCEDURE — 1160F RVW MEDS BY RX/DR IN RCRD: CPT | Mod: CPTII,S$GLB,, | Performed by: STUDENT IN AN ORGANIZED HEALTH CARE EDUCATION/TRAINING PROGRAM

## 2024-01-30 PROCEDURE — 99999 PR PBB SHADOW E&M-EST. PATIENT-LVL III: CPT | Mod: PBBFAC,,, | Performed by: STUDENT IN AN ORGANIZED HEALTH CARE EDUCATION/TRAINING PROGRAM

## 2024-01-30 PROCEDURE — 1159F MED LIST DOCD IN RCRD: CPT | Mod: CPTII,S$GLB,, | Performed by: STUDENT IN AN ORGANIZED HEALTH CARE EDUCATION/TRAINING PROGRAM

## 2024-01-30 PROCEDURE — 99202 OFFICE O/P NEW SF 15 MIN: CPT | Mod: S$GLB,,, | Performed by: STUDENT IN AN ORGANIZED HEALTH CARE EDUCATION/TRAINING PROGRAM

## 2024-01-30 PROCEDURE — 4010F ACE/ARB THERAPY RXD/TAKEN: CPT | Mod: CPTII,S$GLB,, | Performed by: STUDENT IN AN ORGANIZED HEALTH CARE EDUCATION/TRAINING PROGRAM

## 2024-01-30 NOTE — PATIENT INSTRUCTIONS
XEROSIS (DRY SKIN)        Definition    Xerosis is the term for dry skin.  We all have a natural oil coating over our skin produced by the skin oil glands.  If this oil is removed, the skin becomes dry which can lead to cracking, which can lead to inflammation.  Xerosis is usually a long-term problem that recurs often, especially in the winter.    Cause    Long hot baths or showers can remove our natural oil and lead to xerosis.  One should never take more than one bath or shower a day and for no longer than ten minutes.  Use of harsh soaps such as Zest, Dial, and Ivory can worsen and cause xerosis.  Cold winter weather worsens xerosis because the amount of moisture contained in cold air is much less than the amount of moisture in warm air.    Treatment    Treatment is intended to restore the natural oil to your skin.  Keep the skin lubricated.    Do not take more than one bath or shower a day.  Use lukewarm water, not hot.  Hot water dries out the skin.    Use a gentle moisturizing soap such as Cetaphil soap, Oil of Olay, Dove, Basis, Ivory moisture care, Restoraderm cleanser.    When toweling dry, dont rub.  Blot the skin so there is still some water left on the skin.  You should apply a moisturizing cream to all of the skin such as Cerave cream, Cetaphil cream, Restoraderm or Eucerin Original Formula cream.   Alpha hydroxyacid lotions, i.e., AmLactin, also work very well for preventing dry skin, but may burn when used on inflamed or reddened skin.    If you like to swim during the winter months, you should not use soap when getting out of the pool.  When you have finished swimming, rinse off the chlorine with cool to warm water.  If this will be the only shower of the day, then you may use Cetaphil or another mild soap to cleanse your skin.  After the shower, apply a moisturizing cream to all of the skin as above.        [id number] [place], [place] [address]/ [phone]; fax: [phone]/  [email or web address]

## 2024-01-30 NOTE — PROGRESS NOTES
Subjective:       Patient ID:  Dylan Ariza is a 48 y.o. male who presents for   Chief Complaint   Patient presents with    Spot     On eye     History of Present Illness: The patient presents with chief complaint of a persistent cystic growth.  Location: on the corner of the right outer eye  Duration: growing over months  Signs/Symptoms: translucent growing nodule  Prior treatments: none      Spot        Review of Systems   Constitutional:  Negative for fever and chills.   Skin:  Negative for itching, rash and dry skin.        Objective:    Physical Exam   Constitutional: He appears well-developed and well-nourished. No distress.   Neurological: He is alert and oriented to person, place, and time. He is not disoriented.   Psychiatric: He has a normal mood and affect.   Skin:   Areas Examined (abnormalities noted in diagram):   Head / Face Inspection Performed  Neck Inspection Performed              Diagram Legend     Erythematous scaling macule/papule c/w actinic keratosis       Vascular papule c/w angioma      Pigmented verrucoid papule/plaque c/w seborrheic keratosis      Yellow umbilicated papule c/w sebaceous hyperplasia      Irregularly shaped tan macule c/w lentigo     1-2 mm smooth white papules consistent with Milia      Movable subcutaneous cyst with punctum c/w epidermal inclusion cyst      Subcutaneous movable cyst c/w pilar cyst      Firm pink to brown papule c/w dermatofibroma      Pedunculated fleshy papule(s) c/w skin tag(s)      Evenly pigmented macule c/w junctional nevus     Mildly variegated pigmented, slightly irregular-bordered macule c/w mildly atypical nevus      Flesh colored to evenly pigmented papule c/w intradermal nevus       Pink pearly papule/plaque c/w basal cell carcinoma      Erythematous hyperkeratotic cursted plaque c/w SCC      Surgical scar with no sign of skin cancer recurrence      Open and closed comedones      Inflammatory papules and pustules      Verrucoid  papule consistent consistent with wart     Erythematous eczematous patches and plaques     Dystrophic onycholytic nail with subungual debris c/w onychomycosis     Umbilicated papule    Erythematous-base heme-crusted tan verrucoid plaque consistent with inflamed seborrheic keratosis     Erythematous Silvery Scaling Plaque c/w Psoriasis     See annotation      Assessment / Plan:        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 offered to drain the lesion, but patient requesting surgical removal. Given location, will ask for assistance from ophthalmology.   -     Ambulatory referral/consult to Ophthalmology; Future; Expected date: 02/06/2024           Follow up if symptoms worsen or fail to improve.

## 2024-01-30 NOTE — TELEPHONE ENCOUNTER
"----- Message from ALISTAIR Cameron MD sent at 1/30/2024  8:58 AM CST -----  Regarding: FW: Referral placed to establish care (prior Dr. Rinaldi patient).  Dr. Wright referred this patient to establish care with me. He had an appointment with Dr. Ferraro 1/17/24 for acute flank pain.    TO MY TEAM:  -Please contact patient to notify him of the referral and offer VV introductory "get-to-know-you" appointment or OV for annual wellness visit and to establish care with me.  -NOTE: If he's decided to establish care with Dr. Ferraro as his PCP, then no appointment or further action is needed.    Thank you for your help caring for our patients!    -LM     ----- Message -----  From: Allan Wright DPM  Sent: 1/10/2024  11:20 AM CST  To: ALISTAIR Cameron MD  Subject: FW: Referral placed to establish care (prior#      ----- Message -----  From: Allan Wright DPM  Sent: 12/5/2023   4:19 PM CST  To: ALISTAIR Cameron MD; Allan Wright DPM; #  Subject: Referral placed to establish care (prior DrAriana#            "

## 2024-01-30 NOTE — TELEPHONE ENCOUNTER
I advised the patient that the provider was running behind and had not made it to clinic. When calling the patient back for appointment the patient stated that he had been waiting an hour. The patient continued his conversation on his cell phone about find another Neurosurgeon.

## 2024-02-13 RX ORDER — DICLOFENAC SODIUM 75 MG/1
75 TABLET, DELAYED RELEASE ORAL 2 TIMES DAILY
Qty: 60 TABLET | Refills: 1 | Status: SHIPPED | OUTPATIENT
Start: 2024-02-13 | End: 2024-04-01 | Stop reason: SDUPTHER

## 2024-02-21 ENCOUNTER — PATIENT MESSAGE (OUTPATIENT)
Dept: SPORTS MEDICINE | Facility: CLINIC | Age: 49
End: 2024-02-21

## 2024-02-21 ENCOUNTER — OFFICE VISIT (OUTPATIENT)
Dept: SPORTS MEDICINE | Facility: CLINIC | Age: 49
End: 2024-02-21
Payer: COMMERCIAL

## 2024-02-21 VITALS — RESPIRATION RATE: 17 BRPM | BODY MASS INDEX: 47.74 KG/M2 | WEIGHT: 315 LBS | HEIGHT: 68 IN

## 2024-02-21 DIAGNOSIS — M17.12 PRIMARY OSTEOARTHRITIS OF LEFT KNEE: Primary | ICD-10-CM

## 2024-02-21 PROCEDURE — 99203 OFFICE O/P NEW LOW 30 MIN: CPT | Mod: 25,S$GLB,, | Performed by: PHYSICIAN ASSISTANT

## 2024-02-21 PROCEDURE — 3008F BODY MASS INDEX DOCD: CPT | Mod: CPTII,S$GLB,, | Performed by: PHYSICIAN ASSISTANT

## 2024-02-21 PROCEDURE — 1159F MED LIST DOCD IN RCRD: CPT | Mod: CPTII,S$GLB,, | Performed by: PHYSICIAN ASSISTANT

## 2024-02-21 PROCEDURE — 20610 DRAIN/INJ JOINT/BURSA W/O US: CPT | Mod: LT,S$GLB,, | Performed by: PHYSICIAN ASSISTANT

## 2024-02-21 PROCEDURE — 4010F ACE/ARB THERAPY RXD/TAKEN: CPT | Mod: CPTII,S$GLB,, | Performed by: PHYSICIAN ASSISTANT

## 2024-02-21 PROCEDURE — 1160F RVW MEDS BY RX/DR IN RCRD: CPT | Mod: CPTII,S$GLB,, | Performed by: PHYSICIAN ASSISTANT

## 2024-02-21 PROCEDURE — 99999 PR PBB SHADOW E&M-EST. PATIENT-LVL III: CPT | Mod: PBBFAC,,, | Performed by: PHYSICIAN ASSISTANT

## 2024-02-21 PROCEDURE — 3044F HG A1C LEVEL LT 7.0%: CPT | Mod: CPTII,S$GLB,, | Performed by: PHYSICIAN ASSISTANT

## 2024-02-21 RX ORDER — TRIAMCINOLONE ACETONIDE 40 MG/ML
40 INJECTION, SUSPENSION INTRA-ARTICULAR; INTRAMUSCULAR
Status: DISCONTINUED | OUTPATIENT
Start: 2024-02-21 | End: 2024-02-21 | Stop reason: HOSPADM

## 2024-02-21 RX ADMIN — TRIAMCINOLONE ACETONIDE 40 MG: 40 INJECTION, SUSPENSION INTRA-ARTICULAR; INTRAMUSCULAR at 09:02

## 2024-02-21 NOTE — PROCEDURES
38w5d. No OB issues. Denies LOF/Vb  SVE: 0.5 cm  Labor precautions. RTO 1 week. Large Joint Aspiration/Injection: L knee    Date/Time: 2/21/2024 9:30 AM    Performed by: Svetlana Solo PA-C  Authorized by: Svetlana Solo PA-C    Consent Done?:  Yes (Verbal)  Indications:  Pain  Site marked: the procedure site was marked    Timeout: prior to procedure the correct patient, procedure, and site was verified    Prep: patient was prepped and draped in usual sterile fashion      Local anesthesia used?: Yes    Anesthesia:  Local infiltration  Local anesthetic:  Lidocaine 1% without epinephrine    Details:  Needle Size:  22 G  Ultrasonic Guidance for needle placement?: No    Approach:  Anterolateral  Location:  Knee  Site:  L knee  Medications:  40 mg triamcinolone acetonide 40 mg/mL  Aspirate amount (mL):  0  Patient tolerance:  Patient tolerated the procedure well with no immediate complications    Procedure Note:  We discussed the risk and benefits of injections, including pain, infection, bleeding, damage to adjacent structures, risk of reaction to injection. We discussed the steroid/cortisone injections will not heal the problem but mat help decrease inflammation and help with symptoms. We discussed the risk of repeated injections. The patient expressed understanding and wanted to proceed with the injection. We performed a timeout to verify the proper patient, proper procedure, and the proper site. The injection site was prepared in a sterile fashion. The patient tolerated it well and there were no complication. We did discuss with the patient that steroid injections can cause some increase in blood sugar and blood pressure for up to a week after the injection.      oral

## 2024-02-21 NOTE — LETTER
February 21, 2024      [facility]  [id number]  [place] [address]  Phone: [phone]  Fax: [phone]       Patient: Dylan Ariza   YOB: 1975  Date of Visit: 02/21/2024    To Whom It May Concern:    Mi Ariza  was at [facility] on 02/21/2024. The patient may return to work/school on 02/22/2024. If you have any questions or concerns, or if I can be of further assistance, please do not hesitate to contact me.    Sincerely,              Svetlana Solo PA-C // [facility] Doshi [facility]

## 2024-02-21 NOTE — PROGRESS NOTES
Orthopaedics Sports Medicine     Knee Initial Visit         2/21/2024    Referring MD: No ref. provider found    Chief Complaint   Patient presents with    Left Knee - Pain     Back of the knee.       History of Present Illness:   Dylan Ariza is a 48 y.o. year old male patient presents with pain and dysfunction involving the LEFT knee.     Onset of the symptoms was about 3 months ago, has been worsening and becoming more frequent     Inciting event:  No acute injury or trauma.     Current symptoms include posterior knee pain, morning stiffness, night pain    Pain is aggravated by prolonged standing, prolonged ambulation, going up and downstairs.      Evaluation to date:  X-ray.     Treatment to date:  Rest, activity modification, oral anti-inflammatories, diclofenac .       Past Medical History:   Past Medical History:   Diagnosis Date    Chronic back pain     Chronic cough     GERD (gastroesophageal reflux disease)     Globus sensation     Insulin resistance     Mixed hyperlipidemia 12/13/2018    Prediabetes     Prolactin-secreting pituitary adenoma     Testosterone deficiency     Vitamin D deficiency        Past Surgical History:   Past Surgical History:   Procedure Laterality Date    SLEEVE GASTROPLASTY         Medications:  Patient's Medications   New Prescriptions    No medications on file   Previous Medications    DICLOFENAC (VOLTAREN) 75 MG EC TABLET    TAKE 1 TABLET BY MOUTH TWICE DAILY    IBUPROFEN (ADVIL,MOTRIN) 800 MG TABLET    TAKE 1 TABLET BY MOUTH 2 TIMES DAILY.    LOSARTAN (COZAAR) 50 MG TABLET    Take 1 tablet (50 mg total) by mouth once daily.    NAPROXEN (NAPROSYN) 500 MG TABLET    Take 1 tablet (500 mg total) by mouth 2 (two) times daily as needed.   Modified Medications    No medications on file   Discontinued Medications    No medications on file        Allergies: Review of patient's allergies indicates:  No Known Allergies    Social History:   [place]: [place], [place]  occupation:  "   alcohol use: He reports current alcohol use of about 4.0 standard drinks of alcohol per week.  tobacco use: He reports that he has been smoking cigars and cigarettes. He has never used smokeless tobacco.    Review of systems:  history of recent illness, fevers, shakes, or chills: no  history of cardiac problems or chest pain: HTN, HLD  history of of pulmonary problems or asthma: no  history of diabetes: no  history of prior DVT or clotting problems: no  history of sleep apnea: central sleep apnea    Physical Examination:  Estimated body mass index is 55.04 kg/m² as calculated from the following:    Height as of this encounter: 5' 8" (1.727 m).    Weight as of this encounter: 164.2 kg (361 lb 15.9 oz).    Standing exam  stance: normal alignment, no significant leg-length discrepancy  gait: antalgic    Knee      RIGHT  LEFT  Skin:     Intact   Intact  ROM:     0-130  0-130  Effusion:    Neg   Neg  Medial joint line tenderness:  Neg   +  Lateral joint line tenderness:  Neg   Neg  Loida:     Neg   Neg  Patella crepitus:   Neg   +  Patella tenderness:   Neg   Neg  Patella grind:      Neg   Neg  Lachman:    Neg   Neg  Valgus stress:    Neg   Neg  Varus stress:    Neg   Neg  Posterior drawer:   Neg   Neg  N-V               intact  intact  Hip:    nml    nml   Lower extremity edema: Negative negative    Neurovascular exam  - motor function grossly intact bilaterally to hip flexion, knee extension and flexion, ankle dorsiflexion and plantarflexion  - sensation intact to light touch bilaterally to femoral, tibial, tibial and peroneal distributions  - symmetrical pedal pulses    Imaging:   XR Results:  Results for orders placed during the hospital encounter of 01/17/24    X-ray Knee Ortho Left    Narrative  EXAMINATION:  XR KNEE ORTHO LEFT    CLINICAL HISTORY:  Pain in left knee    TECHNIQUE:  AP standing views of both knees, AP flexion views of both knees, lateral view of the left knee and Merchant " views of both knees    COMPARISON:  None    FINDINGS:  There is mild joint space narrowing minimal osteophyte formation seen involving the medial and patellofemoral compartments of the left knee.  No joint effusion.  No acute fracture or dislocation.    Impression  1.  As above      Electronically signed by: Jordin Ferraro DO  Date:    01/17/2024  Time:    09:59      Physician Read: I agree with the above impression.  There is medial joint space loss present within the left knee as well as marginal osteophyte formation consistent with a Kellgren Jair grade 2    Impression:  48 y.o. male with primary osteoarthritis of the left knee    Plan:  Discussed diagnosis and treatment options with the patient today.  His history, physical exam, and imaging is consistent with primary osteoarthritis of the left knee  We discussed the natural progression of osteoarthritis and treatment options.  Treatment options include rest, activity modification, oral anti-inflammatories, knee bracing, physical therapy, intermittent injections. Injection types include intermittent corticosteroid injection versus viscosupplementation, he has had neither of these injections thus far  Today I discussed moving forward with a left knee corticosteroid injection as well as initiation into a home exercise program  Blood pressure tried prior to corticosteroid injection, blood pressure found to be 125/80, left knee CSI given in clinic, patient tolerated the procedure well with no immediate complications   [facility] knee strengthening conditioning program printed, provided  He was previously prescribed diclofenac tablets, he reports that this slightly helped with the pain.  Encouraged him to continue taking this as needed for knee pain  I do think he would be a good candidate for Visco in the future, I would like to see how he does with steroid first  Follow-up with me in 2 months            Svetlana Solo PA-C  Sports Medicine Physician Assistant        Disclaimer: This note was prepared using a voice recognition system and is likely to have sound alike errors within the text.

## 2024-02-21 NOTE — PATIENT INSTRUCTIONS
"Arthritis of the Knee    This information does not include all information related to this topic. For more information please visit the [employer] website using the following link: Knee Osteoarthritis    Arthritis is inflammation of one or more of your joints. Pain, swelling, and stiffness are the primary symptoms of arthritis. Any joint in the body may be affected by the disease, but it is particularly common in the knee. Knee arthritis can make it hard to do many everyday activities, such as walking or climbing stairs. It is a major cause of lost work time and a serious disability for many people.    The most common types of arthritis are osteoarthritis and rheumatoid arthritis, but there are more than 100 different forms. While arthritis is mainly an adult disease, some forms affect children. Although there is no cure for arthritis, there are many treatment options available to help manage pain and keep people staying active.    Anatomy  The knee is the largest and strongest joint in your body. It is made up of the lower end of the femur (thighbone), the upper end of the tibia (shinbone), and the patella (kneecap). The ends of the three bones that form the knee joint are covered with articular cartilage, a smooth, slippery substance that protects and cushions the bones as you bend and straighten your knee.    Two wedge-shaped pieces of cartilage called meniscus act as "shock absorbers" between your thighbone and shinbone. They are tough and rubbery to help cushion the joint and keep it stable.    The knee joint is surrounded by a thin lining called the synovial membrane. This membrane releases a fluid that lubricates the cartilage and reduces friction.        Description  The major types of arthritis that affect the knee are osteoarthritis, rheumatoid arthritis, and posttraumatic arthritis.    Osteoarthritis  Osteoarthritis is the most common form of arthritis in the knee. It is a " "degenerative, "wear-and-tear" type of arthritis that occurs most often in people 50 years of age and older, although it may occur in younger people, too. In osteoarthritis, the cartilage in the knee joint gradually wears away. As the cartilage wears away, it becomes frayed and rough, and the protective space between the bones decreases. This can result in bone rubbing on bone, and produce painful bone spurs. Osteoarthritis usually develops slowly and the pain it causes worsens over time.      Rheumatoid Arthritis  Rheumatoid arthritis is a chronic disease that attacks multiple joints throughout the body, including the knee joint. It is symmetrical, meaning that it usually affects the same joint on both sides of the body. In rheumatoid arthritis, the synovial membrane that covers the knee joint begins to swell. This results in knee pain and stiffness. Rheumatoid arthritis is an autoimmune disease. This means that the immune system attacks its own tissues. The immune system damages normal tissue (such as cartilage and ligaments) and softens the bone.    Posttraumatic Arthritis  Posttraumatic arthritis is form of arthritis that develops after an injury to the knee. For example, a broken bone may damage the joint surface and lead to arthritis years after the injury. Meniscal tears and ligament injuries can cause instability and additional wear on the knee joint which, over time, can result in arthritis.    Symptoms  A knee joint affected by arthritis may be painful and inflamed. Generally, the pain develops gradually over time, although sudden onset is also possible. There are other symptoms, as well:  The joint may become stiff and swollen, making it difficult to bend and straighten the knee.  Pain and swelling may be worse in the morning, or after sitting or resting.  Vigorous activity may cause pain to flare up.  Loose fragments of cartilage and other tissue can interfere with the smooth motion of joints. The knee " "may "lock" or "stick" during movement. It may creak, click, snap or make a grinding noise (crepitus).  Pain may cause a feeling of weakness or buckling in the knee.  Many people with arthritis note increased joint pain with changes in the weather.    Doctor Examination  During your appointment, your doctor will talk with you about your symptoms and medical history, conduct a physical examination, and possibly order diagnostic tests, such as x-rays or blood tests.    Physical Examination  During the physical examination, your doctor will look for:  Joint swelling, warmth, or redness  Tenderness around the knee  Range of passive (assisted) and active (self-directed) motion  Instability of the joint  Crepitus (a grating sensation inside the joint) with movement  Pain when weight is placed on the knee  Problems with your gait (the way you walk)  Any signs of injury to the muscles, tendons, and ligaments surrounding the knee  Involvement of other joints (an indication of rheumatoid arthritis)    Imaging Tests  X-rays: These imaging tests provide detailed pictures of dense structures, such as bone. They can help distinguish among various forms of arthritis. X-rays of an arthritic knee may show a narrowing of the joint space, changes in the bone, and the formation of bone spurs (osteophytes).  Other tests: Occasionally, a magnetic resonance imaging (MRI) scan or a computerized tomography (CT) scan may be needed to determine the condition of the bone and soft tissues of your knee.          Laboratory Tests  Your doctor may also recommend blood tests to determine which type of arthritis you have. With some types of arthritis, including rheumatoid arthritis, blood tests will help with a proper diagnosis.    Treatment  There is no cure for arthritis but there are a number of treatments that may help relieve the pain and disability it can cause.    Nonsurgical Treatment  As with other arthritic conditions, initial treatment of " "arthritis of the knee is nonsurgical. Your doctor may recommend a range of treatment options.    Lifestyle modifications  Some changes in your daily life can protect your knee joint and slow the progress of arthritis.  Minimize activities that aggravate the condition, such as climbing stairs.  Exercise is recommended for osteoarthritis to improve pain and function. Switching from high-impact activities (like jogging or tennis) to lower impact activities (like swimming or cycling) will enable you to be active while putting less stress on your knee. Balance, agility, and coordination exercises, combined with traditional exercise, may help to improve function and walking speed.  Losing weight can reduce stress on the knee joint, resulting in less pain and increased function.    Physical therapy  Specific exercises can help increase range of motion and flexibility, as well as help strengthen the muscles in your leg. Your doctor or a physical therapist can help develop an individualized exercise program that meets your needs and lifestyle.  Examples of knee exercises can be found at the following link: Knee Conditioning Program    Assistive devices  Using devices such as a cane, or wearing a brace or knee sleeve can be helpful. A brace assists with stability and function, and may be especially helpful if the arthritis is centered on one side of the knee. There are two types of braces that are often used for knee arthritis: An "" brace shifts weight away from the affected portion of the knee, while a "support" brace helps support the entire knee load.    Other remedies  Applying heat or ice, or wearing elastic bandages to provide support to the knee may provide some relief from pain.    Medications  Several types of drugs are useful in treating arthritis of the knee. Because people respond differently to medications, your doctor will work closely with you to determine the medications and dosages that are safe and " "effective for you.    Over-the-counter, non-narcotic pain relievers and anti-inflammatory medications are usually the first choice of therapy for arthritis of the knee. Acetaminophen is a simple, over-the-counter pain reliever that can be effective in reducing arthritis pain. Like all medications, over-the-counter pain relievers can cause side effects and interact with other medications you are taking. Be sure to discuss potential side effects with your doctor.    Another type of pain reliever is a nonsteroidal anti-inflammatory drug, or NSAID (pronounced "en-said"). NSAIDs, such as ibuprofen and naproxen, are available both over-the-counter and by prescription and in oral and topical (gel) forms. Oral NSAIDs are recommended to improve pain and function in people with knee osteoarthritis. Topical NSAIDs can help improve function and quality of life for people with knee osteoarthritis. However, NSAIDs should be used with caution, or avoided, in people with certain health conditions, such as coronary artery disease, congestive heart failure, and chronic kidney disease. Talk to your doctor about whether NSAIDs are right for you.    A BAUGH-2 inhibitor is a special type of NSAID that may cause fewer gastrointestinal side effects. Common brand names of BAUGH-2 inhibitors include Celebrex (celecoxib) and Mobic (meloxicam, which is a partial BAUGH-2 inhibitor). A BAUGH-2 inhibitor reduces pain and inflammation so that you can function better. If you are taking a BAUGH-2 inhibitor, you should not use a traditional NSAID (prescription or over-the-counter). Be sure to tell your doctor if you have had a heart attack, stroke, angina, blood clot, hypertension, or if you are sensitive to aspirin, sulfa drugs, or other NSAIDs.    Corticosteroids (also known as cortisone) are powerful anti-inflammatory agents that can be injected into the joint. These injections provide pain relief and reduce inflammation; however, the effects do not last " "indefinitely. Your doctor may recommend limiting the number of injections to three or four per year, per joint, due to possible side effects. In some cases, pain and swelling may "flare" immediately after the injection, and the potential exists for long-term joint damage or infection. With frequent repeated injections, or injections over an extended period of time, joint damage can actually increase rather than decrease.    Disease-modifying anti-rheumatic drugs (DMARDs) are used to slow the progression of rheumatoid arthritis. Drugs like methotrexate, sulfasalazine, and hydroxychloroquine are commonly prescribed.  In addition, biologic DMARDs like etanercept (Enbrel) and adalimumab (Humira) may reduce the body's overactive immune response. Because there are many different drugs today for rheumatoid arthritis, a rheumatology specialist is often required to effectively manage medications.    Glucosamine and chondroitin sulfate, substances found naturally in joint cartilage, can be taken as dietary supplements. Although patient reports indicate that these supplements may relieve pain, there is no evidence to support the use of glucosamine and chondroitin sulfate to decrease or reverse the progression of arthritis.     In addition, the [place] Food and Drug Administration does not test dietary supplements before they are sold to consumers. These compounds may cause side effects, as well as negative interactions with other medications. Always consult your doctor before taking dietary supplements.    Alternative therapies  Many alternative forms of therapy are unproven, but may be helpful to try, provided you find a qualified practitioner and keep your doctor informed of your decision. Alternative therapies to treat pain include the use of acupuncture, magnetic pulse therapy, platelet-rich plasma, and stem cell injections.  Acupuncture uses fine needles to stimulate specific body areas to relieve pain or temporarily numb an " area. Although it is used in many parts of the world and evidence suggests that it can help ease the pain of arthritis, there are few scientific studies of its effectiveness. Be sure your acupuncturist is certified, and do not hesitate to ask about his or her sterilization practices.  Magnetic pulse therapy is painless and works by applying a pulsed signal to the knee, which is placed in an electromagnetic field. Like many alternative therapies, magnetic pulse therapy has yet to be proven.  Treatments such as platelet-rich plasma (PRP) and stem cell injections involve taking cells from your own body and re-injecting them into a painful joint.  PRP uses a component of your own blood, platelets, that have been  from your blood, concentrated, and injected into your knee. The platelets contain growth factors thought to be helpful in reducing the symptoms of inflammation.   Stem cells are precursor cells that can also be taken from your own body and injected into your knee. Since they are basic cells, they may have potential to grow into new tissue and thus heal damaged joint surfaces.  While both treatments show promise, clinical studies have yet to confirm their value in treating osteoarthritis.    Surgical Treatment  Your doctor may recommend surgery if your pain from arthritis causes disability and is not relieved with nonsurgical treatment. As with all surgeries, there are some risks and possible complications with different knee procedures. Your doctor will discuss the possible complications with you before your operation.    Arthroscopy  During arthroscopy, doctors use small incisions and thin instruments to diagnose and treat joint problems. Arthroscopic surgery is not often used to treat arthritis of the knee. In cases where osteoarthritis is accompanied by a degenerative meniscal tear, arthroscopic surgery may be recommended to treat the torn meniscus.    Cartilage grafting  Normal, healthy cartilage  tissue may be taken from another part of the knee or from a tissue bank to fill a hole in the articular cartilage. This procedure is typically considered only for younger patients who have small areas of cartilage damage.    Synovectomy  The joint lining damaged by rheumatoid arthritis is removed to reduce pain and swelling.    Osteotomy   In a knee osteotomy, either the tibia (shinbone) or femur (thighbone) is cut and then reshaped to relieve pressure on the knee joint. Knee osteotomy is used when you have early-stage osteoarthritis that has damaged just one side of the knee joint. By shifting your weight off the damaged side of the joint, an osteotomy can relieve pain and significantly improve function in your arthritic knee.    Total or partial knee replacement (arthroplasty)   Your doctor will remove the damaged cartilage and bone, and then position new metal or plastic joint surfaces to restore the function of your knee.        Recovery  After any type of surgery for arthritis of the knee, there is a period of recovery. Recovery time and rehabilitation depends on the type of surgery performed. Your doctor may recommend physical therapy to help you regain strength in your knee and to restore range of motion. Depending upon your procedure, you may need to wear a knee brace, or use crutches or a cane for a time. In most cases, surgery relieves pain and makes it possible to perform daily activities more easily.    Links  [employer] Clinical Practice Guideline on the Management of Osteoarthritis of the Knee  Knee Osteoarthritis  Knee Conditioning Program        STRETCHING EXERCISES            STRENGTHENING EXERCISES                  If you have any difficulties reading this information, you may visit the online version using the following link: Knee Conditioning Program ([email or web address].aaos.org/globalassets/[email or web address]_[email or web address])  1

## 2024-02-27 ENCOUNTER — PATIENT MESSAGE (OUTPATIENT)
Dept: INTERNAL MEDICINE | Facility: CLINIC | Age: 49
End: 2024-02-27

## 2024-02-27 ENCOUNTER — OFFICE VISIT (OUTPATIENT)
Dept: INTERNAL MEDICINE | Facility: CLINIC | Age: 49
End: 2024-02-27
Payer: COMMERCIAL

## 2024-02-27 DIAGNOSIS — I10 PRIMARY HYPERTENSION: ICD-10-CM

## 2024-02-27 DIAGNOSIS — E66.01 MORBID OBESITY WITH BMI OF 50.0-59.9, ADULT: ICD-10-CM

## 2024-02-27 DIAGNOSIS — K21.9 GASTROESOPHAGEAL REFLUX DISEASE WITHOUT ESOPHAGITIS: ICD-10-CM

## 2024-02-27 DIAGNOSIS — R73.03 PREDIABETES: Primary | ICD-10-CM

## 2024-02-27 DIAGNOSIS — E78.2 MIXED HYPERLIPIDEMIA: ICD-10-CM

## 2024-02-27 PROCEDURE — 3044F HG A1C LEVEL LT 7.0%: CPT | Mod: CPTII,95,, | Performed by: FAMILY MEDICINE

## 2024-02-27 PROCEDURE — 1159F MED LIST DOCD IN RCRD: CPT | Mod: CPTII,95,, | Performed by: FAMILY MEDICINE

## 2024-02-27 PROCEDURE — 1160F RVW MEDS BY RX/DR IN RCRD: CPT | Mod: CPTII,95,, | Performed by: FAMILY MEDICINE

## 2024-02-27 PROCEDURE — 99214 OFFICE O/P EST MOD 30 MIN: CPT | Mod: 95,,, | Performed by: FAMILY MEDICINE

## 2024-02-27 PROCEDURE — 4010F ACE/ARB THERAPY RXD/TAKEN: CPT | Mod: CPTII,95,, | Performed by: FAMILY MEDICINE

## 2024-02-27 RX ORDER — SEMAGLUTIDE 0.68 MG/ML
0.25 INJECTION, SOLUTION SUBCUTANEOUS
Qty: 3 ML | Refills: 3 | Status: SHIPPED | OUTPATIENT
Start: 2024-02-27 | End: 2024-02-27 | Stop reason: ALTCHOICE

## 2024-02-27 RX ORDER — TIRZEPATIDE 2.5 MG/.5ML
2.5 INJECTION, SOLUTION SUBCUTANEOUS
Qty: 4 PEN | Refills: 1 | Status: SHIPPED | OUTPATIENT
Start: 2024-02-27 | End: 2024-04-09

## 2024-02-27 NOTE — PROGRESS NOTES
Subjective:       Patient ID: Dylan Ariza is a 48 y.o. male.    Chief Complaint: No chief complaint on file.    48-year-old  male patient with Patient Active Problem List:     Morbid obesity with BMI of 50.0-59.9, adult     Gastroesophageal reflux disease without esophagitis     Intestinal malabsorption     Testosterone deficiency     Mixed hyperlipidemia     Central sleep apnea due to medical condition     Prolactin-secreting pituitary adenoma     Prediabetes     Primary hypertension  Here to discuss about few medications, reports that he has been taking his blood pressure medication regularly and denies any symptoms like headache or vision disturbances and has not been monitoring his blood pressures  Patient would like to lose weight and requesting zepbound, willing to pay out-of-pocket  Denies any extreme fatigue      Review of Systems   Constitutional:  Positive for unexpected weight change. Negative for activity change, appetite change and fatigue.   HENT:  Negative for hearing loss, rhinorrhea and trouble swallowing.    Eyes:  Negative for discharge and visual disturbance.   Respiratory:  Negative for chest tightness, shortness of breath and wheezing.    Cardiovascular:  Negative for chest pain, palpitations and leg swelling.   Gastrointestinal:  Negative for abdominal pain, blood in stool, constipation, diarrhea, nausea and vomiting.   Endocrine: Negative for polydipsia and polyuria.   Genitourinary:  Negative for difficulty urinating, hematuria and urgency.   Musculoskeletal:  Negative for arthralgias, joint swelling and myalgias.   Skin:  Negative for rash.   Neurological:  Negative for weakness and headaches.   Psychiatric/Behavioral:  Negative for confusion, dysphoric mood and sleep disturbance.          There were no vitals taken for this visit.  Objective:      Physical Exam  Neurological:      Mental Status: He is alert and oriented to person, place, and time.   Psychiatric:          Mood and Affect: Mood normal.           Assessment/Plan:   1. Prediabetes  -     Discontinue: semaglutide (OZEMPIC) 0.25 mg or 0.5 mg (2 mg/3 mL) pen injector; Inject 0.25 mg into the skin every 7 days.  Dispense: 3 mL; Refill: 3  -     tirzepatide, weight loss, (ZEPBOUND) 2.5 mg/0.5 mL PnIj; Inject 2.5 mg into the skin every 7 days.  Dispense: 4 Pen; Refill: 1  Will send zepbound 2.5 mg as requested by the patient  Reviewed recent labs showing prediabetes  Encouraged to restrict carbohydrates and exercise 30 minutes daily    2. Mixed hyperlipidemia  -     Lipid Panel; Future; Expected date: 02/27/2024  Diet controlled  Encouraged to do fasting labs prior to next visit    3. Morbid obesity with BMI of 50.0-59.9, adult  -     Discontinue: semaglutide (OZEMPIC) 0.25 mg or 0.5 mg (2 mg/3 mL) pen injector; Inject 0.25 mg into the skin every 7 days.  Dispense: 3 mL; Refill: 3  -     tirzepatide, weight loss, (ZEPBOUND) 2.5 mg/0.5 mL PnIj; Inject 2.5 mg into the skin every 7 days.  Dispense: 4 Pen; Refill: 1  Strict lifestyle changes recommended with diet and exercise to lose weight with BMI 55    4. Gastroesophageal reflux disease without esophagitis  Stable    5. Primary hypertension  Currently on losartan 50 mg daily  Encouraged to make an appointment with me in 4 weeks for follow-up on blood pressure as well as physicals  Patient to do fasting lipid profile prior to next visit

## 2024-02-28 ENCOUNTER — TELEPHONE (OUTPATIENT)
Dept: INTERNAL MEDICINE | Facility: CLINIC | Age: 49
End: 2024-02-28
Payer: COMMERCIAL

## 2024-03-25 ENCOUNTER — PATIENT MESSAGE (OUTPATIENT)
Dept: NEUROSURGERY | Facility: CLINIC | Age: 49
End: 2024-03-25
Payer: COMMERCIAL

## 2024-04-01 ENCOUNTER — OFFICE VISIT (OUTPATIENT)
Dept: SPORTS MEDICINE | Facility: CLINIC | Age: 49
End: 2024-04-01
Payer: COMMERCIAL

## 2024-04-01 DIAGNOSIS — M17.12 PRIMARY OSTEOARTHRITIS OF LEFT KNEE: Primary | ICD-10-CM

## 2024-04-01 PROCEDURE — 1160F RVW MEDS BY RX/DR IN RCRD: CPT | Mod: CPTII,S$GLB,, | Performed by: PHYSICIAN ASSISTANT

## 2024-04-01 PROCEDURE — 1159F MED LIST DOCD IN RCRD: CPT | Mod: CPTII,S$GLB,, | Performed by: PHYSICIAN ASSISTANT

## 2024-04-01 PROCEDURE — 3044F HG A1C LEVEL LT 7.0%: CPT | Mod: CPTII,S$GLB,, | Performed by: PHYSICIAN ASSISTANT

## 2024-04-01 PROCEDURE — 99999 PR PBB SHADOW E&M-EST. PATIENT-LVL III: CPT | Mod: PBBFAC,,, | Performed by: PHYSICIAN ASSISTANT

## 2024-04-01 PROCEDURE — 4010F ACE/ARB THERAPY RXD/TAKEN: CPT | Mod: CPTII,S$GLB,, | Performed by: PHYSICIAN ASSISTANT

## 2024-04-01 PROCEDURE — 99213 OFFICE O/P EST LOW 20 MIN: CPT | Mod: S$GLB,,, | Performed by: PHYSICIAN ASSISTANT

## 2024-04-01 RX ORDER — DICLOFENAC SODIUM 75 MG/1
75 TABLET, DELAYED RELEASE ORAL 2 TIMES DAILY
Qty: 60 TABLET | Refills: 1 | Status: SHIPPED | OUTPATIENT
Start: 2024-04-01

## 2024-04-01 NOTE — PROGRESS NOTES
Orthopaedic Follow-Up Visit    Last Appointment:  02/21/2024  Diagnosis:  Primary osteoarthritis of the left knee  Prior Procedure:  Left knee CSI 02/21/2024    Dylan Ariza is a 49 y.o. male who is here for f/u evaluation of left knee pain. The patient was last seen here by me on 02/21/2024 at which point we decided to try a left knee CSI and a home exercise program prior to considering further treatment options. The patient returns today reporting that the symptoms with the previously administered corticosteroid injection but I have since returned and is interested in proceeding with further treatment options.  When asked if he had any recent injury or trauma, he reports no; however, he does recall a fall he sustained while at work in October and he believes this is contributing to his knee pain    To review his history, Dylan Ariza is a 48 y.o. year old male patient who initially presented to clinic on 02/21/2024 for left knee pain that began about 3 months prior with no acute injury or trauma. His symptoms include posterior knee pain, morning stiffness, night pain. His pain is made worse by prolonged standing, prolonged ambulation, going up and downstairs. His treatment has included rest, activity modification, oral anti-inflammatories, oral diclofenac tablets, corticosteroid injection, home exercise program    Patient's medications, allergies, past medical, surgical, social and family histories were reviewed and updated as appropriate.    Review of Systems   All systems reviewed were negative.  Specifically, the patient denies fever, chills, weight loss, chest pain, shortness of breath, or dyspnea on exertion.      Past Medical History:   Diagnosis Date    Chronic back pain     Chronic cough     GERD (gastroesophageal reflux disease)     Globus sensation     Insulin resistance     Mixed hyperlipidemia 12/13/2018    Prediabetes     Prolactin-secreting pituitary adenoma     Testosterone  deficiency     Vitamin D deficiency        Objective:      Physical Exam  Patient is alert and oriented, no distress. Skin is intact. Neuro is normal with no focal motor or sensory findings.    Standing exam   stance: normal alignment, no significant leg-length discrepancy  gait: normal     Knee                                                  RIGHT             LEFT  Skin:                                         Intact               Intact  ROM:                                        0-130              0-130  Effusion:                                   Neg                  Neg  Medial joint line tenderness:    Neg                  +  Lateral joint line tenderness:    Neg                  Neg  Loida:                                Neg                  +  Patella crepitus:                       Neg                  +  Patella tenderness:                  Neg                  Neg  Patella grind:                            Neg                  Neg  Lachman:                                 Neg                  Neg  Valgus stress:                          Neg                  Neg  Varus stress:                            Neg                  Neg  Posterior drawer:                     Neg                  Neg  N-V                                          intact               intact  Hip:                                          nml                    nml              Lower extremity edema:         Negative          negative       Neurovascular exam  - motor function grossly intact bilaterally to hip flexion, knee extension and flexion, ankle dorsiflexion and plantarflexion  - sensation intact to light touch bilaterally to femoral, tibial, tibial and peroneal distributions  - symmetrical pedal pulses    Imaging:   XR Results:  Results for orders placed during the hospital encounter of 01/17/24    X-ray Knee Ortho Left    Narrative  EXAMINATION:  XR KNEE ORTHO LEFT    CLINICAL HISTORY:  Pain in left knee    TECHNIQUE:  AP  standing views of both knees, AP flexion views of both knees, lateral view of the left knee and Merchant views of both knees    COMPARISON:  None    FINDINGS:  There is mild joint space narrowing minimal osteophyte formation seen involving the medial and patellofemoral compartments of the left knee.  No joint effusion.  No acute fracture or dislocation.    Impression  1.  As above      Electronically signed by: Jordin Ferraro DO  Date:    01/17/2024  Time:    09:59      Physician Read: I agree with the above impression.There is medial joint space loss present within the left knee as well as marginal osteophyte formation consistent with a Kellgren Jair grade 2     Assessment/Plan:   Assessment:  Dylan Ariza is a 49 y.o. male with primary osteoarthritis of the left knee    Plan:    And treatment options with the patient today.  At last visit he was diagnosed with primary osteoarthritis of the left knee and we elected to move forward with a corticosteroid injection.  He reports that he had initial improvement of his pain with the previously administered corticosteroid injection; however, his pain has since returned  I discussed moving forward with a prior authorization for viscosupplementation; however, I would like to move forward with a MRI of his left knee first to rule out any internal derangement as he has failed greater than 6 weeks of conservative treatment.  MRI scheduled for 04/04/2024  He previously was taking diclofenac tablets with slight relief of symptoms, he is on at this time. Refilled diclofenac 75 mg tablets, okay to take twice a day with meals  Follow-up with me virtually after MRI to finalize treatment plan, if only degenerative changes found on MRI, we will move forward with prior authorization for Visco          Svetlana Solo PA-C  Sports Medicine Physician Assistant       Disclaimer: This note was prepared using a voice recognition system and is likely to have sound alike  errors within the text.

## 2024-04-01 NOTE — LETTER
April 1, 2024      [facility]  [address] [facility]  [place] [address]  Phone: [phone]  Fax: [phone]       Patient: Dylan Ariza   YOB: 1975  Date of Visit: 04/01/2024    To Whom It May Concern:    Mi Ariza  was at [facility] on 04/01/2024. The patient may return to work/school on 4/2/2024 with no restrictions. If you have any questions or concerns, or if I can be of further assistance, please do not hesitate to contact me.    Sincerely,        Svetlana Solo PA-C

## 2024-04-04 ENCOUNTER — HOSPITAL ENCOUNTER (OUTPATIENT)
Dept: RADIOLOGY | Facility: HOSPITAL | Age: 49
Discharge: HOME | End: 2024-04-04
Attending: PHYSICIAN ASSISTANT
Payer: COMMERCIAL

## 2024-04-04 DIAGNOSIS — M17.12 PRIMARY OSTEOARTHRITIS OF LEFT KNEE: ICD-10-CM

## 2024-04-04 PROCEDURE — 73721 MRI JNT OF LWR EXTRE W/O DYE: CPT | Mod: 26,LT,, | Performed by: RADIOLOGY

## 2024-04-04 PROCEDURE — 73721 MRI JNT OF LWR EXTRE W/O DYE: CPT | Mod: TC,LT

## 2024-04-09 ENCOUNTER — OFFICE VISIT (OUTPATIENT)
Dept: SPORTS MEDICINE | Facility: CLINIC | Age: 49
End: 2024-04-09
Payer: COMMERCIAL

## 2024-04-09 DIAGNOSIS — M22.8X2 PATELLAR TRACKING DISORDER OF LEFT KNEE: ICD-10-CM

## 2024-04-09 DIAGNOSIS — M17.12 PRIMARY OSTEOARTHRITIS OF LEFT KNEE: Primary | ICD-10-CM

## 2024-04-09 DIAGNOSIS — M23.201 OTHER OLD TEAR OF LATERAL MENISCUS OF LEFT KNEE: ICD-10-CM

## 2024-04-09 PROCEDURE — 4010F ACE/ARB THERAPY RXD/TAKEN: CPT | Mod: CPTII,95,, | Performed by: PHYSICIAN ASSISTANT

## 2024-04-09 PROCEDURE — 1160F RVW MEDS BY RX/DR IN RCRD: CPT | Mod: CPTII,95,, | Performed by: PHYSICIAN ASSISTANT

## 2024-04-09 PROCEDURE — 3044F HG A1C LEVEL LT 7.0%: CPT | Mod: CPTII,95,, | Performed by: PHYSICIAN ASSISTANT

## 2024-04-09 PROCEDURE — 99211 OFF/OP EST MAY X REQ PHY/QHP: CPT | Mod: 95,,, | Performed by: PHYSICIAN ASSISTANT

## 2024-04-09 PROCEDURE — 1159F MED LIST DOCD IN RCRD: CPT | Mod: CPTII,95,, | Performed by: PHYSICIAN ASSISTANT

## 2024-04-09 NOTE — PROGRESS NOTES
Telemedicine Visit  The patient location is: [place], patients home  The chief complaint leading to consultation is: see HPI    Each patient to whom he or she provides medical services by telemedicine is:  (1) informed of the relationship between the physician and patient and the respective role of any other health care provider with respect to management of the patient; and (2) notified that he or she may decline to receive medical services by telemedicine and may withdraw from such care at any time.The patient location is: home     VISIT TYPE X   Virtual visit with synchronous audio and video  X   Telephone E/M service        Patient ID: Dylan Ariza  YOB: 1975  MRN: [id number]    Chief Complaint: No chief complaint on file.      History of Present Illness: Dylan Ariza is a 49 y.o. male here today virtually for an MRI review of his left knee.  He was last seen by me on 04/01/2024 at which point I elected to move forward with an MRI of his left knee due to him failing greater than 6 weeks of conservative treatment into rule out a meniscal injury    To review his history, Dylan Ariza is a 48 y.o. year old male patient who initially presented to clinic on 02/21/2024 for left knee pain that began about 3 months prior with no acute injury or trauma. His symptoms include posterior knee pain, morning stiffness, night pain. His pain is made worse by prolonged standing, prolonged ambulation, going up and downstairs. His treatment has included rest, activity modification, oral anti-inflammatories, oral diclofenac tablets, corticosteroid injection, home exercise program       Past Medical History:   Past Medical History:   Diagnosis Date    Chronic back pain     Chronic cough     GERD (gastroesophageal reflux disease)     Globus sensation     Insulin resistance     Mixed hyperlipidemia 12/13/2018    Prediabetes     Prolactin-secreting pituitary adenoma     Testosterone  deficiency     Vitamin D deficiency      Past Surgical History:   Procedure Laterality Date    SLEEVE GASTROPLASTY       Family History   Problem Relation Age of Onset    Schizophrenia Mother     Heart disease Father     Hypertension Father     Stroke Father     Heart failure Father     No Known Problems Sister     No Known Problems Sister     No Known Problems Sister     No Known Problems Sister     No Known Problems Sister     Cancer Paternal Grandmother     No Known Problems Daughter     Diabetes Neg Hx     Thyroid cancer Neg Hx         MEN2     Social History     Socioeconomic History    Marital status: Single   Occupational History    Occupation:    Tobacco Use    Smoking status: Light Smoker     Current packs/day: 0.00     Types: Cigars, Cigarettes     Last attempt to quit: 2000     Years since quittin.1    Smokeless tobacco: Never    Tobacco comments:     smoked 5 cigars/week   Substance and Sexual Activity    Alcohol use: Yes     Alcohol/week: 4.0 standard drinks of alcohol     Types: 4 Shots of liquor per week     Comment: 2 pints/ weekend    Drug use: No    Sexual activity: Not Currently     Partners: Female   Social History Narrative    Live w/ girlfriend      Social Determinants of Health     Financial Resource Strain: Low Risk  (2024)    Overall Financial Resource Strain (CARDIA)     Difficulty of Paying Living Expenses: Not hard at all   Food Insecurity: No Food Insecurity (2024)    Hunger Vital Sign     Worried About Running Out of Food in the Last Year: Never true     Ran Out of Food in the Last Year: Never true   Transportation Needs: No Transportation Needs (2024)    PRAPARE - Transportation     Lack of Transportation (Medical): No     Lack of Transportation (Non-Medical): No   Physical Activity: Sufficiently Active (2024)    Exercise Vital Sign     Days of Exercise per Week: 5 days     Minutes of Exercise per Session: 90 min   Stress: No Stress Concern  Present (1/16/2024)    [place] [employer] - Occupational Stress Questionnaire     Feeling of Stress : Not at all   Social Connections: Unknown (1/16/2024)    Social Connection and Isolation Panel [NHANES]     Frequency of Communication with Friends and Family: More than three times a week     Frequency of Social Gatherings with Friends and Family: Patient declined     Active Member of Clubs or Organizations: No     Attends Club or Organization Meetings: Patient declined     Marital Status: Never    Housing Stability: High Risk (1/16/2024)    Housing Stability Vital Sign     Unable to Pay for Housing in the Last Year: Yes     Number of Places Lived in the Last Year: 2     Unstable Housing in the Last Year: No     Medication List with Changes/Refills   Current Medications    DICLOFENAC (VOLTAREN) 75 MG EC TABLET    Take 1 tablet (75 mg total) by mouth 2 (two) times daily.    LOSARTAN (COZAAR) 50 MG TABLET    Take 1 tablet (50 mg total) by mouth once daily.    NAPROXEN (NAPROSYN) 500 MG TABLET    Take 1 tablet (500 mg total) by mouth 2 (two) times daily as needed.    TIRZEPATIDE, WEIGHT LOSS, (ZEPBOUND) 2.5 MG/0.5 ML PNIJ    Inject 2.5 mg into the skin every 7 days.     Review of patient's allergies indicates:  No Known Allergies  ROS    Physical Exam:   NEURO: Awake, alert, and oriented x 3.  PSYCH: Mood & affect are appropriate.  Ortho/SPM Exam  Limited Physical Exam due to Telemedicine Visit      Imaging:  EXAMINATION:  XR KNEE ORTHO LEFT     CLINICAL HISTORY:  Pain in left knee     TECHNIQUE:  AP standing views of both knees, AP flexion views of both knees, lateral view of the left knee and Merchant views of both knees     COMPARISON:  None     FINDINGS:  There is mild joint space narrowing minimal osteophyte formation seen involving the medial and patellofemoral compartments of the left knee.  No joint effusion.  No acute fracture or dislocation.     Impression  1.  As above         Electronically signed by:     Jordin DO Ronan  Date:                                                01/17/2024  Time:                                               09:59    MRI Knee Without Contrast Left  Narrative: EXAM:  MRI KNEE WITHOUT CONTRAST LEFT    CLINICAL HISTORY: Left knee pain. Knee pain, chronic, negative xray (Age >= 5y); [M17.12]-Unilateral primary osteoarthritis, left knee. TechNotes: Abdomen coil used due to patient large body habitus, multiple repeats, propellor sequences utilized due to patient motion    TECHNIQUE: Noncontrast multiplanar, multisequence MR imaging protocol of the left knee was performed.    FINDINGS:  MENISCI:  Medial: No discernible tear.    Lateral: Small low-grade free edge radial-type tear of the anterior junctional zone.  Low-grade free edge fraying of the anterior horn.    CRUCIATE LIGAMENTS: Intact.    COLLATERAL LIGAMENTS: Intact.    Extensor mechanism, patellar retinaculum/MPFL: Unremarkable.    Remaining major supporting soft tissue structures: Within normal limits.    CARTILAGE:  Patellofemoral compartment: Lateral patellar subluxation.  Generalized patellar chondral thinning with multiple small partial-thickness chondral erosions.  Medial compartment: Generalized chondral thinning of the weightbearing surfaces with a 1.1 cm full-thickness chondral defect medial femoral condyle central weightbearing surface.  Lateral compartment: Full-thickness chondral defects of the posterior falx tibial plateau and posterior lateral femoral condyle with subchondral cystic change.    BONES: Subchondral marrow edema of the medial tibial plateau.  No fracture identified.  No osteochondral defects.  No suspicious bone lesions.    FLUID: Small joint effusion. 2 cm Baker's cyst.  Impression: 1.  Small radial tear lateral meniscus anterior junctional zone.  Low-grade free edge fraying lateral meniscus anterior horn.  2.  Tricompartmental chondral degeneration, as above.  3.  Marrow  edema medial tibial plateau without evidence of fracture.  4.  Lateral patellar subluxation.    Finalized on: 4/4/2024 1:52 PM By:  Robson Lee MD  [facility]# [id number]      2024-04-04 13:54:[id number]    [facility]    No new radiographic images obtained at today's visit. Previous images reviewed at this time.    Relevant imaging results reviewed and interpreted by me, discussed with the patient and / or family today.      Other Tests:     No other tests performed today.    There are no Patient Instructions on file for this visit.  Provider Note/Medical Decision Making:     Reviewed left knee MRI with the patient today. Discussed that his MRI did show that he does have a radial meniscus tear within his left knee. It also showed that he has tricompartmental osteoarthritis, worse in the weight-bearing portion of his medial compartment. He has full-thickness cartilage loss present in the weight-bearing portion of his medial compartment.  I discussed operative and nonoperative treatment options with the patient today.  Operative treatment would include a lateral meniscectomy of his radial meniscus tear.  Nonoperative treatment to include rest, activity modification, formal physical therapy vs hep, intermittent corticosteroid injection, viscosupplementation.  I discussed with him that I do think we should avoid operative management for the time being, as his meniscus tear is small and I would like to keep as much cushion in his knees as possible  Instead, I recommend we move forward with a prior authorization for viscosupplementation  Prior authorization placed for left knee Synvisc  MEDICAL NECESSITY FOR VISCOSUPPLEMENTATION: After thorough evaluation of the patient, I have determined that visco-supplementation is medically necessary. The patient has painful DJD of the knee with failure of conservative therapy including lifestyle modifications and rehabilitation exercises. Oral analgesis/NSAIDs have not adequately controlled symptoms  and there is radiographic evidence of joint space narrowing, subchondral sclerosis, and some early osteophytic changes Kellgren- Jair grade 2 or greater, or in lack of radiographic evidence, there is arthroscopic or other evidence of chondrosis.  Follow up with me in about 1 month for left knee Visco    Total time spent with patient: see X tashia on chart below.   More than half of the time was spent counseling or coordinating care including prognosis, differential diagnosis, risks and benefits of treatment, instructions, compliance risk reductions     VIDEO EST MINUTES X Min   75030 5 x 5   29951 10     78009 15     61807 25     75103 40     VIDEO NEW      48479 10     22371 20     88242 30     18740 45     93174 60     PHONE      13665 5-10     34313 11-20     58347 21-30             Svetlana Solo PA-C  Sports Medicine Physician Assistant       Disclaimer: This note was prepared using a voice recognition system and is likely to have sound alike errors within the text.

## 2024-04-12 ENCOUNTER — TELEPHONE (OUTPATIENT)
Dept: NEUROSURGERY | Facility: CLINIC | Age: 49
End: 2024-04-12
Payer: COMMERCIAL

## 2024-04-12 ENCOUNTER — OFFICE VISIT (OUTPATIENT)
Dept: NEUROSURGERY | Facility: CLINIC | Age: 49
End: 2024-04-12
Payer: COMMERCIAL

## 2024-04-12 VITALS
HEART RATE: 119 BPM | BODY MASS INDEX: 47.74 KG/M2 | RESPIRATION RATE: 18 BRPM | DIASTOLIC BLOOD PRESSURE: 95 MMHG | HEIGHT: 68 IN | SYSTOLIC BLOOD PRESSURE: 149 MMHG | WEIGHT: 315 LBS

## 2024-04-12 DIAGNOSIS — D35.2 PROLACTIN-SECRETING PITUITARY ADENOMA: Primary | ICD-10-CM

## 2024-04-12 PROCEDURE — 4010F ACE/ARB THERAPY RXD/TAKEN: CPT | Mod: CPTII,S$GLB,, | Performed by: PHYSICIAN ASSISTANT

## 2024-04-12 PROCEDURE — 99213 OFFICE O/P EST LOW 20 MIN: CPT | Mod: S$GLB,,, | Performed by: PHYSICIAN ASSISTANT

## 2024-04-12 PROCEDURE — 3008F BODY MASS INDEX DOCD: CPT | Mod: CPTII,S$GLB,, | Performed by: PHYSICIAN ASSISTANT

## 2024-04-12 PROCEDURE — 3080F DIAST BP >= 90 MM HG: CPT | Mod: CPTII,S$GLB,, | Performed by: PHYSICIAN ASSISTANT

## 2024-04-12 PROCEDURE — 3044F HG A1C LEVEL LT 7.0%: CPT | Mod: CPTII,S$GLB,, | Performed by: PHYSICIAN ASSISTANT

## 2024-04-12 PROCEDURE — 3077F SYST BP >= 140 MM HG: CPT | Mod: CPTII,S$GLB,, | Performed by: PHYSICIAN ASSISTANT

## 2024-04-12 NOTE — PROGRESS NOTES
Neurosurgery History and Physical    Patient ID: Dylan Ariza is a 49 y.o. male.    Chief Complaint   Patient presents with    [facility]       HPI 24:    Patient is a 49 year old male who presents to [facility] clinic to have a prescription of Cabergoline filled for his macro-prolactinoma originally seen by Dr. Ashish Mohr in [place]. He states he last had this medication a few years ago and was being prescribed by a different specialist, but is unsure of the specialty. He reports the medication was helping with his symptoms and was shrinking the mass. He was originally diagnosed in  by Dr. Connors who was working him up for erectile dysfunction and low libido.     Since being off of the medication for a few years now, he feels like his symptoms of ED and low libido are returning. He has no headache, dizziness, acute changes in vision, confusion, numbness, tingling, or weakness.     Review of Systems   Eyes:  Negative for photophobia and visual disturbance.   Neurological:  Negative for dizziness, tremors, speech difficulty, weakness, light-headedness, numbness and headaches.   Psychiatric/Behavioral:  Negative for confusion.    All other systems reviewed and are negative.      Past Medical History:   Diagnosis Date    Chronic back pain     Chronic cough     GERD (gastroesophageal reflux disease)     Globus sensation     Insulin resistance     Mixed hyperlipidemia 2018    Prediabetes     Prolactin-secreting pituitary adenoma     Testosterone deficiency     Vitamin D deficiency      Social History     Socioeconomic History    Marital status: Single   Occupational History    Occupation:    Tobacco Use    Smoking status: Light Smoker     Current packs/day: 0.00     Types: Cigars, Cigarettes     Last attempt to quit: 2000     Years since quittin.1    Smokeless tobacco: Never    Tobacco comments:     smoked 5 cigars/week   Substance and Sexual Activity    Alcohol use: Yes      Alcohol/week: 4.0 standard drinks of alcohol     Types: 4 Shots of liquor per week     Comment: 2 pints/ weekend    Drug use: No    Sexual activity: Not Currently     Partners: Female   Social History Narrative    Live w/ girlfriend      Social Determinants of Health     Financial Resource Strain: Low Risk  (1/16/2024)    Overall Financial Resource Strain (CARDIA)     Difficulty of Paying Living Expenses: Not hard at all   Food Insecurity: No Food Insecurity (1/16/2024)    Hunger Vital Sign     Worried About Running Out of Food in the Last Year: Never true     Ran Out of Food in the Last Year: Never true   Transportation Needs: No Transportation Needs (1/16/2024)    PRAPARE - Transportation     Lack of Transportation (Medical): No     Lack of Transportation (Non-Medical): No   Physical Activity: Sufficiently Active (1/16/2024)    Exercise Vital Sign     Days of Exercise per Week: 5 days     Minutes of Exercise per Session: 90 min   Stress: No Stress Concern Present (1/16/2024)    [place] of [employer]ational Health - Occupational Stress Questionnaire     Feeling of Stress : Not at all   Social Connections: Unknown (1/16/2024)    Social Connection and Isolation Panel [NHANES]     Frequency of Communication with Friends and Family: More than three times a week     Frequency of Social Gatherings with Friends and Family: Patient declined     Active Member of Clubs or Organizations: No     Attends Club or Organization Meetings: Patient declined     Marital Status: Never    Housing Stability: High Risk (1/16/2024)    Housing Stability Vital Sign     Unable to Pay for Housing in the Last Year: Yes     Number of Places Lived in the Last Year: 2     Unstable Housing in the Last Year: No     Family History   Problem Relation Age of Onset    Schizophrenia Mother     Heart disease Father     Hypertension Father     Stroke Father     Heart failure Father     No Known Problems Sister     No Known Problems Sister   "   No Known Problems Sister     No Known Problems Sister     No Known Problems Sister     Cancer Paternal Grandmother     No Known Problems Daughter     Diabetes Neg Hx     Thyroid cancer Neg Hx         MEN2     Review of patient's allergies indicates:  No Known Allergies    Current Outpatient Medications:     diclofenac (VOLTAREN) 75 MG EC tablet, Take 1 tablet (75 mg total) by mouth 2 (two) times daily., Disp: 60 tablet, Rfl: 1    losartan (COZAAR) 50 MG tablet, Take 1 tablet (50 mg total) by mouth once daily., Disp: 90 tablet, Rfl: 1    naproxen (NAPROSYN) 500 MG tablet, Take 1 tablet (500 mg total) by mouth 2 (two) times daily as needed., Disp: 30 tablet, Rfl: 0  Blood pressure (!) 149/95, pulse (!) 119, resp. rate 18, height 5' 8" (1.727 m), weight (!) 164.2 kg (361 lb 15.9 oz).      Neurologic Exam     Mental Status   Oriented to person, place, and time.   Attention: normal. Concentration: normal.   Speech: speech is normal   Level of consciousness: alert  Knowledge: good.   Normal comprehension.     Cranial Nerves     CN VII   Facial expression full, symmetric.     CN VIII   Hearing: intact    Motor Exam  Ambulates without assistance with grossly 5/5 strength throughout       Physical Exam  Vitals and nursing note reviewed.   Neurological:      Mental Status: He is oriented to person, place, and time.   Psychiatric:         Speech: Speech normal.         Vital Signs  Pulse: (!) 119  Resp: 18  BP: (!) 149/95  BP Location: Left forearm  Patient Position: Sitting  Pain Score: 0-No pain  Height and Weight  Height: 5' 8" (172.7 cm)  Weight: (!) 164.2 kg (361 lb 15.9 oz)  BSA (Calculated - sq m): 2.81 sq meters  BMI (Calculated): 55.1  Weight in (lb) to have BMI = 25: 164.1]    Provider dictation:    MRI pituitary 2020 was reviewed and briefly discussed with Dr. Huerta.     Discussed that the neurosurgeons in this office do not prescribe Cabergoline, but we could refer him to an endocrinologist as well as " update his imaging and his labs as none have been performed since 2020.     Patient left exam room before formal physical exam could be performed as he felt we could not help him with his problem.    Tried to reassure patient that we could help in monitoring his macro-prolactinoma, would update imaging and labs needed while getting him set up with endocrine. He did not want any referrals placed, images or labs ordered and left the office.     Visit Diagnosis:  Prolactin-secreting pituitary adenoma

## 2024-04-12 NOTE — TELEPHONE ENCOUNTER
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

## 2024-04-12 NOTE — TELEPHONE ENCOUNTER
----- Message from Shawnee Shrestha sent at 4/12/2024  2:25 PM CDT -----  Regarding: Refill Request  Contact: Pt @ [phone]  Pt is calling to speak to someone in the office to request a refill for cabergoline (DOSTINEX) 0.5 mg tablet. Pt states the last provider that filled the script is no longer with [facility].Pt is asking for a return call back. Thanks.     Additional Info: Pt has ran out of the rx awhile ago and wanted to know can it get refilled before 4/22 appt.     [facility]/pharmacy #[id number] - [place] - [address].  [address]  [place] [address]  Phone: [phone] Fax: [phone]

## 2024-04-12 NOTE — TELEPHONE ENCOUNTER
Contacted the patient about scheduling an appt for brain tumor dx. He has been advised that Dr. Araiza does not treat cranio dx and was provided with the [facility] contact info.

## 2024-04-12 NOTE — TELEPHONE ENCOUNTER
----- Message from Lorna Hancock sent at 4/12/2024  2:18 PM CDT -----  Contact: Dylan Richardson is needing a call back in regards to scheduling an appt for a brain tumor. He sttaed that he was a patient of Sheldon Carmichael. Please give him a call back at [phone]

## 2024-04-12 NOTE — TELEPHONE ENCOUNTER
----- Message from Shawnee Shrestha sent at 4/12/2024  2:44 PM CDT -----  Regarding: RE: Refill Request  Contact: Pt @ [phone]  Magdaleno Tejeda,    Yes I know, he will be a NP on 4/22 with Dr. Diez. The question he wanted to know is if he can get a refill or does he need to be seen first.  ----- Message -----  From: Katherin Cabrera RN  Sent: 4/12/2024   2:33 PM CDT  To: Shawnee Humphreyo  Subject: RE: Refill Request                               He was not a pt here.  ----- Message -----  From: Shawnee Shrestha  Sent: 4/12/2024   2:29 PM CDT  To: Chary Elam Staff  Subject: Refill Request                                   Pt is calling to speak to someone in the office to request a refill for cabergoline (DOSTINEX) 0.5 mg tablet. Pt states the last provider that filled the script is no longer with [facility].Pt is asking for a return call back. Thanks.     Additional Info: Pt has ran out of the rx awhile ago and wanted to know can it get refilled before 4/22 appt.     [facility]/pharmacy #[id number] - [place] - [address]  [address] [place] [address]  Phone: [phone] Fax: [phone]

## 2024-04-19 ENCOUNTER — TELEPHONE (OUTPATIENT)
Dept: NEUROSURGERY | Facility: CLINIC | Age: 49
End: 2024-04-19
Payer: COMMERCIAL

## 2024-04-19 ENCOUNTER — PATIENT MESSAGE (OUTPATIENT)
Dept: INTERNAL MEDICINE | Facility: CLINIC | Age: 49
End: 2024-04-19
Payer: COMMERCIAL

## 2024-04-19 ENCOUNTER — TELEPHONE (OUTPATIENT)
Dept: INTERNAL MEDICINE | Facility: CLINIC | Age: 49
End: 2024-04-19
Payer: COMMERCIAL

## 2024-04-19 NOTE — TELEPHONE ENCOUNTER
----- Message from Catrachita Ledbetter sent at 4/19/2024  3:42 PM CDT -----  Regarding: Rx Refill  Contact: [phone]  TIRSO SPARKS calling regarding Refills  (message) for #  cabergoline (DOSTINEX) 0.5 mg tablet    Pt needs his medication asap please he is out of medication     [id number] - [place], [place] - [address] [address]  [address] [address]  Phone: [phone] Fax: [phone]

## 2024-04-19 NOTE — TELEPHONE ENCOUNTER
Tried calling the patient with no answer. A message was left for a return call to Dr Ferraro office.

## 2024-04-19 NOTE — TELEPHONE ENCOUNTER
LVM informing pt we are happy to see him for pituitary tumor but we will not be able to refill the cabergoline. Gave endo number ([phone]). LCBN.

## 2024-05-08 ENCOUNTER — LAB VISIT (OUTPATIENT)
Dept: LAB | Facility: HOSPITAL | Age: 49
End: 2024-05-08
Attending: INTERNAL MEDICINE

## 2024-05-08 ENCOUNTER — OFFICE VISIT (OUTPATIENT)
Dept: ENDOCRINOLOGY | Facility: CLINIC | Age: 49
End: 2024-05-08

## 2024-05-08 VITALS
SYSTOLIC BLOOD PRESSURE: 120 MMHG | HEIGHT: 68 IN | WEIGHT: 315 LBS | DIASTOLIC BLOOD PRESSURE: 80 MMHG | RESPIRATION RATE: 18 BRPM | OXYGEN SATURATION: 94 % | HEART RATE: 98 BPM | BODY MASS INDEX: 47.74 KG/M2

## 2024-05-08 DIAGNOSIS — D35.2 PROLACTIN-SECRETING PITUITARY ADENOMA: ICD-10-CM

## 2024-05-08 DIAGNOSIS — D35.2 PROLACTIN-SECRETING PITUITARY ADENOMA: Primary | ICD-10-CM

## 2024-05-08 LAB — PROLACTIN SERPL IA-MCNC: 2.7 NG/ML (ref 3.5–19.4)

## 2024-05-08 PROCEDURE — G2211 COMPLEX E/M VISIT ADD ON: HCPCS | Mod: S$PBB,,, | Performed by: INTERNAL MEDICINE

## 2024-05-08 PROCEDURE — 36415 COLL VENOUS BLD VENIPUNCTURE: CPT | Performed by: INTERNAL MEDICINE

## 2024-05-08 PROCEDURE — 99999 PR PBB SHADOW E&M-EST. PATIENT-LVL IV: CPT | Mod: PBBFAC,,, | Performed by: INTERNAL MEDICINE

## 2024-05-08 PROCEDURE — 99204 OFFICE O/P NEW MOD 45 MIN: CPT | Mod: S$PBB,,, | Performed by: INTERNAL MEDICINE

## 2024-05-08 PROCEDURE — 84305 ASSAY OF SOMATOMEDIN: CPT | Performed by: INTERNAL MEDICINE

## 2024-05-08 PROCEDURE — 99214 OFFICE O/P EST MOD 30 MIN: CPT | Mod: PBBFAC | Performed by: INTERNAL MEDICINE

## 2024-05-08 PROCEDURE — 84146 ASSAY OF PROLACTIN: CPT | Performed by: INTERNAL MEDICINE

## 2024-05-08 RX ORDER — CABERGOLINE 0.5 MG/1
0.5 TABLET ORAL
Qty: 24 TABLET | Refills: 3 | Status: SHIPPED | OUTPATIENT
Start: 2024-05-09 | End: 2025-05-09

## 2024-05-08 NOTE — PROGRESS NOTES
ENDOCRINOLOGY VISIT  05/08/2024     Subjective:      CC: referred by Aaareferral Self   for evaluation and management of elevated prolactin/prolactinoma    HPI:   Antonio Melchor is a 40 y.o. male with prediabetes, hypertension who presents for evaluation of prolactinoma/elevated prolactin.  Currently on cabergoline 0.5 mg one tablet twice a week. Ran out of prescription for several weeks but was able to get a recent prescription for 8 tablets.    Initial presentation:   Reported low libido and erectile dysfunction   Headaches:    No   Loss of peripheral vision:  No   Galactorrhea:    No     Imaging:      MRI 2020    Formal Visual fields:   Six months ago    The patient is not currently using any medication known to cause hyperprolactinemia such as: antipsychotics (risperidone, phenothiazines, haloperidol and butyrophenones),gastric motility drugs (metoclopramide and domperidone), or antihypertensives (methyldopa, reserpine, and verapamil).      Current symptoms:  Single episodes of vomiting, no other symptoms. Denies symptoms of adrenal insufficiency (no lightheadedness, N/V/abd pain, hypotension).     Drinks water, no thirst as his job is outside. No symptoms of DI (denies polyuria/polydipsia).       Lab Results   Component Value Date    TSH 1.532 01/17/2024    FREET4 0.94 06/15/2020          Growth Hormone Excess:  []  increase hand/foot size []  teeth spacing change    []  Denies    []  worse glycemic control []  joint pain     []  Denies    []  hyperhidrosis    Past Medical History:   Diagnosis Date    Chronic back pain     Chronic cough     GERD (gastroesophageal reflux disease)     Globus sensation     Insulin resistance     Mixed hyperlipidemia 12/13/2018    Prediabetes     Prolactin-secreting pituitary adenoma     Testosterone deficiency     Vitamin D deficiency        Past Surgical History:   Procedure Laterality Date    SLEEVE GASTROPLASTY         Review of patient's allergies indicates:  No Known  Allergies      Current Outpatient Medications:     diclofenac (VOLTAREN) 75 MG EC tablet, Take 1 tablet (75 mg total) by mouth 2 (two) times daily., Disp: 60 tablet, Rfl: 1    losartan (COZAAR) 50 MG tablet, Take 1 tablet (50 mg total) by mouth once daily., Disp: 90 tablet, Rfl: 1    naproxen (NAPROSYN) 500 MG tablet, Take 1 tablet (500 mg total) by mouth 2 (two) times daily as needed., Disp: 30 tablet, Rfl: 0    [START ON 2024] cabergoline (DOSTINEX) 0.5 mg tablet, Take 1 tablet (0.5 mg total) by mouth twice a week., Disp: 24 tablet, Rfl: 3    Social History     Socioeconomic History    Marital status: Single   Occupational History    Occupation:    Tobacco Use    Smoking status: Light Smoker     Current packs/day: 0.00     Types: Cigars, Cigarettes     Last attempt to quit: 2000     Years since quittin.2    Smokeless tobacco: Never    Tobacco comments:     smoked 5 cigars/week   Substance and Sexual Activity    Alcohol use: Yes     Alcohol/week: 4.0 standard drinks of alcohol     Types: 4 Shots of liquor per week     Comment: 2 pints/ weekend    Drug use: No    Sexual activity: Not Currently     Partners: Female   Social History Narrative    Live w/ girlfriend      Social Determinants of Health     Financial Resource Strain: Low Risk  (2024)    Overall Financial Resource Strain (CARDIA)     Difficulty of Paying Living Expenses: Not hard at all   Food Insecurity: No Food Insecurity (2024)    Hunger Vital Sign     Worried About Running Out of Food in the Last Year: Never true     Ran Out of Food in the Last Year: Never true   Transportation Needs: No Transportation Needs (2024)    PRAPARE - Transportation     Lack of Transportation (Medical): No     Lack of Transportation (Non-Medical): No   Physical Activity: Sufficiently Active (2024)    Exercise Vital Sign     Days of Exercise per Week: 5 days     Minutes of Exercise per Session: 90 min   Stress: No Stress Concern  "Present (1/16/2024)    [employer] [place] [employer] - Occupational Stress Questionnaire     Feeling of Stress : Not at all   Housing Stability: High Risk (1/16/2024)    Housing Stability Vital Sign     Unable to Pay for Housing in the Last Year: Yes     Number of Places Lived in the Last Year: 2     Unstable Housing in the Last Year: No       Family History   Problem Relation Name Age of Onset    Schizophrenia Mother      Heart disease Father David     Hypertension Father David     Stroke Father David     Heart failure Father David     No Known Problems Sister      No Known Problems Sister      No Known Problems Sister      No Known Problems Sister      No Known Problems Sister      Cancer Paternal Grandmother Shannan     No Known Problems Daughter      Diabetes Neg Hx      Thyroid cancer Neg Hx          MEN2          ROS:  14 point review of systems was reviewed.  Pertinent positives and negatives per HPI.  All others Negative    Objective:   Physical Exam   Vitals:    05/08/24 0808   BP: 120/80   BP Location: Right arm   Patient Position: Sitting   BP Method: Large (Manual)   Pulse: 98   Resp: 18   SpO2: (!) 94%   Weight: (!) 148.3 kg (326 lb 15.1 oz)   Height: 5' 8" (1.727 m)     Wt Readings from Last 3 Encounters:   05/08/24 (!) 148.3 kg (326 lb 15.1 oz)   04/12/24 (!) 164.2 kg (361 lb 15.9 oz)   02/21/24 (!) 164.2 kg (361 lb 15.9 oz)       Constitutional:  Pleasant, in no acute distress.   HENT:   Head:    Normocephalic and atraumatic.   Mouth/Throat:   Oropharynx is clear and moist. No oropharyngeal exudate.   Eyes:    EOMI, VF in tact to confrontation . No scleral icterus.   Neck:    No thyromegaly or palpable thyroid nodules, no cervical LAD  Cardiovascular:  Normal rate, regular rhythm, 2+ radial pulses blx  Respiratory:   Effort normal and breath sounds normal.   Gastrointestinal: Soft, nontender  Neurological:  No tremor, normal speech  Skin:    Skin is warm, dry  Psych:  Normal mood and " affect.   Extremity:  No edema or wounds, no deformity    LABORATORY REVIEW:    Chemistry        Component Value Date/Time     01/17/2024 0932    K 4.1 01/17/2024 0932     01/17/2024 0932    CO2 31 (H) 01/17/2024 0932    BUN 16 01/17/2024 0932    CREATININE 0.9 01/17/2024 0932    GLU 87 01/17/2024 0932        Component Value Date/Time    CALCIUM 10.1 01/17/2024 0932    ALKPHOS 90 01/17/2024 0932    AST 18 01/17/2024 0932    ALT 23 01/17/2024 0932    BILITOT 0.3 01/17/2024 0932    ESTGFRAFRICA >60.0 03/13/2020 1517    EGFRNONAA >60.0 03/13/2020 1517             Lab Results   Component Value Date    PROLACTIN 2.7 (L) 05/08/2024    PROLACTIN 4.1 06/29/2020    PROLACTIN 880.1 (H) 05/29/2020    TSH 1.532 01/17/2024    TSH 1.022 06/15/2020    TSH 0.725 03/15/2019    FREET4 0.94 06/15/2020    CORTISOL 10.20 06/15/2020    LABLH 1.9 05/29/2020    FSH 4.80 05/29/2020    SOMATMDN 235 06/15/2020     01/17/2024     03/31/2023     03/13/2020     08/23/2019    K 4.1 01/17/2024    K 4.6 03/31/2023    K 4.2 03/13/2020    K 4.3 08/23/2019    CALCIUM 10.1 01/17/2024    CALCIUM 9.8 03/31/2023    CALCIUM 9.8 03/13/2020    CALCIUM 10.0 08/23/2019    ALBUMIN 3.4 (L) 01/17/2024    ALBUMIN 3.8 03/31/2023    ALBUMIN 3.7 05/29/2020    ALBUMIN 3.6 03/13/2020    ESTGFRAFRICA >60.0 03/13/2020    ESTGFRAFRICA >60.0 08/23/2019    ESTGFRAFRICA >60.0 03/15/2019    EGFRNONAA >60.0 03/13/2020    EGFRNONAA >60.0 08/23/2019    EGFRNONAA >60.0 03/15/2019        Imaging:      Assessment/Plan:     Problem List Items Addressed This Visit          1 - High    Prolactin-secreting pituitary adenoma - Primary    Relevant Medications    cabergoline (DOSTINEX) 0.5 mg tablet (Start on 5/9/2024)    Other Relevant Orders    Prolactin    Insulin-Like Growth Factor    MRI Pituitary W W/O Contrast    Prolactin (Completed)        Return to clinic in 12 months  Labs in six months    Donya Griffin MD

## 2024-05-13 LAB
IGF-I SERPL-MCNC: 200 NG/ML (ref 40–259)
IGF-I Z-SCORE SERPL: 1.28 SD

## 2024-12-23 ENCOUNTER — OFFICE VISIT (OUTPATIENT)
Dept: FAMILY MEDICINE | Facility: CLINIC | Age: 49
End: 2024-12-23
Payer: COMMERCIAL

## 2024-12-23 VITALS
SYSTOLIC BLOOD PRESSURE: 126 MMHG | OXYGEN SATURATION: 97 % | WEIGHT: 315 LBS | HEART RATE: 98 BPM | DIASTOLIC BLOOD PRESSURE: 89 MMHG | TEMPERATURE: 97 F | BODY MASS INDEX: 47.74 KG/M2 | HEIGHT: 68 IN

## 2024-12-23 DIAGNOSIS — J01.90 ACUTE BACTERIAL SINUSITIS: Primary | ICD-10-CM

## 2024-12-23 DIAGNOSIS — B96.89 ACUTE BACTERIAL SINUSITIS: Primary | ICD-10-CM

## 2024-12-23 PROCEDURE — 96372 THER/PROPH/DIAG INJ SC/IM: CPT | Mod: S$GLB,,, | Performed by: STUDENT IN AN ORGANIZED HEALTH CARE EDUCATION/TRAINING PROGRAM

## 2024-12-23 PROCEDURE — 99214 OFFICE O/P EST MOD 30 MIN: CPT | Mod: 25,S$GLB,, | Performed by: STUDENT IN AN ORGANIZED HEALTH CARE EDUCATION/TRAINING PROGRAM

## 2024-12-23 PROCEDURE — 3008F BODY MASS INDEX DOCD: CPT | Mod: CPTII,S$GLB,, | Performed by: STUDENT IN AN ORGANIZED HEALTH CARE EDUCATION/TRAINING PROGRAM

## 2024-12-23 PROCEDURE — 3074F SYST BP LT 130 MM HG: CPT | Mod: CPTII,S$GLB,, | Performed by: STUDENT IN AN ORGANIZED HEALTH CARE EDUCATION/TRAINING PROGRAM

## 2024-12-23 PROCEDURE — 1159F MED LIST DOCD IN RCRD: CPT | Mod: CPTII,S$GLB,, | Performed by: STUDENT IN AN ORGANIZED HEALTH CARE EDUCATION/TRAINING PROGRAM

## 2024-12-23 PROCEDURE — 99999 PR PBB SHADOW E&M-EST. PATIENT-LVL III: CPT | Mod: PBBFAC,,, | Performed by: STUDENT IN AN ORGANIZED HEALTH CARE EDUCATION/TRAINING PROGRAM

## 2024-12-23 PROCEDURE — 3044F HG A1C LEVEL LT 7.0%: CPT | Mod: CPTII,S$GLB,, | Performed by: STUDENT IN AN ORGANIZED HEALTH CARE EDUCATION/TRAINING PROGRAM

## 2024-12-23 PROCEDURE — 3079F DIAST BP 80-89 MM HG: CPT | Mod: CPTII,S$GLB,, | Performed by: STUDENT IN AN ORGANIZED HEALTH CARE EDUCATION/TRAINING PROGRAM

## 2024-12-23 RX ORDER — AZITHROMYCIN 250 MG/1
TABLET, FILM COATED ORAL
Qty: 6 TABLET | Refills: 0 | Status: SHIPPED | OUTPATIENT
Start: 2024-12-23 | End: 2024-12-28

## 2024-12-23 RX ORDER — TRIAMCINOLONE ACETONIDE 40 MG/ML
40 INJECTION, SUSPENSION INTRA-ARTICULAR; INTRAMUSCULAR
Status: COMPLETED | OUTPATIENT
Start: 2024-12-23 | End: 2024-12-23

## 2024-12-23 RX ORDER — PROMETHAZINE HYDROCHLORIDE AND DEXTROMETHORPHAN HYDROBROMIDE 6.25; 15 MG/5ML; MG/5ML
5 SYRUP ORAL EVERY 6 HOURS PRN
Qty: 118 ML | Refills: 0 | Status: SHIPPED | OUTPATIENT
Start: 2024-12-23 | End: 2025-01-02

## 2024-12-23 RX ADMIN — TRIAMCINOLONE ACETONIDE 40 MG: 40 INJECTION, SUSPENSION INTRA-ARTICULAR; INTRAMUSCULAR at 10:12

## 2024-12-23 NOTE — PROGRESS NOTES
Patient ID: Dylan Ariza is a 49 y.o. male.    Chief Complaint: Nasal Congestion    History of Present Illness    CHIEF COMPLAINT:  Mr. Ariza presents today with worsening congestion and phlegm.    HISTORY OF PRESENT ILLNESS:  He reports symptoms have been present for 2 weeks and are worsening. He has had exposure to illness from his girlfriend. He works in extreme cold temperatures outdoors. He denies fever, shortness of breath, and leg swelling.    CURRENT MEDICATIONS:  He is taking Agassiz and NyQuil for cough symptoms.    ALLERGIES:  He denies any allergies to antibiotics.      ROS:  General: -fever, -chills, -fatigue, -weight gain, -weight loss  Eyes: -vision changes, -redness, -discharge  ENT: -ear pain, +nasal congestion, -sore throat  Cardiovascular: -chest pain, -palpitations, -lower extremity edema  Respiratory: +cough, -shortness of breath  Gastrointestinal: -abdominal pain, -nausea, -vomiting, -diarrhea, -constipation, -blood in stool  Genitourinary: -dysuria, -hematuria, -frequency  Musculoskeletal: -joint pain, -muscle pain  Skin: -rash, -lesion  Neurological: -headache, -dizziness, -numbness, -tingling  Psychiatric: -anxiety, -depression, -sleep difficulty         Pmh, Psh, Family Hx, Social Hx updated in Epic Tabs today.   No problems updated.      Review of Systems    General - Well developed, alert and oriented in NAD  HEENT - normocephalic, no evidence of trauma, sclera white, EOMI  Neck - full range of motion  COR - regular rate and rhythm without murmurs or gallops  Lungs - Clear  Abdomen - soft, non-tender  Ext - no cyanosis or edema    Physical Exam     Assessment:     1. Acute bacterial sinusitis        LABS:   Lab Results   Component Value Date    HGBA1C 5.8 (H) 01/17/2024    HGBA1C 5.7 (H) 03/31/2023    HGBA1C 5.9 (H) 03/13/2020      Lab Results   Component Value Date    CHOL 257 (H) 03/31/2023    CHOL 224 (H) 08/23/2019    CHOL 224 (H) 03/15/2019     Lab Results   Component  Value Date    LDLCALC 191.0 (H) 03/31/2023    LDLCALC 157.6 08/23/2019    LDLCALC 144.4 03/15/2019     Lab Results   Component Value Date    WBC 6.69 01/17/2024    HGB 12.7 (L) 01/17/2024    HCT 40.4 01/17/2024     01/17/2024    CHOL 257 (H) 03/31/2023    TRIG 130 03/31/2023    HDL 40 03/31/2023    ALT 23 01/17/2024    AST 18 01/17/2024     01/17/2024    K 4.1 01/17/2024     01/17/2024    CREATININE 0.9 01/17/2024    BUN 16 01/17/2024    CO2 31 (H) 01/17/2024    TSH 1.532 01/17/2024    PSA 0.78 01/17/2024    HGBA1C 5.8 (H) 01/17/2024       Plan:   Dylan was seen today for nasal congestion.    Diagnoses and all orders for this visit:    Acute bacterial sinusitis  -     triamcinolone acetonide injection 40 mg  -     azithromycin (Z-SANG) 250 MG tablet; Take 2 tablets by mouth on day 1; Take 1 tablet by mouth on days 2-5  -     promethazine-dextromethorphan (PROMETHAZINE-DM) 6.25-15 mg/5 mL Syrp; Take 5 mLs by mouth every 6 (six) hours as needed.        Assessment & Plan    IMPRESSION:  - Assessed patient with 2-week history of worsening cough with sputum and congestion, no fever  - Ruled out pneumonia based on clear chest sounds  - Diagnosed acute bacterial sinusitis due to duration and worsening symptoms  - Will prescribe antibiotic (Z-Sang) and cough syrup  - Considered steroid injection as patient does not have diabetes    ACUTE SINUSITIS:  - Started Z-Sang (azithromycin) for acute bacterial sinusitis.  - Started cough syrup to help dissolve mucus and improve sleep.    FOLLOW-UP:  - Follow up as needed if symptoms do not improve.  - Follow up if deciding to become a primary care patient at this practice.           Face to Face time with patient: 10:20 AM CST -      Each patient to whom he or she provides medical services by telemedicine is:  (1) informed of the relationship between the physician and patient and the respective role of any other health care provider with respect to management of the  patient; and (2) notified that he or she may decline to receive medical services by telemedicine and may withdraw from such care at any time.    I spent a total of   32    minutes face to face and non-face to face on the date of this visit.This includes time preparing to see the patient (eg, review of tests, notes), obtaining and/or reviewing additional history from an independent historian and/or outside medical records, documenting clinical information in the electronic health record, independently interpreting results and/or communicating results to the patient/family/caregiver, or care coordinator.  Visit today included increased complexity associated with the care of the episodic problem addressed and managing the longitudinal care of the patient due to the serious and/or complex managed problem(s).    There are no Patient Instructions on file for this visit.    No follow-ups on file.  The 10-year ASCVD risk score (Beth BARKSDALE, et al., 2019) is: 9.6%    Values used to calculate the score:      Age: 49 years      Sex: Male      Is Non- : Yes      Diabetic: No      Tobacco smoker: Yes      Systolic Blood Pressure: 126 mmHg      Is BP treated: No      HDL Cholesterol: 40 mg/dL      Total Cholesterol: 257 mg/dL    This note was generated with the assistance of ambient listening technology. Verbal consent was obtained by the patient and accompanying visitor(s) for the recording of patient appointment to facilitate this note. I attest to having reviewed and edited the generated note for accuracy, though some syntax or spelling errors may persist. Please contact the author of this note for any clarification.

## 2024-12-26 ENCOUNTER — OFFICE VISIT (OUTPATIENT)
Dept: FAMILY MEDICINE | Facility: CLINIC | Age: 49
End: 2024-12-26
Payer: COMMERCIAL

## 2024-12-26 ENCOUNTER — HOSPITAL ENCOUNTER (OUTPATIENT)
Dept: CARDIOLOGY | Facility: HOSPITAL | Age: 49
Discharge: HOME | End: 2024-12-26
Attending: STUDENT IN AN ORGANIZED HEALTH CARE EDUCATION/TRAINING PROGRAM
Payer: COMMERCIAL

## 2024-12-26 ENCOUNTER — HOSPITAL ENCOUNTER (OUTPATIENT)
Dept: RADIOLOGY | Facility: HOSPITAL | Age: 49
Discharge: HOME | End: 2024-12-26
Attending: STUDENT IN AN ORGANIZED HEALTH CARE EDUCATION/TRAINING PROGRAM
Payer: COMMERCIAL

## 2024-12-26 VITALS
DIASTOLIC BLOOD PRESSURE: 89 MMHG | SYSTOLIC BLOOD PRESSURE: 126 MMHG | HEIGHT: 68 IN | WEIGHT: 315 LBS | BODY MASS INDEX: 47.74 KG/M2

## 2024-12-26 DIAGNOSIS — R60.0 LEG EDEMA: ICD-10-CM

## 2024-12-26 DIAGNOSIS — R06.02 SHORTNESS OF BREATH: ICD-10-CM

## 2024-12-26 DIAGNOSIS — J18.9 PNEUMONIA DUE TO INFECTIOUS ORGANISM, UNSPECIFIED LATERALITY, UNSPECIFIED PART OF LUNG: ICD-10-CM

## 2024-12-26 DIAGNOSIS — J18.9 PNEUMONIA DUE TO INFECTIOUS ORGANISM, UNSPECIFIED LATERALITY, UNSPECIFIED PART OF LUNG: Primary | ICD-10-CM

## 2024-12-26 LAB
AORTIC ROOT ANNULUS: 3.01 CM
ASCENDING AORTA: 2.91 CM
AV INDEX (PROSTH): 0.77
AV MEAN GRADIENT: 4.7 MMHG
AV PEAK GRADIENT: 7.8 MMHG
AV VALVE AREA BY VELOCITY RATIO: 2.7 CM²
AV VALVE AREA: 2.7 CM²
AV VELOCITY RATIO: 0.79
BSA FOR ECHO PROCEDURE: 2.69 M2
CV ECHO LV RWT: 0.43 CM
DOP CALC AO PEAK VEL: 1.4 M/S
DOP CALC AO VTI: 29.3 CM
DOP CALC LVOT AREA: 3.5 CM2
DOP CALC LVOT DIAMETER: 2.1 CM
DOP CALC LVOT PEAK VEL: 1.1 M/S
DOP CALC LVOT STROKE VOLUME: 78.6 CM3
DOP CALC RVOT PEAK VEL: 0.85 M/S
DOP CALC RVOT VTI: 15.1 CM
DOP CALCLVOT PEAK VEL VTI: 22.7 CM
E WAVE DECELERATION TIME: 275.53 MSEC
E/A RATIO: 0.82
E/E' RATIO: 6.82 M/S
ECHO LV POSTERIOR WALL: 1 CM (ref 0.6–1.1)
FRACTIONAL SHORTENING: 36.2 % (ref 28–44)
INTERVENTRICULAR SEPTUM: 1.2 CM (ref 0.6–1.1)
IVC DIAMETER: 1.77 CM
IVRT: 88.49 MSEC
LA MAJOR: 5.19 CM
LA MINOR: 5.39 CM
LA WIDTH: 3.7 CM
LEFT ATRIUM AREA SYSTOLIC (APICAL 2 CHAMBER): 18.28 CM2
LEFT ATRIUM AREA SYSTOLIC (APICAL 4 CHAMBER): 19.63 CM2
LEFT ATRIUM SIZE: 3.78 CM
LEFT ATRIUM VOLUME INDEX MOD: 23.1 ML/M2
LEFT ATRIUM VOLUME INDEX: 24.8 ML/M2
LEFT ATRIUM VOLUME MOD: 58.72 ML
LEFT ATRIUM VOLUME: 62.87 CM3
LEFT INTERNAL DIMENSION IN SYSTOLE: 3 CM (ref 2.1–4)
LEFT VENTRICLE DIASTOLIC VOLUME INDEX: 40.13 ML/M2
LEFT VENTRICLE DIASTOLIC VOLUME: 101.92 ML
LEFT VENTRICLE END SYSTOLIC VOLUME APICAL 2 CHAMBER: 55.52 ML
LEFT VENTRICLE END SYSTOLIC VOLUME APICAL 4 CHAMBER: 61.22 ML
LEFT VENTRICLE MASS INDEX: 73.8 G/M2
LEFT VENTRICLE SYSTOLIC VOLUME INDEX: 13.8 ML/M2
LEFT VENTRICLE SYSTOLIC VOLUME: 34.94 ML
LEFT VENTRICULAR INTERNAL DIMENSION IN DIASTOLE: 4.7 CM (ref 3.5–6)
LEFT VENTRICULAR MASS: 187.5 G
LV LATERAL E/E' RATIO: 5.77 M/S
LV SEPTAL E/E' RATIO: 8.33 M/S
LVED V (TEICH): 101.92 ML
LVES V (TEICH): 34.94 ML
LVOT MG: 3.1 MMHG
LVOT MV: 0.83 CM/S
MV PEAK A VEL: 0.91 M/S
MV PEAK E VEL: 0.75 M/S
MV STENOSIS PRESSURE HALF TIME: 79.9 MS
MV VALVE AREA P 1/2 METHOD: 2.75 CM2
OHS CV RV/LV RATIO: 0.81 CM
PISA TR MAX VEL: 2.6 M/S
PV MEAN GRADIENT: 1 MMHG
PV PEAK GRADIENT: 6 MMHG
PV PEAK VELOCITY: 1.23 M/S
RA MAJOR: 4.27 CM
RA PRESSURE ESTIMATED: 3 MMHG
RA WIDTH: 3.79 CM
RIGHT VENTRICLE DIASTOLIC BASEL DIMENSION: 3.8 CM
RIGHT VENTRICULAR END-DIASTOLIC DIMENSION: 3.79 CM
RV TB RVSP: 6 MMHG
SINUS: 2.79 CM
STJ: 2.59 CM
TDI LATERAL: 0.13 M/S
TDI SEPTAL: 0.09 M/S
TDI: 0.11 M/S
TR MAX PG: 27 MMHG
TRICUSPID ANNULAR PLANE SYSTOLIC EXCURSION: 2.5 CM
TV REST PULMONARY ARTERY PRESSURE: 30 MMHG
Z-SCORE OF LEFT VENTRICULAR DIMENSION IN END DIASTOLE: -11.94
Z-SCORE OF LEFT VENTRICULAR DIMENSION IN END SYSTOLE: -8.83

## 2024-12-26 PROCEDURE — 71046 X-RAY EXAM CHEST 2 VIEWS: CPT | Mod: 26,,, | Performed by: RADIOLOGY

## 2024-12-26 PROCEDURE — 99214 OFFICE O/P EST MOD 30 MIN: CPT | Mod: S$GLB,,, | Performed by: STUDENT IN AN ORGANIZED HEALTH CARE EDUCATION/TRAINING PROGRAM

## 2024-12-26 PROCEDURE — 93306 TTE W/DOPPLER COMPLETE: CPT | Mod: 26,,, | Performed by: INTERNAL MEDICINE

## 2024-12-26 PROCEDURE — 71046 X-RAY EXAM CHEST 2 VIEWS: CPT | Mod: TC

## 2024-12-26 PROCEDURE — 93306 TTE W/DOPPLER COMPLETE: CPT

## 2024-12-26 PROCEDURE — 3044F HG A1C LEVEL LT 7.0%: CPT | Mod: CPTII,S$GLB,, | Performed by: STUDENT IN AN ORGANIZED HEALTH CARE EDUCATION/TRAINING PROGRAM

## 2024-12-26 PROCEDURE — 99999 PR PBB SHADOW E&M-EST. PATIENT-LVL I: CPT | Mod: PBBFAC,,, | Performed by: STUDENT IN AN ORGANIZED HEALTH CARE EDUCATION/TRAINING PROGRAM

## 2024-12-26 RX ORDER — AMOXICILLIN AND CLAVULANATE POTASSIUM 875; 125 MG/1; MG/1
1 TABLET, FILM COATED ORAL EVERY 12 HOURS
Qty: 10 TABLET | Refills: 0 | Status: SHIPPED | OUTPATIENT
Start: 2024-12-26 | End: 2024-12-31

## 2024-12-26 NOTE — PROGRESS NOTES
Patient ID: Dylan Ariza is a 49 y.o. male.    Chief Complaint: No chief complaint on file.    History of Present Illness    CHIEF COMPLAINT:  Mr. Ariza presents today with symptoms of an upper respiratory infection.    UPPER RESPIRATORY INFECTION:  He reports symptoms began one week ago, initially with sinus-like symptoms. He has a productive cough with pink and brown-colored sputum that worsens at night. He experiences shortness of breath when lying down. He also notes bilateral leg swelling.      ROS:  General: -fever, -chills, -fatigue, -weight gain, -weight loss  Eyes: -vision changes, -redness, -discharge  ENT: -ear pain, -nasal congestion, -sore throat  Cardiovascular: -chest pain, -palpitations, -lower extremity edema  Respiratory: +cough, +shortness of breath  Gastrointestinal: -abdominal pain, -nausea, -vomiting, -diarrhea, -constipation, -blood in stool  Genitourinary: -dysuria, -hematuria, -frequency  Musculoskeletal: -joint pain, -muscle pain  Skin: -rash, -lesion  Neurological: -headache, -dizziness, -numbness, -tingling  Psychiatric: -anxiety, -depression, -sleep difficulty         Pmh, Psh, Family Hx, Social Hx updated in Epic Tabs today.   No problems updated.      Review of Systems    General - Well developed, alert and oriented in NAD, obese   HEENT - normocephalic, no evidence of trauma, sclera white, EOMI  Neck - full range of motion  COR - regular rate and rhythm without murmurs or gallops  Lungs - faint crackles heard  Abdomen - soft, non-tender  Ext - no cyanosis, 1+ pedal edema   Physical Exam     Assessment:     1. Pneumonia due to infectious organism, unspecified laterality, unspecified part of lung    2. Shortness of breath    3. Leg edema        LABS:   Lab Results   Component Value Date    HGBA1C 5.8 (H) 01/17/2024    HGBA1C 5.7 (H) 03/31/2023    HGBA1C 5.9 (H) 03/13/2020      Lab Results   Component Value Date    CHOL 257 (H) 03/31/2023    CHOL 224 (H) 08/23/2019    CHOL  224 (H) 03/15/2019     Lab Results   Component Value Date    LDLCALC 191.0 (H) 03/31/2023    LDLCALC 157.6 08/23/2019    LDLCALC 144.4 03/15/2019     Lab Results   Component Value Date    WBC 6.69 01/17/2024    HGB 12.7 (L) 01/17/2024    HCT 40.4 01/17/2024     01/17/2024    CHOL 257 (H) 03/31/2023    TRIG 130 03/31/2023    HDL 40 03/31/2023    ALT 23 01/17/2024    AST 18 01/17/2024     01/17/2024    K 4.1 01/17/2024     01/17/2024    CREATININE 0.9 01/17/2024    BUN 16 01/17/2024    CO2 31 (H) 01/17/2024    TSH 1.532 01/17/2024    PSA 0.78 01/17/2024    HGBA1C 5.8 (H) 01/17/2024       Plan:   Diagnoses and all orders for this visit:    Pneumonia due to infectious organism, unspecified laterality, unspecified part of lung  -     amoxicillin-clavulanate 875-125mg (AUGMENTIN) 875-125 mg per tablet; Take 1 tablet by mouth every 12 (twelve) hours. for 5 days  -     X-Ray Chest PA And Lateral; Future    Shortness of breath  -     X-Ray Chest PA And Lateral; Future  -     Echo; Future    Leg edema  -     Echo; Future        Assessment & Plan    IMPRESSION:  - Suspected pneumonia based on symptoms of cough with brown sputum and shortness of breath  - Considering potential cardiac issues due to leg swelling and shortness of breath when lying down  - Ruled out strep throat and COVID/flu based on negative test results  - Initiated empiric antibiotic therapy with Augmentin for presumed respiratory infection    RESPIRATORY INFECTION:  - Started Augmentin for presumed respiratory infection.  - Ordered chest XR to further evaluate respiratory concerns.  - Completed strep test, with negative result.    DYSPNEA:  - Educated patient on signs of worsening shortness of breath that would warrant emergency evaluation.  - Instructed patient to follow up in emergency room if shortness of breath worsens.    CARDIAC CONCERNS:  - Ordered cardiac ultrasound to further evaluate cardiac concerns.    FOLLOW-UP:  - Instructed  patient to contact office in 2-3 days to report on symptoms and response to treatment.           Face to Face time with patient:  7:00 AM CST -      Each patient to whom he or she provides medical services by telemedicine is:  (1) informed of the relationship between the physician and patient and the respective role of any other health care provider with respect to management of the patient; and (2) notified that he or she may decline to receive medical services by telemedicine and may withdraw from such care at any time.    I spent a total of   32    minutes face to face and non-face to face on the date of this visit.This includes time preparing to see the patient (eg, review of tests, notes), obtaining and/or reviewing additional history from an independent historian and/or outside medical records, documenting clinical information in the electronic health record, independently interpreting results and/or communicating results to the patient/family/caregiver, or care coordinator.  Visit today included increased complexity associated with the care of the episodic problem addressed and managing the longitudinal care of the patient due to the serious and/or complex managed problem(s).    There are no Patient Instructions on file for this visit.    No follow-ups on file.  The 10-year ASCVD risk score (Beth DK, et al., 2019) is: 9.6%    Values used to calculate the score:      Age: 49 years      Sex: Male      Is Non- : Yes      Diabetic: No      Tobacco smoker: Yes      Systolic Blood Pressure: 126 mmHg      Is BP treated: No      HDL Cholesterol: 40 mg/dL      Total Cholesterol: 257 mg/dL    This note was generated with the assistance of ambient listening technology. Verbal consent was obtained by the patient and accompanying visitor(s) for the recording of patient appointment to facilitate this note. I attest to having reviewed and edited the generated note for accuracy, though some syntax  or spelling errors may persist. Please contact the author of this note for any clarification.

## 2025-01-26 ENCOUNTER — PATIENT MESSAGE (OUTPATIENT)
Dept: DERMATOLOGY | Facility: CLINIC | Age: 50
End: 2025-01-26
Payer: COMMERCIAL

## 2025-01-26 DIAGNOSIS — H02.829 CYST OF EYELID, UNSPECIFIED LATERALITY: Primary | ICD-10-CM

## 2025-02-04 ENCOUNTER — OFFICE VISIT (OUTPATIENT)
Dept: OPHTHALMOLOGY | Facility: CLINIC | Age: 50
End: 2025-02-04
Payer: COMMERCIAL

## 2025-02-04 DIAGNOSIS — D23.111: Primary | ICD-10-CM

## 2025-02-04 PROCEDURE — 1159F MED LIST DOCD IN RCRD: CPT | Mod: CPTII,S$GLB,, | Performed by: STUDENT IN AN ORGANIZED HEALTH CARE EDUCATION/TRAINING PROGRAM

## 2025-02-04 PROCEDURE — 1160F RVW MEDS BY RX/DR IN RCRD: CPT | Mod: CPTII,S$GLB,, | Performed by: STUDENT IN AN ORGANIZED HEALTH CARE EDUCATION/TRAINING PROGRAM

## 2025-02-04 PROCEDURE — 99204 OFFICE O/P NEW MOD 45 MIN: CPT | Mod: S$GLB,,, | Performed by: STUDENT IN AN ORGANIZED HEALTH CARE EDUCATION/TRAINING PROGRAM

## 2025-02-04 PROCEDURE — 99999 PR PBB SHADOW E&M-EST. PATIENT-LVL III: CPT | Mod: PBBFAC,,, | Performed by: STUDENT IN AN ORGANIZED HEALTH CARE EDUCATION/TRAINING PROGRAM

## 2025-02-04 NOTE — LETTER
February 4, 2025      [facility]  [address]  [place] [address]  Phone: [phone]  Fax: [phone]       Patient: Dylan Ariza   YOB: 1975  Date of Visit: 02/04/2025    To Whom It May Concern:    Mi Ariza  was at [facility] on 02/04/2025. The patient may return to work/school on 2/05/2025 with no restrictions. If you have any questions or concerns, or if I can be of further assistance, please do not hesitate to contact me.    Sincerely,  Concetta Page MD

## 2025-02-04 NOTE — PROGRESS NOTES
HPI     Eye Problem     Additional comments: Pt reports for bump on the outer corner of OD. Va   stable. No pain or irritation.           Last edited by Donna Moscoso on 2/4/2025  8:10 AM.            Assessment /Plan     For exam results, see Encounter Report.    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 Procedure day       RTC next aval. For removal

## 2025-02-07 ENCOUNTER — PATIENT MESSAGE (OUTPATIENT)
Dept: ENDOCRINOLOGY | Facility: CLINIC | Age: 50
End: 2025-02-07
Payer: COMMERCIAL

## 2025-02-10 ENCOUNTER — TELEPHONE (OUTPATIENT)
Dept: ENDOCRINOLOGY | Facility: CLINIC | Age: 50
End: 2025-02-10
Payer: COMMERCIAL

## 2025-02-10 NOTE — TELEPHONE ENCOUNTER
Diagnosis: prolactinoma  Plan: medical treatment  Restrictions: none   Prognosis: good    Cabergoline 0.5 mg one tablet by mouth twice weekly  Dispense 24   Refills 3

## 2025-02-14 ENCOUNTER — OFFICE VISIT (OUTPATIENT)
Dept: OPHTHALMOLOGY | Facility: CLINIC | Age: 50
End: 2025-02-14
Payer: COMMERCIAL

## 2025-02-14 DIAGNOSIS — D23.112: Primary | ICD-10-CM

## 2025-02-14 PROCEDURE — 99999 PR PBB SHADOW E&M-EST. PATIENT-LVL II: CPT | Mod: PBBFAC,,, | Performed by: STUDENT IN AN ORGANIZED HEALTH CARE EDUCATION/TRAINING PROGRAM

## 2025-02-14 RX ORDER — NEOMYCIN SULFATE, POLYMYXIN B SULFATE, AND DEXAMETHASONE 3.5; 10000; 1 MG/G; [USP'U]/G; MG/G
OINTMENT OPHTHALMIC 3 TIMES DAILY
Qty: 1 EACH | Refills: 1 | Status: SHIPPED | OUTPATIENT
Start: 2025-02-14 | End: 2025-02-28

## 2025-02-14 NOTE — PROGRESS NOTES
Assessment /Plan     For exam results, see Encounter Report.    Apocrine hidrocystoma of right lower eyelid: plan for excision today  -     Preop Diagnosis: Eyelid lesion of the right lower lid     Post Op diagnosis : the same    Procedure : Excision of lesion(s)    Complications: none    Surgeon : Concetta Page    Anesthesia : Local infiltration    After obtaining informed consent, the patient was brought to the minor OR suite. Lesion(s) were outlined using a surgical marker. Local infiltration with 2% Xylocaine with epi was delivered.     A sterile prep and drape was performed.  The lesion was excised using 0.12 forceps and Diana scissors. Bleeding was controlled with cautery. The surgical wound was closed with 3 interrupted 5-0 plain gut sutures.    The patient tolerated the procedure well and a pressure patch was applied as needed with steroid antibiotic ophthalmic ointment. The patient was discharged in satisfactory condition and will return as needed.      RTC PRN

## 2025-03-06 ENCOUNTER — OFFICE VISIT (OUTPATIENT)
Dept: FAMILY MEDICINE | Facility: CLINIC | Age: 50
End: 2025-03-06
Payer: COMMERCIAL

## 2025-03-06 DIAGNOSIS — B86 SCABIES: Primary | ICD-10-CM

## 2025-03-06 DIAGNOSIS — J06.9 UPPER RESPIRATORY TRACT INFECTION, UNSPECIFIED TYPE: ICD-10-CM

## 2025-03-06 PROCEDURE — 98005 SYNCH AUDIO-VIDEO EST LOW 20: CPT | Mod: 95,,, | Performed by: NURSE PRACTITIONER

## 2025-03-06 RX ORDER — PERMETHRIN 50 MG/G
CREAM TOPICAL ONCE
Qty: 60 G | Refills: 0 | Status: SHIPPED | OUTPATIENT
Start: 2025-03-06 | End: 2025-03-06

## 2025-03-06 RX ORDER — LEVOCETIRIZINE DIHYDROCHLORIDE 5 MG/1
5 TABLET, FILM COATED ORAL NIGHTLY
Qty: 30 TABLET | Refills: 0 | Status: SHIPPED | OUTPATIENT
Start: 2025-03-06 | End: 2026-03-06

## 2025-03-06 RX ORDER — FLUTICASONE PROPIONATE 50 MCG
2 SPRAY, SUSPENSION (ML) NASAL DAILY
Qty: 16 G | Refills: 0 | Status: SHIPPED | OUTPATIENT
Start: 2025-03-06

## 2025-03-10 NOTE — PROGRESS NOTES
Subjective:       Patient ID: Dylan Ariza is a 49 y.o. male.    Chief Complaint: No chief complaint on file.    The patient location is: [place]   The chief complaint leading to consultation is: rash,     Visit type: audiovisual    Face to Face time with patient: 10  15 minutes of total time spent on the encounter, which includes face to face time and non-face to face time preparing to see the patient (eg, review of tests), Obtaining and/or reviewing separately obtained history, Documenting clinical information in the electronic or other health record, Independently interpreting results (not separately reported) and communicating results to the patient/family/caregiver, or Care coordination (not separately reported).         Each patient to whom he or she provides medical services by telemedicine is:  (1) informed of the relationship between the physician and patient and the respective role of any other health care provider with respect to management of the patient; and (2) notified that he or she may decline to receive medical services by telemedicine and may withdraw from such care at any time.    Notes:    History of Present Illness    Patient presents today with a rash and itching. He reports a rash that started 3-4 days ago, located on the inside and outside of both wrists, sides of legs, inside of legs near knees, and on the elbow. Scratching has led to bleeding. He denies rash between fingers. He has attempted treatment with antibiotic cream and Benadryl cream without improvement. He reports a scratchy throat. He denies a history of allergies and any new exposures to potential irritants such as laundry detergent, medicines, lotion, or body wash. He works in a [employer].      ROS:  General: -fever, -chills, -fatigue, -weight gain, -weight loss  Eyes: -vision changes, -redness, -discharge  ENT: -ear pain, -nasal congestion, +sore throat  Cardiovascular: -chest pain, -palpitations, -lower extremity  edema  Respiratory: -cough, -shortness of breath  Gastrointestinal: -abdominal pain, -nausea, -vomiting, -diarrhea, -constipation, -blood in stool  Genitourinary: -dysuria, -hematuria, -frequency  Musculoskeletal: -joint pain, -muscle pain  Skin: +rash, -lesion, +itching  Neurological: -headache, -dizziness, -numbness, -tingling  Psychiatric: -anxiety, -depression, -sleep difficulty  Allergic: -seasonal allergies        Past Medical History:   Diagnosis Date    Chronic back pain     Chronic cough     GERD (gastroesophageal reflux disease)     Globus sensation     Insulin resistance     Mixed hyperlipidemia 12/13/2018    Prediabetes     Prolactin-secreting pituitary adenoma     Testosterone deficiency     Vitamin D deficiency       Medications Ordered Prior to Encounter[1]       Objective:      Physical Exam    General: In no acute distress.  Head: Normocephalic.   Eyes: PERRLA. EOMs full. Conjunctivae clear.   Neck: Supple.  Chest: resp even and non labored. Speaks without panting   .  Neuro: No focal deficits appreciated. Good muscle tone. Normal response to visual stimuli. Normal response to auditory stimuli.  Skin: Normal. No rashes. No lesions noted. SMALL erythematous papules AND SCABS ON ARMS.          Assessment/Plan:     1. Scabies    2. Upper respiratory tract infection, unspecified type       Assessment & Plan    SCABIES:  - Assessed the patient's rash presentation, considering its location and appearance.  - Determined likely diagnosis of scabies based on visual exam via video consultation.  -   SCRATCHY THROAT:  - Evaluated the patient's reported scratchy throat.  - Determined antibiotics are unnecessary for the patient's condition.  - Explained that antibiotics are not indicated for viral throat infections.  - Prescribed nasal spray for the scratchy throat.  - Prescribed additional medication to alleviate throat symptoms and help dry up the throat.               No follow-ups on file.    This note was  generated with the assistance of ambient listening technology. Verbal consent was obtained by the patient and accompanying visitor(s) for the recording of patient appointment to facilitate this note. I attest to having reviewed and edited the generated note for accuracy, though some syntax or spelling errors may persist. Please contact the author of this note for any clarification.       Answers submitted by the patient for this visit:  Rash Questionnaire (Submitted on 3/6/2025)  Chief Complaint: Rash  Chronicity: new  Onset: in the past 7 days  Progression since onset: gradually worsening  Affected locations: left arm, left elbow, left hand, left wrist, left upper leg, right arm, right elbow, right hand, right wrist, right upper leg  Characteristics: pain, redness, itchiness  Exposed to: unknown  anorexia: No  congestion: No  cough: Yes  diarrhea: No  eye pain: No  facial edema: No  fatigue: No  fever: No  joint pain: No  nail changes: No  rhinorrhea: No  shortness of breath: No  sore throat: No  vomiting: No  Treatments tried: anti-itch cream  Improvement on treatment: no relief  asthma: No  allergies: No  eczema: No  varicella: No         [1]   Current Outpatient Medications on File Prior to Visit   Medication Sig Dispense Refill    cabergoline (DOSTINEX) 0.5 mg tablet Take 1 tablet (0.5 mg total) by mouth twice a week. 24 tablet 3     No current facility-administered medications on file prior to visit.

## 2025-03-19 NOTE — TELEPHONE ENCOUNTER
----- Message from Sowmya Li sent at 9/5/2019 11:08 AM CDT -----  Contact: self/[phone]  Type:  Patient Returning Call    Who Called:Dylan Ariza    Who Left Message for Patient:nurse  Does the patient know what this is regarding?:appt location [facility]  Would the patient rather a call back or a response via [facility]? Call back   Best Call Back Number:[phone]  Additional Information:      1

## 2025-05-21 DIAGNOSIS — R73.03 PREDIABETES: ICD-10-CM

## 2025-07-22 DIAGNOSIS — D35.2 PROLACTIN-SECRETING PITUITARY ADENOMA: ICD-10-CM

## 2025-07-23 RX ORDER — CABERGOLINE 0.5 MG/1
0.5 TABLET ORAL
Qty: 8 TABLET | Refills: 0 | Status: SHIPPED | OUTPATIENT
Start: 2025-07-24

## 2025-07-23 NOTE — TELEPHONE ENCOUNTER
Medication Refill Request: Cabergoline    Last endocrinology visit- Dr. Griffin 5/2024  Prolactin-secreting pituitary adenoma on cabergoline 0.5 mg twice weekly  Follow-up appt scheduled- None